# Patient Record
Sex: MALE | Race: WHITE | Employment: FULL TIME | URBAN - NONMETROPOLITAN AREA
[De-identification: names, ages, dates, MRNs, and addresses within clinical notes are randomized per-mention and may not be internally consistent; named-entity substitution may affect disease eponyms.]

---

## 2018-02-05 ENCOUNTER — TELEPHONE (OUTPATIENT)
Dept: FAMILY MEDICINE CLINIC | Age: 68
End: 2018-02-05

## 2018-02-05 DIAGNOSIS — Z00.00 ROUTINE GENERAL MEDICAL EXAMINATION AT A HEALTH CARE FACILITY: Primary | ICD-10-CM

## 2018-03-12 ENCOUNTER — OFFICE VISIT (OUTPATIENT)
Dept: FAMILY MEDICINE CLINIC | Age: 68
End: 2018-03-12
Payer: COMMERCIAL

## 2018-03-12 VITALS
RESPIRATION RATE: 18 BRPM | SYSTOLIC BLOOD PRESSURE: 142 MMHG | OXYGEN SATURATION: 98 % | WEIGHT: 200 LBS | TEMPERATURE: 98.7 F | DIASTOLIC BLOOD PRESSURE: 84 MMHG | HEART RATE: 78 BPM

## 2018-03-12 DIAGNOSIS — Z23 NEED FOR PROPHYLACTIC VACCINATION AGAINST STREPTOCOCCUS PNEUMONIAE (PNEUMOCOCCUS): ICD-10-CM

## 2018-03-12 DIAGNOSIS — Z00.00 ANNUAL PHYSICAL EXAM: Primary | ICD-10-CM

## 2018-03-12 DIAGNOSIS — R53.83 OTHER FATIGUE: ICD-10-CM

## 2018-03-12 DIAGNOSIS — Z28.21 PNEUMOCOCCAL VACCINATION DECLINED: ICD-10-CM

## 2018-03-12 DIAGNOSIS — E78.01 FAMILIAL HYPERCHOLESTEROLEMIA: ICD-10-CM

## 2018-03-12 PROCEDURE — 99397 PER PM REEVAL EST PAT 65+ YR: CPT | Performed by: FAMILY MEDICINE

## 2018-03-12 RX ORDER — CETIRIZINE HYDROCHLORIDE 10 MG/1
10 TABLET ORAL
COMMUNITY

## 2018-03-12 RX ORDER — TAMSULOSIN HYDROCHLORIDE 0.4 MG/1
1 CAPSULE ORAL DAILY
COMMUNITY
Start: 2016-05-02 | End: 2022-03-29 | Stop reason: SDUPTHER

## 2018-03-12 NOTE — PROGRESS NOTES
[facility]  [address] [place] [address]  Dept: [phone]  Dept Fax: [phone]  Loc: [phone]    Eladio Wells is a 74 y.o. male who presents today for his medical conditions/complaints as noted below. Eladio Wells is here for Annual Exam        HPI:   CC: Here today to discuss the following:    Here for yearly physical.    Since his last visit he had a nasopharyngeal mass removed in December was metastatic renal cell carcinoma. He continues to be followed at [facility]    Today he has no major concerns or complaints voiced. HPI    Past Medical History:   Diagnosis Date    Metastatic renal cell carcinoma ([facility] [address]     Nasopharyngeal mass     Metastatic renal cell carcinoma, clear cell      Past Surgical History:   Procedure Laterality Date    ADRENALECTOMY Left 10/2017    NOSE SURGERY  12/2017    Renal cell carcinoma, metastatic clear cell, April 2016, December 2017    PARTIAL NEPHRECTOMY Left 10/2017    t3a n0 m0             Family History   Problem Relation Age of Onset    Breast Cancer Mother     Breast Cancer Sister     Cancer Brother            Social History   Substance Use Topics    Smoking status: Never Smoker    Smokeless tobacco: Never Used    Alcohol use No      Current Outpatient Prescriptions   Medication Sig Dispense Refill    tamsulosin (FLOMAX) 0.4 MG capsule Take 1 capsule by mouth      cetirizine (ZYRTEC) 10 MG tablet Take 10 mg by mouth       No current facility-administered medications for this visit.       Allergies not on file    Health Maintenance   Topic Date Due    Hepatitis C screen  1950    HIV screen  05/25/1967    Shingles Vaccine (1 of 2 - 2 Dose Series) 05/25/2002    Colon cancer screen colonoscopy  05/25/2002    DTaP/Tdap/Td vaccine (1 - Tdap) 01/02/2015    Pneumococcal low/med risk (1 of 2 - PCV13) 05/25/2017    Flu vaccine (1) 09/01/2018 (Originally 9/1/2017)   Jenny Ojeda nodule near the left adrenal gland over prior exams starting in 7/2016, measuring up to 1.8 cm. This is concerning for metastatic disease. 2. Postsurgical changes to the left kidney, decreased compared to 3/7/2017. [facility] M.D. [facility]    Medical Record Number:   [id number]    Patient Name:   Lorena Morales    Date of Operation:   12/11/2017    Primary Surgeon for Case:   Preston Yanes MD    All Surgeons in Case:   * Preston Yanes MD - Primary  * Thomas Diaz MD -     Pre-op Diagnosis:  * Mass of nasopharynx [R22.1]    Post-op Diagnosis:  * Mass of nasopharynx [R22.1]    Anesthesia:   General    Title of Operation:  NASAL/SINUS ENDOSCOPY, SURGICAL; WITH BIOPSY, POLYPECTOMY OR DEBRIDEMENT    Indications For Procedure:   Mr. Dagoberto Coleman has metastatic clear cell carcinoma with a prior metastatic lesion in the nasopharynx that was excisionally biopsy via endoscopic approach approximately 18 months ago. The mass has not returned and is causing symptoms of nasal obstruction The risk of temporary or anosmia, CSF leak, the need for adjuvant therapy, blindness and orbital injury, carotid artery injury, eustachian tube dysfunction, bleeding, and need for additional surgery were discussed. The patient understands and agrees to proceed. Findings At Operation:   Pedunculated lesion attached to roof of left nasopharynx, posterior and superior to eustachian tube opening. Removed with stalk and surrounding portion of mucosa. Note: if additional resection needed in future, additional access may be needed - either septoplasty for spurs, inferior turbinate reduction, or posterior septectomy. Assessment & Plan: The following diagnoses and conditions are stable with no further orders unless indicated:  1. Need for prophylactic vaccination against Streptococcus pneumoniae (pneumococcus)  Declined    2. Familial hypercholesterolemia    - Comprehensive Metabolic Panel;  Future  -

## 2019-03-14 ENCOUNTER — OFFICE VISIT (OUTPATIENT)
Dept: FAMILY MEDICINE CLINIC | Age: 69
End: 2019-03-14
Payer: COMMERCIAL

## 2019-03-14 VITALS
OXYGEN SATURATION: 99 % | TEMPERATURE: 99 F | DIASTOLIC BLOOD PRESSURE: 96 MMHG | HEIGHT: 73 IN | BODY MASS INDEX: 27.7 KG/M2 | WEIGHT: 209 LBS | HEART RATE: 104 BPM | SYSTOLIC BLOOD PRESSURE: 136 MMHG

## 2019-03-14 DIAGNOSIS — E78.00 HYPERCHOLESTEROLEMIA: ICD-10-CM

## 2019-03-14 DIAGNOSIS — Z23 NEED FOR PNEUMOCOCCAL VACCINATION: ICD-10-CM

## 2019-03-14 DIAGNOSIS — Z00.00 ANNUAL PHYSICAL EXAM: Primary | ICD-10-CM

## 2019-03-14 PROCEDURE — 90670 PCV13 VACCINE IM: CPT | Performed by: FAMILY MEDICINE

## 2019-03-14 PROCEDURE — 90471 IMMUNIZATION ADMIN: CPT | Performed by: FAMILY MEDICINE

## 2019-03-14 PROCEDURE — 99397 PER PM REEVAL EST PAT 65+ YR: CPT | Performed by: FAMILY MEDICINE

## 2019-03-14 ASSESSMENT — PATIENT HEALTH QUESTIONNAIRE - PHQ9
2. FEELING DOWN, DEPRESSED OR HOPELESS: 0
SUM OF ALL RESPONSES TO PHQ QUESTIONS 1-9: 0
SUM OF ALL RESPONSES TO PHQ QUESTIONS 1-9: 0
1. LITTLE INTEREST OR PLEASURE IN DOING THINGS: 0
SUM OF ALL RESPONSES TO PHQ9 QUESTIONS 1 & 2: 0

## 2019-03-22 ENCOUNTER — NURSE ONLY (OUTPATIENT)
Dept: FAMILY MEDICINE CLINIC | Age: 69
End: 2019-03-22

## 2019-03-22 DIAGNOSIS — Z23 NEED FOR SHINGLES VACCINE: Primary | ICD-10-CM

## 2019-04-08 ENCOUNTER — OFFICE VISIT (OUTPATIENT)
Dept: FAMILY MEDICINE CLINIC | Age: 69
End: 2019-04-08
Payer: COMMERCIAL

## 2019-04-08 VITALS
SYSTOLIC BLOOD PRESSURE: 150 MMHG | BODY MASS INDEX: 27.43 KG/M2 | HEART RATE: 110 BPM | WEIGHT: 207 LBS | TEMPERATURE: 98.2 F | HEIGHT: 73 IN | OXYGEN SATURATION: 96 % | DIASTOLIC BLOOD PRESSURE: 82 MMHG

## 2019-04-08 DIAGNOSIS — J20.9 ACUTE BRONCHITIS, UNSPECIFIED ORGANISM: Primary | ICD-10-CM

## 2019-04-08 DIAGNOSIS — R03.0 ELEVATED BLOOD-PRESSURE READING WITHOUT DIAGNOSIS OF HYPERTENSION: ICD-10-CM

## 2019-04-08 PROCEDURE — 99213 OFFICE O/P EST LOW 20 MIN: CPT | Performed by: NURSE PRACTITIONER

## 2019-04-08 RX ORDER — AZITHROMYCIN 250 MG/1
TABLET, FILM COATED ORAL
Qty: 6 TABLET | Refills: 0 | Status: SHIPPED | OUTPATIENT
Start: 2019-04-08 | End: 2019-11-03 | Stop reason: ALTCHOICE

## 2019-04-08 ASSESSMENT — ENCOUNTER SYMPTOMS
ABDOMINAL PAIN: 0
SORE THROAT: 0
SHORTNESS OF BREATH: 0
NAUSEA: 0
COUGH: 1
DIARRHEA: 0
WHEEZING: 0
CHEST TIGHTNESS: 0

## 2019-04-08 NOTE — PROGRESS NOTES
Michelle Saucedo is a 79 y.o. male who presents today for  Chief Complaint   Patient presents with    Congestion       HPI:  He has had chest congestion and cough for the past week. Cough has been minimal but he has congestion in his chest and has been unable to expectorate much sputum despite taking Mucinex 1200 mg. He has had no fever or chills. No significant nasal symptoms but he does have chronic allergies. He takes Zyrtec daily. He has not taken any other over-the-counter medications for current symptoms. He has had some soreness in his chest from cough but no chest pain per se. He also mentions that he had some right upper quadrant pain after eating greasy food last week, lasted briefly. He had some mild loose stools with this. He apparently has a history of gallstones and has had imaging in the past per [facility] who follows his history of renal cell carcinoma. Recent renal function has been normal. He does have a partial left nephrectomy. Blood pressure slightly elevated today. He does not take blood pressure medicine. Typically controlled. Review of Systems   Constitutional: Negative for chills and fever. HENT: Positive for congestion. Negative for ear pain and sore throat. Respiratory: Positive for cough. Negative for chest tightness, shortness of breath and wheezing. Cardiovascular: Negative for chest pain. Gastrointestinal: Negative for abdominal pain, diarrhea and nausea. Musculoskeletal: Negative for arthralgias and myalgias. Skin: Negative for rash.        Past Medical History:   Diagnosis Date    Metastatic renal cell carcinoma (HCC)     Nasopharyngeal mass     Metastatic renal cell carcinoma, clear cell       Current Outpatient Medications   Medication Sig Dispense Refill    azithromycin (ZITHROMAX) 250 MG tablet Take 2 tablets by mouth on day 1 followed by 1 tablet daily on days 2 - 5 6 tablet 0    DiphenhydrAMINE HCl (BENADRYL ALLERGY PO) Take by mouth nightly  cetirizine (ZYRTEC) 10 MG tablet Take 10 mg by mouth      tamsulosin (FLOMAX) 0.4 MG capsule Take 1 capsule by mouth       No current facility-administered medications for this visit. No Known Allergies       Past Surgical History:   Procedure Laterality Date    ADRENALECTOMY Left 10/2017    NOSE SURGERY  12/2017    Renal cell carcinoma, metastatic clear cell, April 2016, December 2017    PARTIAL NEPHRECTOMY Left 10/2017    t3a n0 m0       Social History     Tobacco Use    Smoking status: Never Smoker    Smokeless tobacco: Never Used   Substance Use Topics    Alcohol use: No    Drug use: No       Family History   Problem Relation Age of Onset    Breast Cancer Mother     Breast Cancer Sister     Cancer Brother        BP (!) 150/82   Pulse 110   Temp 98.2 °F (36.8 °C) (Temporal)   Ht 6' 1\" (1.854 m)   Wt 207 lb (93.9 kg)   SpO2 96%   BMI 27.31 kg/m²     Physical Exam   Constitutional: He appears well-developed and well-nourished. HENT:   Head: Normocephalic. Right Ear: External ear normal.   Left Ear: External ear normal.   Nose: Nose normal.   Mouth/Throat: Oropharynx is clear and moist. No oropharyngeal exudate. Eyes: Pupils are equal, round, and reactive to light. Conjunctivae and EOM are normal.   Neck: Normal range of motion. Neck supple. No JVD present. No tracheal deviation present. No thyromegaly present. Cardiovascular: Normal rate, regular rhythm, normal heart sounds and intact distal pulses. No murmur heard. Pulmonary/Chest: Effort normal and breath sounds normal. No respiratory distress. Slightly diminished bilaterally but no significant findings. No wheeze or rales. Respirations are even and unlabored. Musculoskeletal: Normal range of motion. He exhibits no edema. Lymphadenopathy:     He has no cervical adenopathy. Skin: Skin is warm and dry. No rash noted. Psychiatric: He has a normal mood and affect. Vitals reviewed. Assessment:    ICD-10-CM    1. Acute bronchitis, unspecified organism J20.9    2. Elevated blood-pressure reading without diagnosis of hypertension R03.0        Plan:  -Treat with Z-Anthony for early acute bronchitis.  -He will try Mucinex again but decreased to 600 mg to see if he tolerates better. He felt that the 1200 mg dose was too strong.  -He declined cough medication. Also declined steroid injection.  -He will report any acute worsening including fever, chest pain, increased cough, shortness of breath, any other worsening. If so he will need a chest x-ray. -Monitor blood pressure which was minimally elevated today. No history of this. Likely due to his acute illness.  -He will report any continued right upper quadrant pain in setting of chronic gallstones per his report. Advised to avoid greasy fried foods.  -Follow-up as scheduled, sooner with problems    Kingsley Da Silva was seen today for congestion. Diagnoses and all orders for this visit:    Acute bronchitis, unspecified organism    Elevated blood-pressure reading without diagnosis of hypertension    Other orders  -     azithromycin (ZITHROMAX) 250 MG tablet; Take 2 tablets by mouth on day 1 followed by 1 tablet daily on days 2 - 5      There are no discontinued medications. There are no Patient Instructions on file for this visit. Patient voicesunderstanding and agrees to plans along with risks and benefits of plan. Counseling:  Simba Cecyjuana Daniel's case, medications and options were discussed in detail. Patient was instructed to call the office if he questionsregarding him treatment. Should him conditions worsen, he should return to office to be reassessed by RAY Malik. he Should to go the closest Emergency Department for any emergency. They verbalizedunderstanding the above instructions. No follow-ups on file.

## 2019-07-10 ENCOUNTER — NURSE ONLY (OUTPATIENT)
Dept: FAMILY MEDICINE CLINIC | Age: 69
End: 2019-07-10
Payer: COMMERCIAL

## 2019-07-10 DIAGNOSIS — Z23 NEED FOR SHINGLES VACCINE: Primary | ICD-10-CM

## 2019-07-10 PROCEDURE — 90750 HZV VACC RECOMBINANT IM: CPT | Performed by: FAMILY MEDICINE

## 2019-07-10 PROCEDURE — 90471 IMMUNIZATION ADMIN: CPT | Performed by: FAMILY MEDICINE

## 2019-09-12 ENCOUNTER — NURSE ONLY (OUTPATIENT)
Dept: FAMILY MEDICINE CLINIC | Age: 69
End: 2019-09-12
Payer: COMMERCIAL

## 2019-09-12 DIAGNOSIS — Z23 FLU VACCINE NEED: Primary | ICD-10-CM

## 2019-09-12 PROCEDURE — 90471 IMMUNIZATION ADMIN: CPT | Performed by: FAMILY MEDICINE

## 2019-09-12 PROCEDURE — 90653 IIV ADJUVANT VACCINE IM: CPT | Performed by: FAMILY MEDICINE

## 2019-09-12 NOTE — PROGRESS NOTES
Vaccine Information Sheet, \"Influenza - Inactivated\" OR \"Live - Intranasal\"  given to Shawn Fay, or parent/legal guardian of  Shawn Fay and verbalized understanding. Patient responses:    Have you ever had a reaction to a flu vaccine? No  Are you able to eat eggs without adverse effects? Yes  Do you have any current illness? No  Have you ever had Guillian [place] Syndrome? No    Flu vaccine given per order. Please see immunization tab.

## 2019-11-03 ENCOUNTER — APPOINTMENT (OUTPATIENT)
Dept: GENERAL RADIOLOGY | Age: 69
End: 2019-11-03
Payer: COMMERCIAL

## 2019-11-03 ENCOUNTER — HOSPITAL ENCOUNTER (EMERGENCY)
Age: 69
Discharge: HOME | End: 2019-11-03
Attending: EMERGENCY MEDICINE
Payer: COMMERCIAL

## 2019-11-03 VITALS
BODY MASS INDEX: 27.04 KG/M2 | TEMPERATURE: 97.8 F | RESPIRATION RATE: 18 BRPM | OXYGEN SATURATION: 96 % | DIASTOLIC BLOOD PRESSURE: 71 MMHG | HEART RATE: 78 BPM | WEIGHT: 204 LBS | SYSTOLIC BLOOD PRESSURE: 143 MMHG | HEIGHT: 73 IN

## 2019-11-03 DIAGNOSIS — R07.9 CHEST PAIN, UNSPECIFIED TYPE: Primary | ICD-10-CM

## 2019-11-03 LAB
ANION GAP SERPL CALCULATED.3IONS-SCNC: 12 MMOL/L (ref 7–19)
BUN BLDV-MCNC: 21 MG/DL (ref 8–23)
CALCIUM SERPL-MCNC: 8.9 MG/DL (ref 8.8–10.2)
CHLORIDE BLD-SCNC: 104 MMOL/L (ref 98–111)
CO2: 26 MMOL/L (ref 22–29)
CREAT SERPL-MCNC: 0.9 MG/DL (ref 0.5–1.2)
GFR NON-AFRICAN AMERICAN: >60
GLUCOSE BLD-MCNC: 155 MG/DL (ref 74–109)
HCT VFR BLD CALC: 45.9 % (ref 42–52)
HEMOGLOBIN: 15.2 G/DL (ref 14–18)
MCH RBC QN AUTO: 31 PG (ref 27–31)
MCHC RBC AUTO-ENTMCNC: 33.1 G/DL (ref 33–37)
MCV RBC AUTO: 93.7 FL (ref 80–94)
PDW BLD-RTO: 14.4 % (ref 11.5–14.5)
PLATELET # BLD: 203 K/UL (ref 130–400)
PMV BLD AUTO: 10 FL (ref 9.4–12.4)
POTASSIUM REFLEX MAGNESIUM: 4 MMOL/L (ref 3.5–5)
RBC # BLD: 4.9 M/UL (ref 4.7–6.1)
SODIUM BLD-SCNC: 142 MMOL/L (ref 136–145)
TROPONIN: <0.01 NG/ML (ref 0–0.03)
TROPONIN: <0.01 NG/ML (ref 0–0.03)
WBC # BLD: 8.8 K/UL (ref 4.8–10.8)

## 2019-11-03 PROCEDURE — 99999 PR OFFICE/OUTPT VISIT,PROCEDURE ONLY: CPT | Performed by: EMERGENCY MEDICINE

## 2019-11-03 PROCEDURE — 80048 BASIC METABOLIC PNL TOTAL CA: CPT

## 2019-11-03 PROCEDURE — 6370000000 HC RX 637 (ALT 250 FOR IP): Performed by: EMERGENCY MEDICINE

## 2019-11-03 PROCEDURE — 36415 COLL VENOUS BLD VENIPUNCTURE: CPT

## 2019-11-03 PROCEDURE — 93005 ELECTROCARDIOGRAM TRACING: CPT | Performed by: EMERGENCY MEDICINE

## 2019-11-03 PROCEDURE — 71046 X-RAY EXAM CHEST 2 VIEWS: CPT

## 2019-11-03 PROCEDURE — 84484 ASSAY OF TROPONIN QUANT: CPT

## 2019-11-03 PROCEDURE — 99285 EMERGENCY DEPT VISIT HI MDM: CPT

## 2019-11-03 PROCEDURE — 85027 COMPLETE CBC AUTOMATED: CPT

## 2019-11-03 RX ORDER — ASPIRIN 81 MG/1
81 TABLET ORAL DAILY
Qty: 30 TABLET | Refills: 0 | Status: SHIPPED | OUTPATIENT
Start: 2019-11-03 | End: 2019-11-14 | Stop reason: SINTOL

## 2019-11-03 RX ORDER — ASPIRIN 81 MG/1
324 TABLET, CHEWABLE ORAL ONCE
Status: COMPLETED | OUTPATIENT
Start: 2019-11-03 | End: 2019-11-03

## 2019-11-03 RX ORDER — OMEPRAZOLE 20 MG/1
20 CAPSULE, DELAYED RELEASE ORAL DAILY
COMMUNITY
End: 2019-11-14 | Stop reason: ALTCHOICE

## 2019-11-03 RX ADMIN — ASPIRIN 81 MG 324 MG: 81 TABLET ORAL at 01:06

## 2019-11-03 ASSESSMENT — ENCOUNTER SYMPTOMS
DIARRHEA: 0
EYE PAIN: 0
VOMITING: 0
VOICE CHANGE: 0
SHORTNESS OF BREATH: 0
EYE REDNESS: 0
COUGH: 0
RHINORRHEA: 0
ABDOMINAL PAIN: 0

## 2019-11-04 ENCOUNTER — OFFICE VISIT (OUTPATIENT)
Dept: FAMILY MEDICINE CLINIC | Age: 69
End: 2019-11-04
Payer: COMMERCIAL

## 2019-11-04 VITALS
DIASTOLIC BLOOD PRESSURE: 84 MMHG | BODY MASS INDEX: 27.71 KG/M2 | SYSTOLIC BLOOD PRESSURE: 142 MMHG | WEIGHT: 210 LBS | TEMPERATURE: 98.2 F | OXYGEN SATURATION: 98 % | HEART RATE: 119 BPM

## 2019-11-04 DIAGNOSIS — K21.9 GASTROESOPHAGEAL REFLUX DISEASE WITHOUT ESOPHAGITIS: ICD-10-CM

## 2019-11-04 DIAGNOSIS — R07.9 CHEST PAIN, UNSPECIFIED TYPE: Primary | ICD-10-CM

## 2019-11-04 LAB
EKG P AXIS: 59 DEGREES
EKG P-R INTERVAL: 182 MS
EKG Q-T INTERVAL: 338 MS
EKG QRS DURATION: 84 MS
EKG QTC CALCULATION (BAZETT): 408 MS
EKG T AXIS: 60 DEGREES

## 2019-11-04 PROCEDURE — 99213 OFFICE O/P EST LOW 20 MIN: CPT | Performed by: FAMILY MEDICINE

## 2019-11-04 PROCEDURE — 93010 ELECTROCARDIOGRAM REPORT: CPT | Performed by: INTERNAL MEDICINE

## 2019-11-04 RX ORDER — ESOMEPRAZOLE MAGNESIUM 40 MG/1
40 CAPSULE, DELAYED RELEASE ORAL
Qty: 30 CAPSULE | Refills: 5 | Status: SHIPPED | OUTPATIENT
Start: 2019-11-04 | End: 2019-11-14 | Stop reason: SINTOL

## 2019-11-07 ASSESSMENT — ENCOUNTER SYMPTOMS
DIARRHEA: 0
CHEST TIGHTNESS: 1
NAUSEA: 0
ABDOMINAL PAIN: 0
COUGH: 0
CONSTIPATION: 0
SHORTNESS OF BREATH: 0
ANAL BLEEDING: 0

## 2019-11-13 ENCOUNTER — TELEPHONE (OUTPATIENT)
Dept: FAMILY MEDICINE CLINIC | Age: 69
End: 2019-11-13

## 2019-11-14 ENCOUNTER — HOSPITAL ENCOUNTER (EMERGENCY)
Age: 69
Discharge: HOME | End: 2019-11-14
Attending: EMERGENCY MEDICINE
Payer: COMMERCIAL

## 2019-11-14 VITALS
OXYGEN SATURATION: 95 % | WEIGHT: 204 LBS | HEIGHT: 73 IN | BODY MASS INDEX: 27.04 KG/M2 | TEMPERATURE: 98 F | DIASTOLIC BLOOD PRESSURE: 77 MMHG | RESPIRATION RATE: 20 BRPM | HEART RATE: 92 BPM | SYSTOLIC BLOOD PRESSURE: 124 MMHG

## 2019-11-14 DIAGNOSIS — T78.40XA ALLERGIC REACTION, INITIAL ENCOUNTER: Primary | ICD-10-CM

## 2019-11-14 PROCEDURE — 96375 TX/PRO/DX INJ NEW DRUG ADDON: CPT

## 2019-11-14 PROCEDURE — 99282 EMERGENCY DEPT VISIT SF MDM: CPT

## 2019-11-14 PROCEDURE — 6360000002 HC RX W HCPCS: Performed by: EMERGENCY MEDICINE

## 2019-11-14 PROCEDURE — 96374 THER/PROPH/DIAG INJ IV PUSH: CPT

## 2019-11-14 PROCEDURE — 2500000003 HC RX 250 WO HCPCS: Performed by: EMERGENCY MEDICINE

## 2019-11-14 RX ORDER — PREDNISONE 20 MG/1
40 TABLET ORAL DAILY
Qty: 10 TABLET | Refills: 0 | Status: SHIPPED | OUTPATIENT
Start: 2019-11-14 | End: 2019-11-19

## 2019-11-14 RX ORDER — FAMOTIDINE 20 MG/1
20 TABLET, FILM COATED ORAL 2 TIMES DAILY
Qty: 20 TABLET | Refills: 0 | Status: SHIPPED | OUTPATIENT
Start: 2019-11-14 | End: 2020-02-06 | Stop reason: SDUPTHER

## 2019-11-14 RX ORDER — METHYLPREDNISOLONE SODIUM SUCCINATE 125 MG/2ML
125 INJECTION, POWDER, LYOPHILIZED, FOR SOLUTION INTRAMUSCULAR; INTRAVENOUS ONCE
Status: COMPLETED | OUTPATIENT
Start: 2019-11-14 | End: 2019-11-14

## 2019-11-14 RX ORDER — DIPHENHYDRAMINE HYDROCHLORIDE 50 MG/ML
50 INJECTION INTRAMUSCULAR; INTRAVENOUS ONCE
Status: COMPLETED | OUTPATIENT
Start: 2019-11-14 | End: 2019-11-14

## 2019-11-14 RX ADMIN — METHYLPREDNISOLONE SODIUM SUCCINATE 125 MG: 125 INJECTION, POWDER, FOR SOLUTION INTRAMUSCULAR; INTRAVENOUS at 06:26

## 2019-11-14 RX ADMIN — FAMOTIDINE 20 MG: 10 INJECTION, SOLUTION INTRAVENOUS at 06:26

## 2019-11-14 RX ADMIN — DIPHENHYDRAMINE HYDROCHLORIDE 50 MG: 50 INJECTION, SOLUTION INTRAMUSCULAR; INTRAVENOUS at 06:26

## 2019-11-14 ASSESSMENT — ENCOUNTER SYMPTOMS
ABDOMINAL PAIN: 0
EYE PAIN: 0
VOMITING: 0
DIARRHEA: 0
SHORTNESS OF BREATH: 0

## 2019-11-21 ENCOUNTER — HOSPITAL ENCOUNTER (OUTPATIENT)
Dept: NON INVASIVE DIAGNOSTICS | Age: 69
Discharge: HOME | End: 2019-11-21
Payer: COMMERCIAL

## 2019-11-21 VITALS
WEIGHT: 202 LBS | DIASTOLIC BLOOD PRESSURE: 65 MMHG | HEART RATE: 94 BPM | SYSTOLIC BLOOD PRESSURE: 157 MMHG | BODY MASS INDEX: 26.65 KG/M2

## 2019-11-21 DIAGNOSIS — R07.9 CHEST PAIN, UNSPECIFIED TYPE: ICD-10-CM

## 2019-11-21 PROCEDURE — 2580000003 HC RX 258: Performed by: INTERNAL MEDICINE

## 2019-11-21 PROCEDURE — 93350 STRESS TTE ONLY: CPT

## 2019-11-21 PROCEDURE — 6360000002 HC RX W HCPCS: Performed by: INTERNAL MEDICINE

## 2019-11-21 RX ORDER — SODIUM CHLORIDE 9 MG/ML
INJECTION, SOLUTION INTRAVENOUS
Status: COMPLETED | OUTPATIENT
Start: 2019-11-21 | End: 2019-11-21

## 2019-11-21 RX ORDER — DOBUTAMINE HYDROCHLORIDE 200 MG/100ML
10 INJECTION INTRAVENOUS CONTINUOUS PRN
Status: DISCONTINUED | OUTPATIENT
Start: 2019-11-21 | End: 2019-11-22 | Stop reason: HOSPADM

## 2019-11-21 RX ADMIN — SODIUM CHLORIDE: 9 INJECTION, SOLUTION INTRAVENOUS at 12:15

## 2019-11-21 RX ADMIN — DOBUTAMINE HYDROCHLORIDE 10 MCG/KG/MIN: 200 INJECTION INTRAVENOUS at 12:15

## 2019-12-20 ENCOUNTER — OFFICE VISIT (OUTPATIENT)
Dept: FAMILY MEDICINE CLINIC | Age: 69
End: 2019-12-20
Payer: COMMERCIAL

## 2019-12-20 VITALS
HEART RATE: 78 BPM | BODY MASS INDEX: 28.1 KG/M2 | WEIGHT: 212 LBS | HEIGHT: 73 IN | TEMPERATURE: 97 F | DIASTOLIC BLOOD PRESSURE: 78 MMHG | SYSTOLIC BLOOD PRESSURE: 116 MMHG | RESPIRATION RATE: 18 BRPM | OXYGEN SATURATION: 98 %

## 2019-12-20 DIAGNOSIS — J02.9 ACUTE PHARYNGITIS, UNSPECIFIED ETIOLOGY: Primary | ICD-10-CM

## 2019-12-20 DIAGNOSIS — B96.89 BACTERIAL URI: ICD-10-CM

## 2019-12-20 DIAGNOSIS — J06.9 BACTERIAL URI: ICD-10-CM

## 2019-12-20 PROCEDURE — 99213 OFFICE O/P EST LOW 20 MIN: CPT | Performed by: NURSE PRACTITIONER

## 2019-12-20 RX ORDER — AZITHROMYCIN 250 MG/1
TABLET, FILM COATED ORAL
Qty: 6 TABLET | Refills: 0 | Status: SHIPPED | OUTPATIENT
Start: 2019-12-20 | End: 2020-03-16 | Stop reason: ALTCHOICE

## 2019-12-20 ASSESSMENT — ENCOUNTER SYMPTOMS
ABDOMINAL PAIN: 0
VOMITING: 0
CHEST TIGHTNESS: 0
COUGH: 0
WHEEZING: 0
RHINORRHEA: 1
SORE THROAT: 1
NAUSEA: 0
DIARRHEA: 0
SHORTNESS OF BREATH: 0

## 2020-02-06 ENCOUNTER — OFFICE VISIT (OUTPATIENT)
Dept: FAMILY MEDICINE CLINIC | Age: 70
End: 2020-02-06
Payer: COMMERCIAL

## 2020-02-06 VITALS
HEIGHT: 73 IN | SYSTOLIC BLOOD PRESSURE: 144 MMHG | DIASTOLIC BLOOD PRESSURE: 82 MMHG | HEART RATE: 88 BPM | TEMPERATURE: 97.1 F | WEIGHT: 221 LBS | OXYGEN SATURATION: 99 % | BODY MASS INDEX: 29.29 KG/M2

## 2020-02-06 PROCEDURE — 99213 OFFICE O/P EST LOW 20 MIN: CPT | Performed by: FAMILY MEDICINE

## 2020-02-06 RX ORDER — FAMOTIDINE 20 MG/1
20 TABLET, FILM COATED ORAL DAILY
Qty: 30 TABLET | Refills: 5 | Status: SHIPPED | OUTPATIENT
Start: 2020-02-06 | End: 2021-05-10 | Stop reason: SDUPTHER

## 2020-02-06 ASSESSMENT — PATIENT HEALTH QUESTIONNAIRE - PHQ9
2. FEELING DOWN, DEPRESSED OR HOPELESS: 0
1. LITTLE INTEREST OR PLEASURE IN DOING THINGS: 0
SUM OF ALL RESPONSES TO PHQ9 QUESTIONS 1 & 2: 0
SUM OF ALL RESPONSES TO PHQ QUESTIONS 1-9: 0
SUM OF ALL RESPONSES TO PHQ QUESTIONS 1-9: 0

## 2020-02-06 NOTE — PATIENT INSTRUCTIONS
We are committed to providing you with the best care possible. In order to help us achieve these goals please remember to bring all medications, herbal products, and over the counter supplements with you to each visit. If your provider has ordered testing for you, please be sure to follow up with our office if you have not received results within 7 days after the testing took place. *If you receive a survey after visiting one of our offices, please take time to share your experience concerning your physician office visit. These surveys are confidential and no health information about you is shared. We are eager to improve for you and we are counting on your feedback to help make that happen.    _______________________________________________________________    Considering testosterone therapy to help you feel younger and more vigorous as you age? Know the risks before you make your decision. By [facility] Staff    The promise of testosterone therapy may seem enticing, but there are a lot of misconceptions about what the treatment can and can't do for you. As you get older, testosterone therapy may sound like the ultimate anti-aging formula. Yet the health benefits of testosterone therapy for age-related decline in testosterone aren't as clear as they may seem. Find out what's known -- and not known -- about testosterone therapy for normal aging. Testosterone is a hormone produced primarily in the testicles. Testosterone helps maintain men's:    Bone density  Fat distribution  Muscle strength and mass  Facial and body hair  Red blood cell production  Sex drive  Sperm production    Testosterone levels generally peak during adolescence and early adulthood. As you get older, your testosterone level gradually declines -- typically about 1 percent a year after age 27 or 36.  It is important to determine in older men if a low testosterone level is simply due to the decline of normal aging or if it is due to a disease (hypogonadism). Hypogonadism is a disease in which the body is unable to produce normal amounts of testosterone due to a problem with the testicles or with the pituitary gland that controls the testicles. Testosterone replacement therapy can improve the signs and symptoms of low testosterone in these men. Doctors may prescribe testosterone as injections, pellets, patches or gels. Not necessarily. Men can experience many signs and symptoms as they age. Some may occur as a result of lower testosterone levels and can include:    Changes in sexual function. This may include reduced sexual desire, fewer spontaneous erections -- such as during sleep -- and infertility. Changes in sleep patterns. Sometimes low testosterone causes insomnia or other sleep disturbances. Physical changes. Various physical changes are possible, including increased body fat, reduced muscle bulk and strength, and decreased bone density. Swollen or tender breasts (gynecomastia) and body hair loss are possible. You may have less energy than you used to. Emotional changes. Low testosterone may contribute to a decrease in motivation or self-confidence. You may feel sad or depressed, or have trouble concentrating or remembering things. Some of these signs and symptoms can be caused by various underlying factors, including medication side effects, obstructive sleep apnea, thyroid problems, diabetes and depression. It's also possible that these conditions may be the cause of low testosterone levels, and treatment of these problems may cause testosterone levels to rise. A blood test is the only way to diagnose a low testosterone level. Testosterone therapy can help reverse the effects of hypogonadism, but it's unclear whether testosterone therapy would have any benefit for older men who are otherwise healthy.     Although some men believe that taking testosterone medications may help them feel younger and more vigorous as they age, few rigorous studies have examined testosterone therapy in men who have healthy testosterone levels. And some small studies have revealed mixed results. For example, in one study healthy men who took testosterone medications increased muscle mass but didn't gain strength. Testosterone therapy has various risks. For example, testosterone therapy may:    · Contribute to sleep apnea -- a potentially serious sleep disorder in which breathing repeatedly stops and starts  · Cause acne or other skin reactions  · Stimulate noncancerous growth of the prostate (benign prostatic hyperplasia) and growth of existing prostate cancer  · Enlarge breasts  · Limit sperm production or cause testicle shrinkage  · Increase the risk of a blood clot forming in a deep vein (deep vein thrombosis), which could break loose, travel through your bloodstream and lodge in your lungs, blocking blood flow (pulmonary embolism)  · In addition, testosterone therapy may impact your risk of heart disease. Research has had conflicting results, so the exact risk isn't clear yet. If you wonder whether testosterone therapy might be right for you, talk with your doctor about the risks and benefits. Your doctor will likely measure your testosterone levels at least twice before discussing whether testosterone therapy is an option for you. A medical condition that leads to an unusual decline in testosterone may be a reason to take supplemental testosterone. However, treating normal aging with testosterone therapy is not currently advisable. Your doctor will also likely suggest natural ways to boost testosterone, such as losing weight and increasing muscle mass through resistance exercise. References    Kahlil CARRASCO, et al. Testosterone therapy in men with androgen deficiency syndromes: An Endocrine Society clinical practice guideline. Journal of Clinical Endocrinology and Metabolism. 0237;14:6878. Amada GONZALEZ.  Testosterone replacement therapy faces FDA scrutiny. Journal of the [address] [place] [address]. 2014;E1. Patti MEADE, et al. Disorders of the testes and male reproductive system In: Terry's Principles of Internal Medicine. 18th ed. EMG Person.: The [employer]; 2012. http://Collibra. Accessed Feb. 4, 2015. Suzanne PJ. Overview of testosterone deficiency in older men. RetroDivas.ch. Accessed Feb. 2, 2015. AUA position statement on testosterone therapy. American Urological Association. FlexiMeal.tn. Accessed Feb. 4, 2015. Rosie BARNHART. Association of testosterone therapy with mortality, myocardial infarction, and stroke in men with low testosterone levels. Journal of the [address] [place]. 2013;310:1829. FDA adding general warning to testosterone products about potential for venous blood clots. U.S. Food and Drug Administration. https://www.HubHub.Healthify/. Accessed Feb. 5, 2015. Polly MCNAMARA (expert opinion). [place], [place]. Feb. 17, 2015. Cnade Fontenot (expert opinion). [place], [place]. Feb. 10, 2015.

## 2020-02-06 NOTE — PROGRESS NOTES
[facility] [address] [place] [address]  Dept: [phone]  Dept Fax: [phone]  Loc: [phone]    Junior Young is a 81 y.o. male who presents today for his medical conditions/complaints as noted below. Junior Young is here for 3 Month Follow-Up and Other (possible nexium allergy )        HPI:   CC: Here today to discuss the following:    He has a history of reflux but was having adverse effects to PPIs. Tried both Nexium and Prilosec and became ill with both. Pepcid appears to be controlling his symptoms. Allergies  Allergies are currently stable. HPI    Past Medical History:   Diagnosis Date    Metastatic renal cell carcinoma (HCC)     Nasopharyngeal mass     Metastatic renal cell carcinoma, clear cell      Past Surgical History:   Procedure Laterality Date    ADRENALECTOMY Left 10/2017    NOSE SURGERY  12/2017    Renal cell carcinoma, metastatic clear cell, April 2016, December 2017    PARTIAL NEPHRECTOMY Left 10/2017    t3a n0 m0       Family History   Problem Relation Age of Onset    Breast Cancer Mother     Breast Cancer Sister     Cancer Brother        Social History     Tobacco Use    Smoking status: Never Smoker    Smokeless tobacco: Never Used   Substance Use Topics    Alcohol use: No     Current Outpatient Medications   Medication Sig Dispense Refill    famotidine (PEPCID) 20 MG tablet Take 1 tablet by mouth daily 30 tablet 5    cetirizine (ZYRTEC) 10 MG tablet Take 10 mg by mouth      tamsulosin (FLOMAX) 0.4 MG capsule Take 1 capsule by mouth every other day       azithromycin (ZITHROMAX) 250 MG tablet Take 2 tablets by mouth on day 1 followed by 1 tablet daily on days 2 - 5 (Patient not taking: Reported on 2/6/2020) 6 tablet 0     No current facility-administered medications for this visit.       Allergies   Allergen Reactions    Nexium [Esomeprazole Magnesium] Rash    Prilosec

## 2020-03-16 ENCOUNTER — OFFICE VISIT (OUTPATIENT)
Dept: FAMILY MEDICINE CLINIC | Age: 70
End: 2020-03-16
Payer: COMMERCIAL

## 2020-03-16 VITALS
SYSTOLIC BLOOD PRESSURE: 122 MMHG | TEMPERATURE: 97.7 F | DIASTOLIC BLOOD PRESSURE: 84 MMHG | HEART RATE: 123 BPM | WEIGHT: 208 LBS | OXYGEN SATURATION: 97 % | BODY MASS INDEX: 27.57 KG/M2 | HEIGHT: 73 IN

## 2020-03-16 PROCEDURE — G0439 PPPS, SUBSEQ VISIT: HCPCS | Performed by: FAMILY MEDICINE

## 2020-03-16 PROCEDURE — G8482 FLU IMMUNIZE ORDER/ADMIN: HCPCS | Performed by: FAMILY MEDICINE

## 2020-03-16 PROCEDURE — 99213 OFFICE O/P EST LOW 20 MIN: CPT | Performed by: FAMILY MEDICINE

## 2020-03-16 PROCEDURE — 90732 PPSV23 VACC 2 YRS+ SUBQ/IM: CPT | Performed by: FAMILY MEDICINE

## 2020-03-16 PROCEDURE — 3017F COLORECTAL CA SCREEN DOC REV: CPT | Performed by: FAMILY MEDICINE

## 2020-03-16 PROCEDURE — 1036F TOBACCO NON-USER: CPT | Performed by: FAMILY MEDICINE

## 2020-03-16 PROCEDURE — G8427 DOCREV CUR MEDS BY ELIG CLIN: HCPCS | Performed by: FAMILY MEDICINE

## 2020-03-16 PROCEDURE — 4040F PNEUMOC VAC/ADMIN/RCVD: CPT | Performed by: FAMILY MEDICINE

## 2020-03-16 PROCEDURE — G8417 CALC BMI ABV UP PARAM F/U: HCPCS | Performed by: FAMILY MEDICINE

## 2020-03-16 PROCEDURE — 1123F ACP DISCUSS/DSCN MKR DOCD: CPT | Performed by: FAMILY MEDICINE

## 2020-03-16 PROCEDURE — 90471 IMMUNIZATION ADMIN: CPT | Performed by: FAMILY MEDICINE

## 2020-03-16 ASSESSMENT — LIFESTYLE VARIABLES
HOW OFTEN DURING THE LAST YEAR HAVE YOU BEEN UNABLE TO REMEMBER WHAT HAPPENED THE NIGHT BEFORE BECAUSE YOU HAD BEEN DRINKING: 0
HAVE YOU OR SOMEONE ELSE BEEN INJURED AS A RESULT OF YOUR DRINKING: 0
HOW OFTEN DURING THE LAST YEAR HAVE YOU FAILED TO DO WHAT WAS NORMALLY EXPECTED FROM YOU BECAUSE OF DRINKING: 0
AUDIT TOTAL SCORE: 1
HOW OFTEN DURING THE LAST YEAR HAVE YOU HAD A FEELING OF GUILT OR REMORSE AFTER DRINKING: 0
HAS A RELATIVE, FRIEND, DOCTOR, OR ANOTHER HEALTH PROFESSIONAL EXPRESSED CONCERN ABOUT YOUR DRINKING OR SUGGESTED YOU CUT DOWN: 0
HOW OFTEN DO YOU HAVE A DRINK CONTAINING ALCOHOL: 1
HOW OFTEN DURING THE LAST YEAR HAVE YOU FOUND THAT YOU WERE NOT ABLE TO STOP DRINKING ONCE YOU HAD STARTED: 0
AUDIT-C TOTAL SCORE: 1
HOW MANY STANDARD DRINKS CONTAINING ALCOHOL DO YOU HAVE ON A TYPICAL DAY: 0
HOW OFTEN DO YOU HAVE SIX OR MORE DRINKS ON ONE OCCASION: 0
HOW OFTEN DURING THE LAST YEAR HAVE YOU NEEDED AN ALCOHOLIC DRINK FIRST THING IN THE MORNING TO GET YOURSELF GOING AFTER A NIGHT OF HEAVY DRINKING: 0

## 2020-03-16 ASSESSMENT — PATIENT HEALTH QUESTIONNAIRE - PHQ9
SUM OF ALL RESPONSES TO PHQ QUESTIONS 1-9: 0
SUM OF ALL RESPONSES TO PHQ QUESTIONS 1-9: 0

## 2020-03-16 NOTE — PROGRESS NOTES
times per week?: Yes  Have you lost any weight without trying in the past 3 months?: No  Do you eat fewer than 2 meals per day?: No  Have you seen a dentist within the past year?: Yes  Body mass index is 27.44 kg/m². Health Habits/Nutrition Interventions:  · Not indicated     Hearing/Vision  Do you or your family notice any trouble with your hearing?: No  Do you have difficulty driving, watching TV, or doing any of your daily activities because of your eyesight?: No  Have you had an eye exam within the past year?: Yes  Hearing/Vision Interventions:  · Not indicated     Safety  Do you have working smoke detectors?: Yes  Have all throw rugs been removed or fastened?: Yes  Do you have non-slip mats or surfaces in all bathtubs/showers?: Yes  Do all of your stairways have a railing or banister?: Yes  Are your doorways, halls and stairs free of clutter?: Yes  Do you always fasten your seatbelt when you are in a car?: Yes  Safety Interventions:  · Not indicated     ADLs  In the past 7 days, did you need help from others to perform any of the following everyday activities? Eating, dressing, grooming, bathing, toileting, or walking/balance?: None  In the past 7 days, did you need help from others to take care of any of the following?  Laundry, housekeeping, banking/finances, shopping, telephone use, food preparation, transportation, or taking medications?: None  ADL Interventions:  · Not indicated     Personalized Preventive Plan   Current Health Maintenance Status  Immunization History   Administered Date(s) Administered    Influenza, Triv, inactivated, subunit, adjuvanted, IM (Fluad 65 yrs and older) 09/12/2019    Pneumococcal Conjugate 13-valent (Hulon Martinis) 03/14/2019    Tetanus 01/01/2015    Zoster Recombinant (Shingrix) 03/22/2019, 07/10/2019        Health Maintenance   Topic Date Due    Hepatitis C screen  1950    Diabetes screen  05/25/1992    Colon cancer screen colonoscopy  05/25/2002    Pneumococcal 65+ years Vaccine (2 of 2 - PPSV23) 03/14/2020    DTaP/Tdap/Td vaccine (1 - Tdap) 03/16/2021 (Originally 5/25/1971)    Lipid screen  03/05/2025    Flu vaccine  Completed    Shingles Vaccine  Completed    Hepatitis A vaccine  Aged Out    Hepatitis B vaccine  Aged Out    Hib vaccine  Aged Out    Meningococcal (ACWY) vaccine  Aged Out       Recommendations for "[employer] Due: see orders.   Recommended screening schedule for the next 5-10 years is provided to the patient in written form: see Patient Instructions/AVS.

## 2020-03-16 NOTE — PROGRESS NOTES
After obtaining consent, and per orders of Dr. Shana De Anda, injection of [id number] given in Right deltoid by Alanna Pierson. Patient instructed to remain in clinic for 20 minutes afterwards, and to report any adverse reaction to me immediately.

## 2020-03-16 NOTE — PROGRESS NOTES
[facility] [address] [place] [address]  Dept: [phone]  Dept Fax: [phone]  Loc: [phone]    Ashley Franco is a 81 y.o. male who presents today for his medical conditions/complaints as noted below. Ashley Franco is here for Medicare AWV        HPI:   CC: Here today to discuss the followin-year-old here for annual wellness visit. Gastroesophageal Reflux Disease  Symptoms currently under control. Medication adequately controls his symptoms. No hematochezia or melena. Allergies  Allergies are currently stable. Benign Prostatic Hypertrophy  Tolerating medication without adverse effects. Symptoms of hesitancy, nocturia, and dribbling are adequately controlled. No hematuria or dysuria            HPI    Past Medical History:   Diagnosis Date    Metastatic renal cell carcinoma (HCC)     Nasopharyngeal mass     Metastatic renal cell carcinoma, clear cell      Past Surgical History:   Procedure Laterality Date    ADRENALECTOMY Left 10/2017    NOSE SURGERY  2017    Renal cell carcinoma, metastatic clear cell, 2016, 2017    PARTIAL NEPHRECTOMY Left 10/2017    t3a n0 m0       Family History   Problem Relation Age of Onset    Breast Cancer Mother     Breast Cancer Sister     Cancer Brother        Social History     Tobacco Use    Smoking status: Never Smoker    Smokeless tobacco: Never Used   Substance Use Topics    Alcohol use: No     Current Outpatient Medications   Medication Sig Dispense Refill    famotidine (PEPCID) 20 MG tablet Take 1 tablet by mouth daily 30 tablet 5    cetirizine (ZYRTEC) 10 MG tablet Take 10 mg by mouth      tamsulosin (FLOMAX) 0.4 MG capsule Take 1 capsule by mouth every other day        No current facility-administered medications for this visit.       Allergies   Allergen Reactions    Nexium [Esomeprazole Magnesium] Rash    Prilosec [Omeprazole] Rash no edema. Normal gait. Neurological: alert. Psychiatric: normal mood and affect. His behavior is normal. Normal judgement and insight observed.       Recent Results (from the past 672 hour(s))   T4, Free    Collection Time: 03/05/20  8:05 AM   Result Value Ref Range    T4 Free 1.19 0.93 - 1.70 ng/dL   TSH without Reflex    Collection Time: 03/05/20  8:05 AM   Result Value Ref Range    TSH 0.607 0.270 - 4.200 uIU/mL   CBC Auto Differential    Collection Time: 03/05/20  8:05 AM   Result Value Ref Range    WBC 7.2 4.8 - 10.8 K/uL    RBC 5.07 4.70 - 6.10 M/uL    Hemoglobin 15.3 14.0 - 18.0 g/dL    Hematocrit 48.1 42.0 - 52.0 %    MCV 94.9 (H) 80.0 - 94.0 fL    MCH 30.2 27.0 - 31.0 pg    MCHC 31.8 (L) 33.0 - 37.0 g/dL    RDW 14.2 11.5 - 14.5 %    Platelets 567 682 - 270 K/uL    MPV 9.9 9.4 - 12.4 fL    Neutrophils % 52.2 50.0 - 65.0 %    Lymphocytes % 37.5 20.0 - 40.0 %    Monocytes % 7.2 0.0 - 10.0 %    Eosinophils % 2.2 0.0 - 5.0 %    Basophils % 0.6 0.0 - 1.0 %    Neutrophils Absolute 3.8 1.5 - 7.5 K/uL    Immature Granulocytes # 0.0 K/uL    Lymphocytes Absolute 2.7 1.1 - 4.5 K/uL    Monocytes Absolute 0.50 0.00 - 0.90 K/uL    Eosinophils Absolute 0.20 0.00 - 0.60 K/uL    Basophils Absolute 0.00 0.00 - 0.20 K/uL   Lipid Panel    Collection Time: 03/05/20  8:05 AM   Result Value Ref Range    Cholesterol, Total 210 (H) 160 - 199 mg/dL    Triglycerides 109 0 - 149 mg/dL    HDL 43 (L) 55 - 121 mg/dL    LDL Calculated 145 <100 mg/dL   Comprehensive Metabolic Panel    Collection Time: 03/05/20  8:05 AM   Result Value Ref Range    Sodium 144 136 - 145 mmol/L    Potassium 4.9 3.5 - 5.0 mmol/L    Chloride 105 98 - 111 mmol/L    CO2 26 22 - 29 mmol/L    Anion Gap 13 7 - 19 mmol/L    Glucose 114 (H) 74 - 109 mg/dL    BUN 23 8 - 23 mg/dL    CREATININE 0.9 0.5 - 1.2 mg/dL    GFR Non-African American >60 >60    Calcium 9.6 8.8 - 10.2 mg/dL    Total Protein 7.0 6.6 - 8.7 g/dL    Alb 4.5 3.5 - 5.2 g/dL    Total Bilirubin 0.5 0.2 - 1.2 mg/dL    Alkaline Phosphatase 79 40 - 130 U/L    ALT 22 5 - 41 U/L    AST 20 5 - 40 U/L   Psa screening    Collection Time: 03/05/20  8:05 AM   Result Value Ref Range    PSA 1.65 0.00 - 4.00 ng/mL             Assessment & Plan: The following diagnoses and conditions are stable with no further orders unless indicated:  1. Annual physical exam  Discussed lifestyle changes such as diet and exercise. Recommended eliminate processed food from diet such as sugar and fried foods. Recommended exercising at least 150 minutes/week. Try to do full body resistance training twice a week as well. Offered suggestions for calorie counting such as phone apps and online resources such as My fitness pal and Lose it. Discussed healthy weight. Thyroid studies normal, mucosa normal, renal function normal  Cholesterol borderline elevated. Discussed lifestyle modification  Lab Results   Component Value Date    CHOL 210 (H) 03/05/2020    CHOL 218 (H) 03/07/2019    CHOL 212 (H) 03/05/2018     Lab Results   Component Value Date    TRIG 109 03/05/2020    TRIG 104 03/07/2019    TRIG 124 03/05/2018     Lab Results   Component Value Date    HDL 43 (L) 03/05/2020    HDL 41 (L) 03/07/2019    HDL 44 (L) 03/05/2018     Lab Results   Component Value Date    LDLCALC 145 03/05/2020    LDLCALC 156 03/07/2019    [address] 03/05/2018     No results found for: LABVLDL, VLDL  No results found for: CHOLHDLRATIO    - Comprehensive Metabolic Panel; Future  - Lipid Panel; Future  - Psa screening; Future  - CBC Auto Differential; Future    2. Need for hepatitis C screening test    - Hepatitis C Antibody; Future    3. Encounter for immunization      4. Gastroesophageal reflux disease without esophagitis  Stable    5. Chronic nonseasonal allergic rhinitis due to pollen  Stable    6.  Benign prostatic hyperplasia without lower urinary tract symptoms  Stable            Return in about 1 year (around 3/16/2021) for Yearly Physical - 30 minute visit. Discussed use, benefit, and side effects of prescribed medications. All patient questions answered. Pt voiced understanding. Reviewed health maintenance. Instructedto continue current medications, diet and exercise. Patient agreed with treatmentplan. Follow up as directed.        Note dictated using [id number] [facility]

## 2020-09-18 ENCOUNTER — NURSE ONLY (OUTPATIENT)
Dept: FAMILY MEDICINE CLINIC | Age: 70
End: 2020-09-18
Payer: COMMERCIAL

## 2020-09-18 PROCEDURE — 90471 IMMUNIZATION ADMIN: CPT | Performed by: FAMILY MEDICINE

## 2020-09-18 PROCEDURE — 90694 VACC AIIV4 NO PRSRV 0.5ML IM: CPT | Performed by: FAMILY MEDICINE

## 2020-09-18 NOTE — PROGRESS NOTES
Vaccine Information Sheet, \"Influenza - Inactivated\" OR \"Live - Intranasal\"  given to Elida Cotton, or parent/legal guardian of  Elida Cotton and verbalized understanding. Patient responses:    Have you ever had a reaction to a flu vaccine? No  Are you able to eat eggs without adverse effects? Yes  Do you have any current illness? No  Have you ever had Guillian Newcomb Syndrome? No    Flu vaccine given per order. Please see immunization tab.

## 2021-03-12 ASSESSMENT — LIFESTYLE VARIABLES
HOW OFTEN DURING THE LAST YEAR HAVE YOU BEEN UNABLE TO REMEMBER WHAT HAPPENED THE NIGHT BEFORE BECAUSE YOU HAD BEEN DRINKING: 0
HAVE YOU OR SOMEONE ELSE BEEN INJURED AS A RESULT OF YOUR DRINKING: 0
HOW OFTEN DURING THE LAST YEAR HAVE YOU FAILED TO DO WHAT WAS NORMALLY EXPECTED FROM YOU BECAUSE OF DRINKING: 0
HOW MANY STANDARD DRINKS CONTAINING ALCOHOL DO YOU HAVE ON A TYPICAL DAY: 0
HOW OFTEN DO YOU HAVE SIX OR MORE DRINKS ON ONE OCCASION: 0
HAS A RELATIVE, FRIEND, DOCTOR, OR ANOTHER HEALTH PROFESSIONAL EXPRESSED CONCERN ABOUT YOUR DRINKING OR SUGGESTED YOU CUT DOWN: 0
HOW OFTEN DURING THE LAST YEAR HAVE YOU HAD A FEELING OF GUILT OR REMORSE AFTER DRINKING: 0
AUDIT TOTAL SCORE: 1
HOW OFTEN DO YOU HAVE A DRINK CONTAINING ALCOHOL: 1
HOW OFTEN DURING THE LAST YEAR HAVE YOU FOUND THAT YOU WERE NOT ABLE TO STOP DRINKING ONCE YOU HAD STARTED: 0

## 2021-03-12 ASSESSMENT — PATIENT HEALTH QUESTIONNAIRE - PHQ9
SUM OF ALL RESPONSES TO PHQ QUESTIONS 1-9: 0
SUM OF ALL RESPONSES TO PHQ QUESTIONS 1-9: 0
2. FEELING DOWN, DEPRESSED OR HOPELESS: 0
SUM OF ALL RESPONSES TO PHQ QUESTIONS 1-9: 0

## 2021-03-18 ASSESSMENT — LIFESTYLE VARIABLES
AUDIT-C TOTAL SCORE: 0
HOW OFTEN DURING THE LAST YEAR HAVE YOU NEEDED AN ALCOHOLIC DRINK FIRST THING IN THE MORNING TO GET YOURSELF GOING AFTER A NIGHT OF HEAVY DRINKING: NEVER
AUDIT TOTAL SCORE: 0
HOW OFTEN DURING THE LAST YEAR HAVE YOU FOUND THAT YOU WERE NOT ABLE TO STOP DRINKING ONCE YOU HAD STARTED: NEVER
HAVE YOU OR SOMEONE ELSE BEEN INJURED AS A RESULT OF YOUR DRINKING: NO
HOW OFTEN DURING THE LAST YEAR HAVE YOU HAD A FEELING OF GUILT OR REMORSE AFTER DRINKING: NEVER
HAS A RELATIVE, FRIEND, DOCTOR, OR ANOTHER HEALTH PROFESSIONAL EXPRESSED CONCERN ABOUT YOUR DRINKING OR SUGGESTED YOU CUT DOWN: NO
HOW OFTEN DO YOU HAVE A DRINK CONTAINING ALCOHOL: MONTHLY OR LESS
HOW OFTEN DO YOU HAVE SIX OR MORE DRINKS ON ONE OCCASION: NEVER
HOW OFTEN DURING THE LAST YEAR HAVE YOU FAILED TO DO WHAT WAS NORMALLY EXPECTED FROM YOU BECAUSE OF DRINKING: NEVER
HOW MANY STANDARD DRINKS CONTAINING ALCOHOL DO YOU HAVE ON A TYPICAL DAY: ONE OR TWO
HOW OFTEN DURING THE LAST YEAR HAVE YOU BEEN UNABLE TO REMEMBER WHAT HAPPENED THE NIGHT BEFORE BECAUSE YOU HAD BEEN DRINKING: NEVER

## 2021-03-28 ENCOUNTER — IMMUNIZATION (OUTPATIENT)
Age: 71
End: 2021-03-28
Payer: COMMERCIAL

## 2021-03-28 PROCEDURE — 91300 COVID-19, PFIZER VACCINE 30MCG/0.3ML DOSE: CPT | Performed by: FAMILY MEDICINE

## 2021-03-28 PROCEDURE — 0001A COVID-19, PFIZER VACCINE 30MCG/0.3ML DOSE: CPT | Performed by: FAMILY MEDICINE

## 2021-04-18 ENCOUNTER — IMMUNIZATION (OUTPATIENT)
Age: 71
End: 2021-04-18
Payer: COMMERCIAL

## 2021-04-18 PROCEDURE — 91300 COVID-19, PFIZER VACCINE 30MCG/0.3ML DOSE: CPT | Performed by: FAMILY MEDICINE

## 2021-04-18 PROCEDURE — 0002A PR IMM ADMN SARSCOV2 30MCG/0.3ML DIL RECON 2ND DOSE: CPT | Performed by: FAMILY MEDICINE

## 2021-05-10 ENCOUNTER — OFFICE VISIT (OUTPATIENT)
Dept: FAMILY MEDICINE CLINIC | Age: 71
End: 2021-05-10
Payer: COMMERCIAL

## 2021-05-10 VITALS
DIASTOLIC BLOOD PRESSURE: 80 MMHG | HEIGHT: 73 IN | OXYGEN SATURATION: 98 % | SYSTOLIC BLOOD PRESSURE: 138 MMHG | TEMPERATURE: 98.2 F | WEIGHT: 217.5 LBS | BODY MASS INDEX: 28.83 KG/M2 | HEART RATE: 116 BPM

## 2021-05-10 DIAGNOSIS — Z00.00 ANNUAL PHYSICAL EXAM: Primary | ICD-10-CM

## 2021-05-10 DIAGNOSIS — R73.9 HYPERGLYCEMIA: ICD-10-CM

## 2021-05-10 PROCEDURE — 99397 PER PM REEVAL EST PAT 65+ YR: CPT | Performed by: FAMILY MEDICINE

## 2021-05-10 RX ORDER — FAMOTIDINE 20 MG/1
20 TABLET, FILM COATED ORAL DAILY
Qty: 90 TABLET | Refills: 3 | Status: SHIPPED | OUTPATIENT
Start: 2021-05-10 | End: 2022-03-31 | Stop reason: ALTCHOICE

## 2021-05-10 ASSESSMENT — LIFESTYLE VARIABLES
HOW OFTEN DO YOU HAVE SIX OR MORE DRINKS ON ONE OCCASION: 0
HOW OFTEN DURING THE LAST YEAR HAVE YOU HAD A FEELING OF GUILT OR REMORSE AFTER DRINKING: 0
AUDIT TOTAL SCORE: 1
HOW MANY STANDARD DRINKS CONTAINING ALCOHOL DO YOU HAVE ON A TYPICAL DAY: 0
HOW OFTEN DURING THE LAST YEAR HAVE YOU FAILED TO DO WHAT WAS NORMALLY EXPECTED FROM YOU BECAUSE OF DRINKING: 0
AUDIT-C TOTAL SCORE: 1
HOW OFTEN DO YOU HAVE A DRINK CONTAINING ALCOHOL: 1

## 2021-05-10 ASSESSMENT — PATIENT HEALTH QUESTIONNAIRE - PHQ9
SUM OF ALL RESPONSES TO PHQ QUESTIONS 1-9: 0
SUM OF ALL RESPONSES TO PHQ QUESTIONS 1-9: 0
2. FEELING DOWN, DEPRESSED OR HOPELESS: 0

## 2021-05-10 NOTE — PATIENT INSTRUCTIONS
We are committed to providing you with the best care possible. In order to help us achieve these goals please remember to bring all medications, herbal products, and over the counter supplements with you to each visit. If your provider has ordered testing for you, please be sure to follow up with our office if you have not received results within 7 days after the testing took place. *If you receive a survey after visiting one of our offices, please take time to share your experience concerning your physician office visit. These surveys are confidential and no health information about you is shared. We are eager to improve for you and we are counting on your feedback to help make that happen.      _______________________________________________________________    voltaren gel applied three times a day as needed. The following may help for colds and allergies:    Antihistamines: Help nasal drainage, watery eyes, sneezing, sore throat.  Benadryl (diphenhydramine) 25mg every 6 hours as needed    Zyrtec (cetirizine) 10mg once a day   Allegra (fexofenadine) 180mg once a day   Claritin or Alavert (loratadine) 10mg once a day     Expectorants: Loosen mucous and help nasal and chest congestion   Mucinex 600mg twice a day     Nasal Steroid: Help nasal drainage, nasal congestion, sneezing, and sinus pressure   Nasacort 1 spray each nostril once a day   Flonase 1 spray each nostril once a day   Rhinocort 1 spray each nostril once a day    _______________________________________________________________    [place] Exercises for Vertigo: Care Instructions  Your Care Instructions  Simple exercises can help you regain your balance when you have vertigo. If you have Ménière's disease, benign paroxysmal positional vertigo (BPPV), or another inner ear problem, you may have vertigo off and on. Do these exercises first thing in the morning and before you go to bed.  You might get dizzy when you first start them. If this happens, try to do them for at least 5 minutes or about 10 times each exercise. Do a group of exercises at a time, starting at the top of the list. It may take several weeks before you can do all the exercises without feeling dizzy. Follow-up care is a key part of your treatment and safety. Be sure to make and go to all appointments, and call your doctor if you are having problems. It's also a good idea to know your test results and keep a list of the medicines you take. How can you care for yourself at home? Exercise 1  While sitting on the side of the bed and holding your head still:  · Look up as far as you can. · Look down as far as you can. · Look from side to side as far as you can. · Stretch your arm straight out in front of you. Focus on your index finger. Continue to focus on your finger while you bring it to your nose. Exercise 2  While sitting on the side of the bed:  · Bring your head as far back as you can. · Bring your head forward to touch your chin to your chest.  · Turn your head from side to side. · Do these exercises first with your eyes open. Then try with your eyes closed. Exercise 3  While sitting on the side of the bed:  · Shrug your shoulders straight upward, then relax them. · Bend over and try to touch the ground with your fingers. Then go back to a sitting position. · Toss a small ball from one hand to the other. Throw the ball higher than your eyes so you have to look up. Exercise 4  While standing (with someone close by if you feel uncomfortable):  · Repeat Exercise 1.  · Repeat Exercise 2.  · Pass a ball between your legs and above your head. · Sit down and then stand up. Repeat. Turn around in a [place] a different way each time you stand. · With someone close by to help you, try the above exercises with your eyes closed.   Exercise 5  In a room that is cleared of obstacles:  · Walk to a corner of the room, turn to your right, and walk back to the starting point. Now, repeat and turn left. · Walk up and down a slope. Now try stairs. · While holding on to someone's arm, try these exercises with your eyes closed. When should you call for help? Watch closely for changes in your health, and be sure to contact your doctor if:    · You do not get better as expected. Where can you learn more? Go to [email or web address] org and sign in to your Fotoup account. Enter V519 in the [facility] box to learn more about \"[place] Exercises for Vertigo: Care Instructions. \"     If you do not have an account, please click on the \"Sign Up Now\" link. Current as of: May 12, 2017  Content Version: 11.6  © 2994-2736 [employer]. Care instructions adapted under license by [facility] ([facility]). If you have questions about a medical condition or this instruction, always ask your healthcare professional. [place] [address] any warranty or liability for your use of this information.

## 2021-05-10 NOTE — PROGRESS NOTES
[facility] PHYSICIAN SERVICES  [facility]  [address] [address]  Dept: [phone]  Dept Fax: [phone]  Loc: [phone]    Lynette Arias is a 78 y.o. male who presents today for his medical conditions/complaints as noted below. Lynette Arias is here for Medicare AWV        HPI:   CC: Here today to discuss the following:    Has been having increased allergy issues lately. Post nasal drainage, ear pressure, and post nasal drainage. Took meclizine for vertigo. Continues to be followed at [facility] for [facility] cell [facility]. Disease is stable. HPI    Past Medical History:   Diagnosis Date    Metastatic renal cell carcinoma ([address]     Nasopharyngeal mass     Metastatic renal cell carcinoma, clear cell      Past Surgical History:   Procedure Laterality Date    ADRENALECTOMY Left 10/2017    NOSE SURGERY  12/2017    Renal cell carcinoma, metastatic clear cell, April 2016, December 2017    PARTIAL NEPHRECTOMY Left 10/2017    t3a n0 m0       Family History   Problem Relation Age of Onset    Breast Cancer Mother     Breast Cancer Sister     Cancer Brother        Social History     Tobacco Use    Smoking status: Never Smoker    Smokeless tobacco: Never Used   Substance Use Topics    Alcohol use: No     Current Outpatient Medications   Medication Sig Dispense Refill    famotidine (PEPCID) 20 MG tablet Take 1 tablet by mouth daily 90 tablet 3    cetirizine (ZYRTEC) 10 MG tablet Take 10 mg by mouth      tamsulosin (FLOMAX) 0.4 MG capsule Take 1 capsule by mouth every other day        No current facility-administered medications for this visit.       Allergies   Allergen Reactions    Nexium [Esomeprazole Magnesium] Rash    Prilosec [Omeprazole] Rash       Health Maintenance   Topic Date Due    DTaP/Tdap/Td vaccine (1 - Tdap) Never done    Diabetes screen  Never done    Colon cancer screen colonoscopy  Never done    Lipid screen 03/12/2026    Flu vaccine  Completed    Shingles Vaccine  Completed    Pneumococcal 65+ years Vaccine  Completed    COVID-19 Vaccine  Completed    Hepatitis C screen  Completed    Hepatitis A vaccine  Aged Out    Hepatitis B vaccine  Aged Out    Hib vaccine  Aged Out    Meningococcal (ACWY) vaccine  Aged Out       Subjective:      Review of Systems    Review of Systems   Constitutional: Negative for chills and fever. HENT: positive for congestion. Respiratory: Negative for cough, chest tightness and shortness of breath. Cardiovascular: Negative for chest pain, palpitations and leg swelling. Gastrointestinal: Negative for abdominal pain, anal bleeding, constipation, diarrhea and nausea. Genitourinary: Negative for difficulty urinating. Psychiatric/Behavioral: Negative. SeeHPI for visit specific review of symptoms. All others negative      Objective:   /80   Pulse 116   Temp 98.2 °F (36.8 °C)   Ht 6' 1\" (1.854 m)   Wt 217 lb 8 oz (98.7 kg)   SpO2 98%   BMI 28.70 kg/m²   Physical Exam  Physical Exam   Constitutional: He appears well-developed. Does not appear ill. Neck: Normal range of motion. Neck supple. No masses. Neck Symmetric. Normal tracheal position. No thyroid enlargement  Cardiovascular: Normal rate and regular rhythm. Exam reveals no friction rub. Carotid arteries: no bruit observed. No murmur heard. Respiratory:  Effort normal and breath sounds normal. No respiratory distress. No wheezes. No rales. No use of accessory muscles or intercostal retractions. Neurological: alert. Psychiatric: normal mood and affect. His behavior is normal. Normal judgement and insight observed. No results found for this or any previous visit (from the past 672 hour(s)). Assessment & Plan: The following diagnoses and conditions are stable with no further orders unless indicated:  1.  Annual physical exam  Lab Results   Component Value Date    CHOL 214 (H) 03/12/2021    CHOL 210 (H) 03/05/2020    CHOL 218 (H) 03/07/2019     Lab Results   Component Value Date    TRIG 124 03/12/2021    TRIG 109 03/05/2020    TRIG 104 03/07/2019     Lab Results   Component Value Date    HDL 37 (L) 03/12/2021    HDL 43 (L) 03/05/2020    HDL 41 (L) 03/07/2019     Lab Results   Component Value Date    LDLCALC 152 03/12/2021    LDLCALC 145 03/05/2020    LDLCALC 156 03/07/2019     No results found for: LABVLDL, VLDL  No results found for: CHOLHDLRATIO  Discussed lifestyle changes such as diet and exercise. Recommended eliminate processed food from diet such as sugar and fried foods. Recommended exercising at least 150 minutes/week. Try to do full body resistance training twice a week as well. Offered suggestions for calorie counting such as phone apps and online resources such as My fitness pal and Lose it. Discussed healthy weight. - Comprehensive Metabolic Panel; Future  - Lipid Panel; Future  - CBC Auto Differential; Future  - Hemoglobin A1C; Future    2. Hyperglycemia  Encouraged ada diet. - Comprehensive Metabolic Panel; Future  - Hemoglobin A1C; Future            Return in about 1 year (around 5/10/2022) for Yearly Physical - 30 minute visit. Discussed use, benefit, and side effects of prescribed medications. All patient questions answered. Pt voiced understanding. Reviewed health maintenance. Instructedto continue current medications, diet and exercise. Patient agreed with treatmentplan. Follow up as directed. Note dictated using Dragon Dictation software  Sometimes this dictation software makes erroneous transcriptions.

## 2021-09-21 ENCOUNTER — NURSE ONLY (OUTPATIENT)
Dept: FAMILY MEDICINE CLINIC | Age: 71
End: 2021-09-21
Payer: MEDICARE

## 2021-09-21 DIAGNOSIS — Z23 FLU VACCINE NEED: Primary | ICD-10-CM

## 2021-09-21 PROCEDURE — G0008 ADMIN INFLUENZA VIRUS VAC: HCPCS | Performed by: FAMILY MEDICINE

## 2021-09-21 PROCEDURE — 90694 VACC AIIV4 NO PRSRV 0.5ML IM: CPT | Performed by: FAMILY MEDICINE

## 2021-09-21 NOTE — PROGRESS NOTES
Vaccine Information Sheet, \"Influenza - Inactivated\" OR \"Live - Intranasal\"  given to [place] Paul, or parent/legal guardian of  [place] Paul and verbalized understanding. Patient responses:    Have you ever had a reaction to a flu vaccine? No  Are you able to eat eggs without adverse effects? Yes  Do you have any current illness? No  Have you ever had Guillian Loop Syndrome? No    Flu vaccine given per order. Please see immunization tab.

## 2022-02-28 ENCOUNTER — HOSPITAL ENCOUNTER (INPATIENT)
Age: 72
LOS: 12 days | Discharge: HOME | DRG: 439 | End: 2022-03-12
Attending: INTERNAL MEDICINE | Admitting: INTERNAL MEDICINE
Payer: MEDICARE

## 2022-02-28 ENCOUNTER — APPOINTMENT (OUTPATIENT)
Dept: CT IMAGING | Age: 72
DRG: 439 | End: 2022-02-28
Payer: MEDICARE

## 2022-02-28 DIAGNOSIS — K85.90 ACUTE PANCREATITIS, UNSPECIFIED COMPLICATION STATUS, UNSPECIFIED PANCREATITIS TYPE: Primary | ICD-10-CM

## 2022-02-28 DIAGNOSIS — K85.10 PANCREATITIS, GALLSTONE: ICD-10-CM

## 2022-02-28 LAB
ALBUMIN SERPL-MCNC: 4.4 G/DL (ref 3.5–5.2)
ALP BLD-CCNC: 87 U/L (ref 40–130)
ALT SERPL-CCNC: 13 U/L (ref 5–41)
ANION GAP SERPL CALCULATED.3IONS-SCNC: 15 MMOL/L (ref 7–19)
AST SERPL-CCNC: 18 U/L (ref 5–40)
BACTERIA: NEGATIVE /HPF
BASOPHILS ABSOLUTE: 0.1 K/UL (ref 0–0.2)
BASOPHILS RELATIVE PERCENT: 0.3 % (ref 0–1)
BILIRUB SERPL-MCNC: 0.6 MG/DL (ref 0.2–1.2)
BILIRUBIN URINE: NEGATIVE
BLOOD, URINE: NEGATIVE
BUN BLDV-MCNC: 25 MG/DL (ref 8–23)
CALCIUM SERPL-MCNC: 8.8 MG/DL (ref 8.8–10.2)
CHLORIDE BLD-SCNC: 102 MMOL/L (ref 98–111)
CLARITY: CLEAR
CO2: 22 MMOL/L (ref 22–29)
COLOR: YELLOW
CREAT SERPL-MCNC: 0.9 MG/DL (ref 0.5–1.2)
CRYSTALS, UA: ABNORMAL /HPF
EOSINOPHILS ABSOLUTE: 0 K/UL (ref 0–0.6)
EOSINOPHILS RELATIVE PERCENT: 0.2 % (ref 0–5)
EPITHELIAL CELLS, UA: 0 /HPF (ref 0–5)
GFR AFRICAN AMERICAN: >59
GFR NON-AFRICAN AMERICAN: >60
GLUCOSE BLD-MCNC: 141 MG/DL (ref 74–109)
GLUCOSE URINE: NEGATIVE MG/DL
HCT VFR BLD CALC: 48.8 % (ref 42–52)
HEMOGLOBIN: 15.5 G/DL (ref 14–18)
HYALINE CASTS: 3 /HPF (ref 0–8)
IMMATURE GRANULOCYTES #: 0.1 K/UL
KETONES, URINE: 80 MG/DL
LEUKOCYTE ESTERASE, URINE: ABNORMAL
LIPASE: >3000 U/L (ref 13–60)
LYMPHOCYTES ABSOLUTE: 2.5 K/UL (ref 1.1–4.5)
LYMPHOCYTES RELATIVE PERCENT: 12.8 % (ref 20–40)
MCH RBC QN AUTO: 30.2 PG (ref 27–31)
MCHC RBC AUTO-ENTMCNC: 31.8 G/DL (ref 33–37)
MCV RBC AUTO: 94.9 FL (ref 80–94)
MONOCYTES ABSOLUTE: 1 K/UL (ref 0–0.9)
MONOCYTES RELATIVE PERCENT: 5.2 % (ref 0–10)
NEUTROPHILS ABSOLUTE: 15.6 K/UL (ref 1.5–7.5)
NEUTROPHILS RELATIVE PERCENT: 81 % (ref 50–65)
NITRITE, URINE: NEGATIVE
PDW BLD-RTO: 14 % (ref 11.5–14.5)
PH UA: 5 (ref 5–8)
PLATELET # BLD: 185 K/UL (ref 130–400)
PMV BLD AUTO: 10.3 FL (ref 9.4–12.4)
POTASSIUM REFLEX MAGNESIUM: 4.3 MMOL/L (ref 3.5–5)
PROTEIN UA: NEGATIVE MG/DL
RBC # BLD: 5.14 M/UL (ref 4.7–6.1)
RBC UA: 0 /HPF (ref 0–4)
SARS-COV-2, NAAT: NOT DETECTED
SODIUM BLD-SCNC: 139 MMOL/L (ref 136–145)
SPECIFIC GRAVITY UA: >=1.045 (ref 1–1.03)
TOTAL PROTEIN: 7 G/DL (ref 6.6–8.7)
TROPONIN: <0.01 NG/ML (ref 0–0.03)
UROBILINOGEN, URINE: 0.2 E.U./DL
WBC # BLD: 19.3 K/UL (ref 4.8–10.8)
WBC UA: 7 /HPF (ref 0–5)

## 2022-02-28 PROCEDURE — 99283 EMERGENCY DEPT VISIT LOW MDM: CPT

## 2022-02-28 PROCEDURE — 2580000003 HC RX 258: Performed by: PHYSICIAN ASSISTANT

## 2022-02-28 PROCEDURE — 87086 URINE CULTURE/COLONY COUNT: CPT

## 2022-02-28 PROCEDURE — 93005 ELECTROCARDIOGRAM TRACING: CPT | Performed by: PHYSICIAN ASSISTANT

## 2022-02-28 PROCEDURE — 87635 SARS-COV-2 COVID-19 AMP PRB: CPT

## 2022-02-28 PROCEDURE — 81001 URINALYSIS AUTO W/SCOPE: CPT

## 2022-02-28 PROCEDURE — 85025 COMPLETE CBC W/AUTO DIFF WBC: CPT

## 2022-02-28 PROCEDURE — 84484 ASSAY OF TROPONIN QUANT: CPT

## 2022-02-28 PROCEDURE — 96375 TX/PRO/DX INJ NEW DRUG ADDON: CPT

## 2022-02-28 PROCEDURE — 6360000002 HC RX W HCPCS: Performed by: PHYSICIAN ASSISTANT

## 2022-02-28 PROCEDURE — 1210000000 HC MED SURG R&B

## 2022-02-28 PROCEDURE — 80053 COMPREHEN METABOLIC PANEL: CPT

## 2022-02-28 PROCEDURE — 83690 ASSAY OF LIPASE: CPT

## 2022-02-28 PROCEDURE — 6360000004 HC RX CONTRAST MEDICATION: Performed by: PHYSICIAN ASSISTANT

## 2022-02-28 PROCEDURE — 74177 CT ABD & PELVIS W/CONTRAST: CPT

## 2022-02-28 PROCEDURE — 96374 THER/PROPH/DIAG INJ IV PUSH: CPT

## 2022-02-28 PROCEDURE — 36415 COLL VENOUS BLD VENIPUNCTURE: CPT

## 2022-02-28 RX ORDER — POLYETHYLENE GLYCOL 3350 17 G/17G
17 POWDER, FOR SOLUTION ORAL DAILY PRN
Status: DISCONTINUED | OUTPATIENT
Start: 2022-02-28 | End: 2022-03-12 | Stop reason: HOSPADM

## 2022-02-28 RX ORDER — 0.9 % SODIUM CHLORIDE 0.9 %
500 INTRAVENOUS SOLUTION INTRAVENOUS ONCE
Status: COMPLETED | OUTPATIENT
Start: 2022-02-28 | End: 2022-03-01

## 2022-02-28 RX ORDER — ONDANSETRON 4 MG/1
4 TABLET, ORALLY DISINTEGRATING ORAL EVERY 8 HOURS PRN
Status: DISCONTINUED | OUTPATIENT
Start: 2022-02-28 | End: 2022-03-12 | Stop reason: HOSPADM

## 2022-02-28 RX ORDER — ACETAMINOPHEN 325 MG/1
650 TABLET ORAL EVERY 6 HOURS PRN
Status: DISCONTINUED | OUTPATIENT
Start: 2022-02-28 | End: 2022-03-12 | Stop reason: HOSPADM

## 2022-02-28 RX ORDER — ONDANSETRON 2 MG/ML
4 INJECTION INTRAMUSCULAR; INTRAVENOUS EVERY 6 HOURS PRN
Status: DISCONTINUED | OUTPATIENT
Start: 2022-02-28 | End: 2022-03-12 | Stop reason: HOSPADM

## 2022-02-28 RX ORDER — 0.9 % SODIUM CHLORIDE 0.9 %
1000 INTRAVENOUS SOLUTION INTRAVENOUS ONCE
Status: COMPLETED | OUTPATIENT
Start: 2022-02-28 | End: 2022-03-01

## 2022-02-28 RX ORDER — SODIUM CHLORIDE 0.9 % (FLUSH) 0.9 %
5-40 SYRINGE (ML) INJECTION PRN
Status: DISCONTINUED | OUTPATIENT
Start: 2022-02-28 | End: 2022-03-12 | Stop reason: HOSPADM

## 2022-02-28 RX ORDER — MORPHINE SULFATE 4 MG/ML
4 INJECTION, SOLUTION INTRAMUSCULAR; INTRAVENOUS ONCE
Status: COMPLETED | OUTPATIENT
Start: 2022-02-28 | End: 2022-02-28

## 2022-02-28 RX ORDER — SODIUM CHLORIDE 0.9 % (FLUSH) 0.9 %
5-40 SYRINGE (ML) INJECTION EVERY 12 HOURS SCHEDULED
Status: DISCONTINUED | OUTPATIENT
Start: 2022-02-28 | End: 2022-03-12 | Stop reason: HOSPADM

## 2022-02-28 RX ORDER — HYDROMORPHONE HYDROCHLORIDE 1 MG/ML
1 INJECTION, SOLUTION INTRAMUSCULAR; INTRAVENOUS; SUBCUTANEOUS ONCE
Status: COMPLETED | OUTPATIENT
Start: 2022-02-28 | End: 2022-02-28

## 2022-02-28 RX ORDER — ONDANSETRON 2 MG/ML
4 INJECTION INTRAMUSCULAR; INTRAVENOUS ONCE
Status: COMPLETED | OUTPATIENT
Start: 2022-02-28 | End: 2022-02-28

## 2022-02-28 RX ORDER — SODIUM CHLORIDE 9 MG/ML
25 INJECTION, SOLUTION INTRAVENOUS CONTINUOUS
Status: DISCONTINUED | OUTPATIENT
Start: 2022-03-01 | End: 2022-03-01

## 2022-02-28 RX ORDER — ACETAMINOPHEN 650 MG/1
650 SUPPOSITORY RECTAL EVERY 6 HOURS PRN
Status: DISCONTINUED | OUTPATIENT
Start: 2022-02-28 | End: 2022-03-12 | Stop reason: HOSPADM

## 2022-02-28 RX ADMIN — HYDROMORPHONE HYDROCHLORIDE 1 MG: 1 INJECTION, SOLUTION INTRAMUSCULAR; INTRAVENOUS; SUBCUTANEOUS at 22:33

## 2022-02-28 RX ADMIN — IOPAMIDOL 80 ML: 755 INJECTION, SOLUTION INTRAVENOUS at 20:38

## 2022-02-28 RX ADMIN — MORPHINE SULFATE 4 MG: 4 INJECTION INTRAVENOUS at 20:04

## 2022-02-28 RX ADMIN — PIPERACILLIN AND TAZOBACTAM 3375 MG: 3; .375 INJECTION, POWDER, LYOPHILIZED, FOR SOLUTION INTRAVENOUS at 23:19

## 2022-02-28 RX ADMIN — SODIUM CHLORIDE 1000 ML: 9 INJECTION, SOLUTION INTRAVENOUS at 23:18

## 2022-02-28 RX ADMIN — SODIUM CHLORIDE 500 ML: 9 INJECTION, SOLUTION INTRAVENOUS at 20:04

## 2022-02-28 RX ADMIN — ONDANSETRON 4 MG: 2 INJECTION INTRAMUSCULAR; INTRAVENOUS at 20:04

## 2022-02-28 ASSESSMENT — ENCOUNTER SYMPTOMS
SHORTNESS OF BREATH: 0
NAUSEA: 1
CONSTIPATION: 0
ABDOMINAL PAIN: 1
DIARRHEA: 0
BACK PAIN: 1
VOMITING: 1

## 2022-02-28 ASSESSMENT — PAIN SCALES - GENERAL
PAINLEVEL_OUTOF10: 10
PAINLEVEL_OUTOF10: 9

## 2022-03-01 ENCOUNTER — APPOINTMENT (OUTPATIENT)
Dept: ULTRASOUND IMAGING | Age: 72
DRG: 439 | End: 2022-03-01
Payer: MEDICARE

## 2022-03-01 LAB
ALBUMIN SERPL-MCNC: 4.3 G/DL (ref 3.5–5.2)
ALP BLD-CCNC: 81 U/L (ref 40–130)
ALT SERPL-CCNC: 13 U/L (ref 5–41)
ANION GAP SERPL CALCULATED.3IONS-SCNC: 15 MMOL/L (ref 7–19)
AST SERPL-CCNC: 18 U/L (ref 5–40)
BILIRUB SERPL-MCNC: 0.6 MG/DL (ref 0.2–1.2)
BUN BLDV-MCNC: 23 MG/DL (ref 8–23)
CALCIUM SERPL-MCNC: 8.3 MG/DL (ref 8.8–10.2)
CHLORIDE BLD-SCNC: 109 MMOL/L (ref 98–111)
CO2: 18 MMOL/L (ref 22–29)
CREAT SERPL-MCNC: 0.8 MG/DL (ref 0.5–1.2)
EKG P AXIS: 56 DEGREES
EKG P-R INTERVAL: 180 MS
EKG Q-T INTERVAL: 366 MS
EKG QRS DURATION: 92 MS
EKG QTC CALCULATION (BAZETT): 406 MS
EKG T AXIS: 48 DEGREES
GFR AFRICAN AMERICAN: >59
GFR NON-AFRICAN AMERICAN: >60
GLUCOSE BLD-MCNC: 164 MG/DL (ref 74–109)
LACTIC ACID: 1.7 MMOL/L (ref 0.5–1.9)
LIPASE: >3000 U/L (ref 13–60)
POTASSIUM REFLEX MAGNESIUM: 4.4 MMOL/L (ref 3.5–5)
SODIUM BLD-SCNC: 142 MMOL/L (ref 136–145)
TOTAL PROTEIN: 6.1 G/DL (ref 6.6–8.7)
TRIGL SERPL-MCNC: 49 MG/DL (ref 0–149)

## 2022-03-01 PROCEDURE — 6360000002 HC RX W HCPCS: Performed by: STUDENT IN AN ORGANIZED HEALTH CARE EDUCATION/TRAINING PROGRAM

## 2022-03-01 PROCEDURE — 6370000000 HC RX 637 (ALT 250 FOR IP): Performed by: STUDENT IN AN ORGANIZED HEALTH CARE EDUCATION/TRAINING PROGRAM

## 2022-03-01 PROCEDURE — 93010 ELECTROCARDIOGRAM REPORT: CPT | Performed by: INTERNAL MEDICINE

## 2022-03-01 PROCEDURE — 6360000002 HC RX W HCPCS: Performed by: INTERNAL MEDICINE

## 2022-03-01 PROCEDURE — 2580000003 HC RX 258: Performed by: STUDENT IN AN ORGANIZED HEALTH CARE EDUCATION/TRAINING PROGRAM

## 2022-03-01 PROCEDURE — 2500000003 HC RX 250 WO HCPCS: Performed by: STUDENT IN AN ORGANIZED HEALTH CARE EDUCATION/TRAINING PROGRAM

## 2022-03-01 PROCEDURE — 87040 BLOOD CULTURE FOR BACTERIA: CPT

## 2022-03-01 PROCEDURE — 99221 1ST HOSP IP/OBS SF/LOW 40: CPT | Performed by: SURGERY

## 2022-03-01 PROCEDURE — 1210000000 HC MED SURG R&B

## 2022-03-01 PROCEDURE — 2580000003 HC RX 258: Performed by: INTERNAL MEDICINE

## 2022-03-01 PROCEDURE — 6370000000 HC RX 637 (ALT 250 FOR IP): Performed by: INTERNAL MEDICINE

## 2022-03-01 PROCEDURE — 83605 ASSAY OF LACTIC ACID: CPT

## 2022-03-01 PROCEDURE — 84478 ASSAY OF TRIGLYCERIDES: CPT

## 2022-03-01 PROCEDURE — 36415 COLL VENOUS BLD VENIPUNCTURE: CPT

## 2022-03-01 PROCEDURE — 76705 ECHO EXAM OF ABDOMEN: CPT

## 2022-03-01 PROCEDURE — 80053 COMPREHEN METABOLIC PANEL: CPT

## 2022-03-01 RX ORDER — M-VIT,TX,IRON,MINS/CALC/FOLIC 27MG-0.4MG
1 TABLET ORAL DAILY
COMMUNITY
End: 2022-04-01 | Stop reason: ALTCHOICE

## 2022-03-01 RX ORDER — NIACIN 100 MG
100 TABLET ORAL
COMMUNITY
End: 2022-04-01 | Stop reason: ALTCHOICE

## 2022-03-01 RX ORDER — KETOROLAC TROMETHAMINE 30 MG/ML
15 INJECTION, SOLUTION INTRAMUSCULAR; INTRAVENOUS EVERY 6 HOURS
Status: DISCONTINUED | OUTPATIENT
Start: 2022-03-01 | End: 2022-03-01

## 2022-03-01 RX ORDER — ASCORBIC ACID 500 MG
500 TABLET ORAL DAILY
COMMUNITY
End: 2022-04-01 | Stop reason: ALTCHOICE

## 2022-03-01 RX ORDER — FAMOTIDINE 20 MG/1
20 TABLET, FILM COATED ORAL DAILY
Status: DISCONTINUED | OUTPATIENT
Start: 2022-03-01 | End: 2022-03-08

## 2022-03-01 RX ORDER — OXYCODONE AND ACETAMINOPHEN 7.5; 325 MG/1; MG/1
1 TABLET ORAL ONCE
Status: COMPLETED | OUTPATIENT
Start: 2022-03-01 | End: 2022-03-01

## 2022-03-01 RX ORDER — CETIRIZINE HYDROCHLORIDE 10 MG/1
10 TABLET ORAL DAILY
Status: DISCONTINUED | OUTPATIENT
Start: 2022-03-01 | End: 2022-03-08

## 2022-03-01 RX ORDER — SODIUM CHLORIDE, SODIUM LACTATE, POTASSIUM CHLORIDE, CALCIUM CHLORIDE 600; 310; 30; 20 MG/100ML; MG/100ML; MG/100ML; MG/100ML
INJECTION, SOLUTION INTRAVENOUS CONTINUOUS
Status: ACTIVE | OUTPATIENT
Start: 2022-03-01 | End: 2022-03-01

## 2022-03-01 RX ORDER — MULTIVIT WITH MINERALS/LUTEIN
1000 TABLET ORAL DAILY
COMMUNITY
End: 2022-04-01 | Stop reason: ALTCHOICE

## 2022-03-01 RX ORDER — SODIUM CHLORIDE, SODIUM LACTATE, POTASSIUM CHLORIDE, CALCIUM CHLORIDE 600; 310; 30; 20 MG/100ML; MG/100ML; MG/100ML; MG/100ML
INJECTION, SOLUTION INTRAVENOUS CONTINUOUS
Status: DISCONTINUED | OUTPATIENT
Start: 2022-03-01 | End: 2022-03-05

## 2022-03-01 RX ORDER — TAMSULOSIN HYDROCHLORIDE 0.4 MG/1
0.4 CAPSULE ORAL EVERY OTHER DAY
Status: DISCONTINUED | OUTPATIENT
Start: 2022-03-01 | End: 2022-03-02

## 2022-03-01 RX ORDER — OXYCODONE AND ACETAMINOPHEN 10; 325 MG/1; MG/1
1 TABLET ORAL EVERY 4 HOURS PRN
Status: DISCONTINUED | OUTPATIENT
Start: 2022-03-01 | End: 2022-03-12 | Stop reason: HOSPADM

## 2022-03-01 RX ORDER — HYDROMORPHONE HYDROCHLORIDE 1 MG/ML
0.5 INJECTION, SOLUTION INTRAMUSCULAR; INTRAVENOUS; SUBCUTANEOUS EVERY 4 HOURS PRN
Status: DISCONTINUED | OUTPATIENT
Start: 2022-03-01 | End: 2022-03-12 | Stop reason: HOSPADM

## 2022-03-01 RX ADMIN — FAMOTIDINE 20 MG: 10 INJECTION, SOLUTION INTRAVENOUS at 21:33

## 2022-03-01 RX ADMIN — ENOXAPARIN SODIUM 40 MG: 100 INJECTION SUBCUTANEOUS at 08:27

## 2022-03-01 RX ADMIN — SODIUM CHLORIDE 25 ML: 9 INJECTION, SOLUTION INTRAVENOUS at 01:51

## 2022-03-01 RX ADMIN — PIPERACILLIN AND TAZOBACTAM 3375 MG: 3; .375 INJECTION, POWDER, LYOPHILIZED, FOR SOLUTION INTRAVENOUS at 05:57

## 2022-03-01 RX ADMIN — ONDANSETRON 4 MG: 2 INJECTION INTRAMUSCULAR; INTRAVENOUS at 00:49

## 2022-03-01 RX ADMIN — FAMOTIDINE 20 MG: 10 INJECTION, SOLUTION INTRAVENOUS at 12:01

## 2022-03-01 RX ADMIN — TAMSULOSIN HYDROCHLORIDE 0.4 MG: 0.4 CAPSULE ORAL at 11:55

## 2022-03-01 RX ADMIN — SODIUM CHLORIDE, POTASSIUM CHLORIDE, SODIUM LACTATE AND CALCIUM CHLORIDE: 600; 310; 30; 20 INJECTION, SOLUTION INTRAVENOUS at 08:25

## 2022-03-01 RX ADMIN — KETOROLAC TROMETHAMINE 15 MG: 30 INJECTION, SOLUTION INTRAMUSCULAR; INTRAVENOUS at 08:26

## 2022-03-01 RX ADMIN — SODIUM CHLORIDE, PRESERVATIVE FREE 10 ML: 5 INJECTION INTRAVENOUS at 08:30

## 2022-03-01 RX ADMIN — OXYCODONE AND ACETAMINOPHEN 1 TABLET: 10; 325 TABLET ORAL at 04:38

## 2022-03-01 RX ADMIN — PIPERACILLIN AND TAZOBACTAM 3375 MG: 3; .375 INJECTION, POWDER, LYOPHILIZED, FOR SOLUTION INTRAVENOUS at 14:45

## 2022-03-01 RX ADMIN — OXYCODONE HYDROCHLORIDE AND ACETAMINOPHEN 1 TABLET: 7.5; 325 TABLET ORAL at 00:46

## 2022-03-01 RX ADMIN — HYDROMORPHONE HYDROCHLORIDE 0.5 MG: 1 INJECTION, SOLUTION INTRAMUSCULAR; INTRAVENOUS; SUBCUTANEOUS at 19:50

## 2022-03-01 RX ADMIN — SODIUM CHLORIDE, SODIUM LACTATE, POTASSIUM CHLORIDE, AND CALCIUM CHLORIDE: 600; 310; 30; 20 INJECTION, SOLUTION INTRAVENOUS at 16:34

## 2022-03-01 RX ADMIN — HYDROMORPHONE HYDROCHLORIDE 0.5 MG: 1 INJECTION, SOLUTION INTRAMUSCULAR; INTRAVENOUS; SUBCUTANEOUS at 08:27

## 2022-03-01 RX ADMIN — KETOROLAC TROMETHAMINE 15 MG: 30 INJECTION, SOLUTION INTRAMUSCULAR; INTRAVENOUS at 02:41

## 2022-03-01 RX ADMIN — HYDROMORPHONE HYDROCHLORIDE 0.5 MG: 1 INJECTION, SOLUTION INTRAMUSCULAR; INTRAVENOUS; SUBCUTANEOUS at 12:05

## 2022-03-01 ASSESSMENT — PAIN SCALES - GENERAL
PAINLEVEL_OUTOF10: 8
PAINLEVEL_OUTOF10: 6
PAINLEVEL_OUTOF10: 5
PAINLEVEL_OUTOF10: 7
PAINLEVEL_OUTOF10: 10
PAINLEVEL_OUTOF10: 2
PAINLEVEL_OUTOF10: 8
PAINLEVEL_OUTOF10: 4

## 2022-03-01 NOTE — PROGRESS NOTES
Pt called out, stating abdominal pain is increasing, 8/10. He states the toradol helped slightly but the percocet did not help the pain. Sent message to Dr. Mahamed Damon requesting stronger pain meds to hopefully tide him over until day shift before the pain in unbearable.  Electronically signed by Sylvester Isaacs RN on 3/1/2022 at 6:03 AM

## 2022-03-01 NOTE — CONSULTS
Consults   General Surgery   3/1/22    BP (!) 155/70   Pulse 96   Temp 98.6 °F (37 °C) (Temporal)   Resp 18   Wt 210 lb 14.4 oz (95.7 kg)   SpO2 95%   BMI 27.82 kg/m²     This 74 yo male presented to ED last night in severe pain, epigastric and central abdomen, vomiting. He had Lipase>3000 and CT findings of gallstone pancreatitis. Onset was acute, the day of admission. He has a history of left renal cancer and has undergone partial left nephrectomy and adrenalectomy. He says the oncologists are watching a few \"suspicious\" areas but that has been for a few years. He knew from his imaging at [facility] that he had a gallstone. He has not had prior pancreatitis. On exam, he is not jaundiced, bili and alk phos are normal. He appears quite uncomfortable and has stopped vomiting but still feels nauseated. The abdomen is distended and quite tender.   WBC count 19.3K    CT does not show pancreatic necrosis    Imp gallstone pancreatitis    Rec supportive care as presently ordered and interval lap umm after acute pancreatitis improves

## 2022-03-01 NOTE — ED PROVIDER NOTES
[facility]  eMERGENCY dEPARTMENT eNCOUnter      Pt Name: Rizwana Calvert  MRN: [id number]  [place] 1950  Date of evaluation: 2/28/2022  Provider: Irasema Xiao Dr       Chief Complaint   Patient presents with    Abdominal Pain     all over but worse in LRQ         HISTORY OF PRESENT ILLNESS   (Location/Symptom, Timing/Onset,Context/Setting, Quality, Duration, Modifying Factors, Severity)  Note limiting factors. Rizwana Calvert is a 73 y.o. male who presents to the emergency department with epigastric pain. He states that he began feeling bloated and having worsening pain over the last few days. It has progressively worsened to be severe today. He states that the pain is in the epigastric region and does radiate to his back. He denies any associated chest pain or shortness of breath. He states that he has had nausea with dry heaving. He denies any stool changes. He denies any fever or chills. He denies any alcohol use. Is a history of kidney cancer with personal nephrectomy and adrenalectomy. HPI    NursingNotes were reviewed. REVIEW OF SYSTEMS    (2-9 systems for level 4, 10 or more for level 5)     Review of Systems   Constitutional: Negative for chills and fever. Respiratory: Negative for shortness of breath. Cardiovascular: Negative for chest pain. Gastrointestinal: Positive for abdominal pain, nausea and vomiting. Negative for constipation and diarrhea. Genitourinary: Negative for difficulty urinating, dysuria, flank pain and hematuria. Musculoskeletal: Positive for back pain. Neurological: Negative for dizziness and headaches. All other systems reviewed and are negative.            PAST MEDICALHISTORY     Past Medical History:   Diagnosis Date    Metastatic renal cell carcinoma (HCC)     Nasopharyngeal mass     Metastatic renal cell carcinoma, clear cell         SURGICAL HISTORY       Past Surgical History:   Procedure Laterality Date    ADRENALECTOMY Left 10/2017    NOSE SURGERY  12/2017    Renal cell carcinoma, metastatic clear cell, April 2016, December 2017    PARTIAL NEPHRECTOMY Left 10/2017    t3a n0 m0         CURRENT MEDICATIONS     Previous Medications    CETIRIZINE (ZYRTEC) 10 MG TABLET    Take 10 mg by mouth    FAMOTIDINE (PEPCID) 20 MG TABLET    Take 1 tablet by mouth daily    TAMSULOSIN (FLOMAX) 0.4 MG CAPSULE    Take 1 capsule by mouth every other day        ALLERGIES     Nexium [esomeprazole magnesium] and Prilosec [omeprazole]    FAMILY HISTORY       Family History   Problem Relation Age of Onset    Breast Cancer Mother     Breast Cancer Sister     Cancer Brother           SOCIAL HISTORY       Social History     Socioeconomic History    Marital status:      Spouse name: None    Number of children: None    Years of education: None    Highest education level: None   Occupational History    None   Tobacco Use    Smoking status: Never Smoker    Smokeless tobacco: Never Used   Vaping Use    Vaping Use: Never used   Substance and Sexual Activity    Alcohol use: No    Drug use: No    Sexual activity: None   Other Topics Concern    None   Social History Narrative    None     Social Determinants of Health     Financial Resource Strain:     Difficulty of Paying Living Expenses: Not on file   Food Insecurity:     Worried About Running Out of Food in the Last Year: Not on file    Fabio of Food in the Last Year: Not on file   Transportation Needs:     Lack of Transportation (Medical): Not on file    Lack of Transportation (Non-Medical):  Not on file   Physical Activity:     Days of Exercise per Week: Not on file    Minutes of Exercise per Session: Not on file   Stress:     Feeling of Stress : Not on file   Social Connections:     Frequency of Communication with Friends and Family: Not on file    Frequency of Social Gatherings with Friends and Family: Not on file    Attends [employer] Services: Not on file   [facility] Active Member of Clubs or Organizations: Not on file    Attends Club or Organization Meetings: Not on file    Marital Status: Not on file   Intimate Partner Violence:     Fear of Current or Ex-Partner: Not on file    Emotionally Abused: Not on file    Physically Abused: Not on file    Sexually Abused: Not on file   Housing Stability:     Unable to Pay for Housing in the Last Year: Not on file    Number of [place] in the Last Year: Not on file    Unstable Housing in the Last Year: Not on file       SCREENINGS             PHYSICAL EXAM    (up to 7 for level 4, 8 or more for level 5)     ED Triage Vitals [02/28/22 1930]   BP Temp Temp src Pulse Resp SpO2 Height Weight   (!) 163/73 97.8 °F (36.6 °C) -- 84 17 96 % -- 205 lb (93 kg)       Physical Exam  Vitals and nursing note reviewed. Constitutional:       General: He is not in acute distress. Appearance: He is well-developed. He is ill-appearing. He is not toxic-appearing. Comments: Appears uncomfortable   Cardiovascular:      Rate and Rhythm: Normal rate and regular rhythm. Heart sounds: Normal heart sounds. Pulmonary:      Effort: Pulmonary effort is normal. No respiratory distress. Breath sounds: Normal breath sounds. Chest:      Chest wall: No tenderness. Abdominal:      General: There is distension (mild). Palpations: Abdomen is soft. Tenderness: There is generalized abdominal tenderness and tenderness in the epigastric area. There is no right CVA tenderness or left CVA tenderness. Skin:     General: Skin is warm and dry. Neurological:      General: No focal deficit present. Mental Status: He is alert and oriented to person, place, and time.          DIAGNOSTIC RESULTS     EKG: All EKG's areinterpreted by the Emergency Department Physician who either signs or Co-signs this chart in the absence of a cardiologist.    EKG interpreted by attending, normal sinus rhythm at a rate of 83, no STEMI or acute ischemia, QRST 107, QTc 432, nonspecific ST change    RADIOLOGY:  Non-plain film images such as CT, Ultrasound and MRI are read by the radiologist. Plain radiographic images are visualized and preliminarily interpreted bythe emergency physician with the below findings:      CT ABDOMEN PELVIS W IV CONTRAST Additional Contrast? None   Final Result   1. Rather extensive pancreatitis with extensive phlegmonous   inflammatory changes surrounding the pancreatic gland. There is also   inflammation within the marvel hepatis and associated with the   gallbladder. A gallstone is identified within the gallbladder lumen. There is free fluid within the pelvis. Inflammatory change extends   inferiorly from the pancreatic gland within the anterior pararenal   space along the anterior renal fascia and right paracolic gutter. 2. No evidence of pancreatic necrosis. There is mild constipation with   increased stool within the right and transverse colon. There is no   obstruction or free air. The appendix is normal in appearance. 3. Bibasilar atelectasis. 4. Mild steatosis of the liver. 5. Cortical cysts of the kidneys. No evidence of nephrolithiasis or   obstructive uropathy. 6. Prostatic enlargement. Mild prominence of the urinary bladder   wall. .    Signed by Dr Miguel Morales:  Hortensia Rosarioas - Abnormal; Notable for the following components:       Result Value    WBC 19.3 (*)     MCV 94.9 (*)     MCHC 31.8 (*)     Neutrophils % 81.0 (*)     Lymphocytes % 12.8 (*)     Neutrophils Absolute 15.6 (*)     Monocytes Absolute 1.00 (*)     All other components within normal limits   COMPREHENSIVE METABOLIC PANEL W/ REFLEX TO MG FOR LOW K - Abnormal; Notable for the following components:    Glucose 141 (*)     BUN 25 (*)     All other components within normal limits   LIPASE - Abnormal; Notable for the following components:    Lipase >3,000 (*)     All other components within normal limits   URINALYSIS WITH MICROSCOPIC - Abnormal; Notable for the following components:    Ketones, Urine 80 (*)     Leukocyte Esterase, Urine TRACE (*)     Bacteria, UA NEGATIVE (*)     Crystals, UA NEG (*)     WBC, UA 7 (*)     All other components within normal limits   CULTURE, URINE   COVID-19, RAPID   TROPONIN       All other labs were within normal range or not returned as of this dictation. EMERGENCY DEPARTMENT COURSE and DIFFERENTIAL DIAGNOSIS/MDM:   Vitals:    Vitals:    02/28/22 1930   BP: (!) 163/73   Pulse: 84   Resp: 17   Temp: 97.8 °F (36.6 °C)   SpO2: 96%   Weight: 205 lb (93 kg)       MDM  Patient is a 72-year-old male presents with epigastric pain. On physical exam, he does have generalized tenderness of the abdomen but it is significantly worse at the epigastric region. His vital signs show hypertension but he is afebrile and does not have any signs of tachycardia or sepsis. His white count is concerning for leukocytosis. Lipase is also elevated over 3000. Blood work is otherwise unremarkable. CT does confirm extensive pancreatitis. The patient was given fluids and pain medication in the ER. He also was started on a dose of Zosyn. I discussed with the patient the plan for admission and he is agreeable. Hospitalist has accepted the patient. CONSULTS:  Dr Jed Gupta, hospitalist    FINAL IMPRESSION      1.  Acute pancreatitis, unspecified complication status, unspecified pancreatitis type          DISPOSITION/PLAN   DISPOSITION Decision To Admit 02/28/2022 10:01:31 PM      (Please note that portions of this note were completed with a voice recognition program.  Efforts were made to edit thedictations but occasionally words are mis-transcribed.)    Rajiv Pablo, Venus Durán (electronically signed)     Venus Raygoza  02/28/22 [id number]

## 2022-03-01 NOTE — H&P
[place], [place], [address]    DEPARTMENT OF HOSPITALIST MEDICINE      HISTORY & PHYSICAL:          REASON FOR ADMISSION:  Chief Complaint   Patient presents with    Abdominal Pain     all over but worse in LRQ        HISTORY OF PRESENT ILLNESS:  Ivet Castro is an 73 y.o. male with medical history significant for a metastatic renal cell carcinoma had undergone partial nephrectomy with the adrenal on that side. The patient suddenly have abdominal pain 6 PM on the day of presentation the pain was excruciating and the pain is at the epigastric region shooting down into the back. Patient had to seek help to the emergency room for further evaluation. At the emergency room work-up with significant for a CT finding of acute pancreatitis with so much inflammation. It also was noted to have gallbladder full of stones patient does not drink does not do any illicit drugs. The risk factor here for this patient is medication and another is gallstones with a high index of suspicion is gallstone pancreatitis, however LFTs are within normal this could not be ruled in at this particular point is stone that had caused the problem and then passed on. Patient made n.p.o. for ultrasound in the morning and also for issues with acute pancreatitis. Ultrasound if patient is able to eat and tolerate that would be okay.   She has been optimized with empiric antibiotics because of an associated white count of 19,000 also urinary tract infection much inflammation of the pancreatic IV hydration follows pain management    PAST MEDICAL HISTORY:  Past Medical History:   Diagnosis Date    Metastatic renal cell carcinoma ([address]     Nasopharyngeal mass     Metastatic renal cell carcinoma, clear cell         PAST SURGICAL HISTORY:  Past Surgical History:   Procedure Laterality Date    ADRENALECTOMY Left 10/2017    NOSE SURGERY  12/2017    Renal cell carcinoma, metastatic clear cell, April 2016, December 2017    PARTIAL NEPHRECTOMY Left 10/2017    t3a n0 m0        SOCIAL HISTORY:  Social History     Socioeconomic History    Marital status:      Spouse name: None    Number of children: None    Years of education: None    Highest education level: None   Occupational History    None   Tobacco Use    Smoking status: Never Smoker    Smokeless tobacco: Never Used   Vaping Use    Vaping Use: Never used   Substance and Sexual Activity    Alcohol use: No    Drug use: No    Sexual activity: None   Other Topics Concern    None   Social History Narrative    None     Social Determinants of Health     Financial Resource Strain:     Difficulty of Paying Living Expenses: Not on file   Food Insecurity:     Worried About Running Out of Food in the Last Year: Not on file    Fabio of Food in the Last Year: Not on file   Transportation Needs:     Lack of Transportation (Medical): Not on file    Lack of Transportation (Non-Medical):  Not on file   Physical Activity:     Days of Exercise per Week: Not on file    Minutes of Exercise per Session: Not on file   Stress:     Feeling of Stress : Not on file   Social Connections:     Frequency of Communication with Friends and Family: Not on file    Frequency of Social Gatherings with Friends and Family: Not on file    Attends [employer] Services: Not on file    Active Member of [address] [place] [address] or Organizations: Not on file    Attends Club or Organization Meetings: Not on file    Marital Status: Not on file   Intimate Partner Violence:     Fear of Current or Ex-Partner: Not on file    Emotionally Abused: Not on file    Physically Abused: Not on file    Sexually Abused: Not on file   Housing Stability:     Unable to Pay for Housing in the Last Year: Not on file    Number of [place] in the Last Year: Not on file    Unstable Housing in the Last Year: Not on file        FAMILY HISTORY:  Family History   Problem Relation Age of Onset    Breast Cancer Mother     Breast Cancer Sister     Cancer Brother          ALLERGIES:  Allergies   Allergen Reactions    Nexium [Esomeprazole Magnesium] Rash    Prilosec [Omeprazole] Rash        PRIOR TO ADMISSION MEDS:  Medications Prior to Admission: Multiple Vitamins-Minerals (THERAPEUTIC MULTIVITAMIN-MINERALS) tablet, Take 1 tablet by mouth daily  vitamin E 1000 units capsule, Take 1,000 Units by mouth daily  vitamin C (ASCORBIC ACID) 500 MG tablet, Take 500 mg by mouth daily  niacin 100 MG tablet, Take 100 mg by mouth daily (with breakfast)  famotidine (PEPCID) 20 MG tablet, Take 1 tablet by mouth daily  cetirizine (ZYRTEC) 10 MG tablet, Take 10 mg by mouth  tamsulosin (FLOMAX) 0.4 MG capsule, Take 1 capsule by mouth every other day      REVIEW OF SYSTEMS:  Constitutional:  No fevers, chills, nausea, vomiting, + tiredness & fatigue   Head:  No head injury, facial trauma   Eyes:  No acute visual changes, exudate, trauma   Ears:  No acute hearing loss, earaches   Nose: No nasal discharge, epistaxis   Neck: No new hoarseness, voice change, or new masses   Lungs:   No hemoptysis, pleurisy   Heart:  No chest pressure with exertion, palpitations,    Abdomen:   No new masses, no bright red blood per rectum   Extremities: No acute pain while ambulating, no new lesions   Skin: No new changes in skin color, no rashes or lesions   Neurologic: No new motor or sensory changes     14 point review of systems addressed with patient which is essentially negative except as specifically addressed above:    PHYSICAL EXAM:  BP (!) 151/77   Pulse 86   Temp 97 °F (36.1 °C) (Temporal)   Resp 20   Wt 210 lb 14.4 oz (95.7 kg)   SpO2 94%   BMI 27.82 kg/m²   I/O this shift:   In: 48 [IV Piggyback:50]  Out: 100 [Urine:100]      PHYSICAL EXAMINATION:    Vital Signs: Please see the chart   ALLIE:  Awake, alert, oriented x 3, patient appears tired and fatigued   Head/Eyes:  Normocephalic, atraumatic, EOMI and PERRLA bilaterally   ENT: Moist mucous membranes, nasal passages clear   Neck: Supple, full range of motion, no carotid bruit, trachea midline   Respiratory:   Bilateral fair air entry in both lung fields, mild B/L crackles, symmetric expansion of chest   Cardiovascular:  Regular rate and rhythm, S1+S2+0, no murmurs/rubs   Urology: No bilateral CVA tenderness, no suprapubic tenderness   Abdomen:   Soft, non-tender, bowel sounds +ve, no organomegaly   Muscle/Joints: Moves all, full range of motion, no muscle spasms   Extremities: No clubbing, no cyanosis, no calf tenderness, no edema   Pulses: 2+ bilaterally, symmetrical   Skin: Warm, dry, no pallor/cyanosis/jaundice, no rashes/lesions   Neurologic: Awake, alert, oriented x 3, cranial nerves II-XII intact, no focal neurological deficits, sensory system intact   Psychiatric: Normal mood, non-suicidal         LABORATORY DATA:    CBC:  Recent Labs     02/28/22 1933   WBC 19.3*   HGB 15.5   HCT 48.8        BMP:  Recent Labs     02/28/22 1933 03/01/22  0103    142   K 4.3 4.4    109   CO2 22 18*   BUN 25* 23   CREATININE 0.9 0.8   CALCIUM 8.8 8.3*     Recent Labs     02/28/22 1933 03/01/22  0103   AST 18 18   ALT 13 13   BILITOT 0.6 0.6   ALKPHOS 87 81     Coag Panel: No results for input(s): INR, PROTIME, APTT in the last 72 hours. Cardiac Enzymes:   Recent Labs     02/28/22 1933   TROPONINI <0.01     ABGs:No results found for: PHART, PO2ART, NVE1AEH  Urinalysis:  Lab Results   Component Value Date    NITRU Negative 02/28/2022    WBCUA 7 02/28/2022    BACTERIA NEGATIVE 02/28/2022    RBCUA 0 02/28/2022    BLOODU Negative 02/28/2022    SPECGRAV >=1.045 02/28/2022    GLUCOSEU Negative 02/28/2022     A1C: No results for input(s): LABA1C in the last 72 hours. ABG:No results for input(s): PHART, GBK2LTQ, PO2ART, WZL7DSP, BEART, HGBAE, W7MUAQSS, CARBOXHGBART in the last 72 hours. EKG:   Please see chart      IMAGING:  CT ABDOMEN PELVIS W IV CONTRAST Additional Contrast? None    Result Date: 2/28/2022  1.  Rather extensive pancreatitis with extensive phlegmonous inflammatory changes surrounding the pancreatic gland. There is also inflammation within the marvel hepatis and associated with the gallbladder. A gallstone is identified within the gallbladder lumen. There is free fluid within the pelvis. Inflammatory change extends inferiorly from the pancreatic gland within the anterior pararenal space along the anterior renal fascia and right paracolic gutter. 2. No evidence of pancreatic necrosis. There is mild constipation with increased stool within the right and transverse colon. There is no obstruction or free air. The appendix is normal in appearance. 3. Bibasilar atelectasis. 4. Mild steatosis of the liver. 5. Cortical cysts of the kidneys. No evidence of nephrolithiasis or obstructive uropathy. 6. Prostatic enlargement. Mild prominence of the urinary bladder wall. . Signed by Dr Priyanka Muhammad Problem:    Pancreatitis, gallstone  Active Problems:      Cholelithiasis    Dehydration    Urinary tract infection    Abdominal pain           Plan:     Acute pancreatitis with gallstone  -Admit to general medical floor  -Gallstone could be a culprit but there is no much signs of gallstone pancreatitis  -Initiated  IV hydration and pain management Toradol and as needed Percocet  -Is n.p.o. after midnight for ultrasound of the right upper quadrant to further evaluate  biliary    trees  -Continue to optimize care   -Pancultured from blood and urine empiric antibiotics initiated with Zosyn          Leukocytosis  of 19,000   -Pancultured initiation of empiric antibiotics with Zosyn   -IV hydration  -Follow cultures and optimize      Urinary tract infection  -Patient with mild pyuria  -Continue IV hydration antibiotics optimize care       GI and DVT prophylaxis in place       Disposition-attempt to return home when medically stable hopefully in 3 days  Follow-up up ultrasound result      Attestation:  Inpatient status is used for patients with an expected LOS extending past two midnights due to medical therapy and/or critical care needs  . .. all other patients are placed under OBServation status. Yuko Phillips DO  2:44 AM 3/1/2022      DISCLAIMER: This note was created with electronic voice recognition which does have occasional errors. If you have any questions regarding the content within the note please do not hesitate to contact me. .. Thanks.

## 2022-03-01 NOTE — PROGRESS NOTES
Toradol given to pt. He is sweating, rolling around in the bed, in obvious pain and distress. Pt states, \"The pain is getting severe and I don't know how much longer I can stand this. \" He also stated his pain increased after taking the percocet with water. I notified Dr. Vish Jameson that toradol was given, he is in obvious pain and increased pain after the percocet.  Electronically signed by Ed Sauceda RN on 3/1/2022 at 2:51 AM

## 2022-03-01 NOTE — PROGRESS NOTES
Daily Progress Note    IWBW:2/3/2314  Patient: Twin Jessica  : 1950  EM:[id number]  CODE:Full Code No additional code details  Valorie Wyatt MD    Admit Date: 2022  7:24 PM   LOS: 1 day       Subjective:    Events noted. Had severe pain overnight, however improved some with IV analgesic. No fevers or chills overnight. No significant change in abdominal pain. Summary: 79-year-old male with metastatic renal cell carcinoma followed at [facility], presented with severe epigastric and right upper quadrant pain progressively worsening over the past few days with radiation to the back, with lipase over 3000 and CT abdomen findings consistent with acute pancreatitis, also noted to have cholelithiasis with pericholecystic inflammation present concerning for cholecystitis. Findings concerning for gallstone pancreatitis. Patient maintained on IV fluids with pain control. General surgery consulted to evaluate for potential cholecystectomy.       Review of Systems    Comprehensive ROS completed and is negative except as otherwise noted        Objective:      Vital signs in last 24 hours:  Patient Vitals for the past 24 hrs:   BP Temp Temp src Pulse Resp SpO2 Weight   22 0815 (!) 155/70 98.6 °F (37 °C) Temporal 96 18 95 % --   22 0017 (!) 151/77 97 °F (36.1 °C) Temporal 86 -- 94 % 210 lb 14.4 oz (95.7 kg)   22 2236 (!) 151/77 -- -- 78 20 95 % --   22 1930 (!) 163/73 97.8 °F (36.6 °C) -- 84 17 96 % 205 lb (93 kg)     Physical exam    General: Resting in no acute distress  Psych: Normal mood and affect  HEENT: NC/AT, no scleral icterus  Cardiovascular: Regular rhythm, pulses 2+ and equal  Respiratory: Clear to auscultation bilaterally, no wheezes  Abdomen: Epigastric and right upper quadrant tenderness noted, bowel sounds present  Neurologic: Nonfocal exam, normal speech  Extremities: No clubbing cyanosis or edema  Musculoskeletal: No deformities appreciated  Skin: Warm and dry, non-jaundiced    Lab Review   Recent Results (from the past 24 hour(s))   CBC with Auto Differential    Collection Time: 02/28/22  7:33 PM   Result Value Ref Range    WBC 19.3 (H) 4.8 - 10.8 K/uL    RBC 5.14 4.70 - 6.10 M/uL    Hemoglobin 15.5 14.0 - 18.0 g/dL    Hematocrit 48.8 42.0 - 52.0 %    MCV 94.9 (H) 80.0 - 94.0 fL    MCH 30.2 27.0 - 31.0 pg    MCHC 31.8 (L) 33.0 - 37.0 g/dL    RDW 14.0 11.5 - 14.5 %    Platelets 505 145 - 324 K/uL    MPV 10.3 9.4 - 12.4 fL    Neutrophils % 81.0 (H) 50.0 - 65.0 %    Lymphocytes % 12.8 (L) 20.0 - 40.0 %    Monocytes % 5.2 0.0 - 10.0 %    Eosinophils % 0.2 0.0 - 5.0 %    Basophils % 0.3 0.0 - 1.0 %    Neutrophils Absolute 15.6 (H) 1.5 - 7.5 K/uL    Immature Granulocytes # 0.1 K/uL    Lymphocytes Absolute 2.5 1.1 - 4.5 K/uL    Monocytes Absolute 1.00 (H) 0.00 - 0.90 K/uL    Eosinophils Absolute 0.00 0.00 - 0.60 K/uL    Basophils Absolute 0.10 0.00 - 0.20 K/uL   Comprehensive Metabolic Panel w/ Reflex to MG    Collection Time: 02/28/22  7:33 PM   Result Value Ref Range    Sodium 139 136 - 145 mmol/L    Potassium reflex Magnesium 4.3 3.5 - 5.0 mmol/L    Chloride 102 98 - 111 mmol/L    CO2 22 22 - 29 mmol/L    Anion Gap 15 7 - 19 mmol/L    Glucose 141 (H) 74 - 109 mg/dL    BUN 25 (H) 8 - 23 mg/dL    CREATININE 0.9 0.5 - 1.2 mg/dL    GFR Non-African American >60 >60    GFR African American >59 >59    Calcium 8.8 8.8 - 10.2 mg/dL    Total Protein 7.0 6.6 - 8.7 g/dL    Albumin 4.4 3.5 - 5.2 g/dL    Total Bilirubin 0.6 0.2 - 1.2 mg/dL    Alkaline Phosphatase 87 40 - 130 U/L    ALT 13 5 - 41 U/L    AST 18 5 - 40 U/L   Lipase    Collection Time: 02/28/22  7:33 PM   Result Value Ref Range    Lipase >3,000 (H) 13 - 60 U/L   Troponin    Collection Time: 02/28/22  7:33 PM   Result Value Ref Range    Troponin <0.01 0.00 - 0.03 ng/mL   Lipase    Collection Time: 02/28/22  7:33 PM   Result Value Ref Range    Lipase >3,000 (H) 13 - 60 U/L   EKG 12 Lead    Collection Time: 02/28/22  8:31 PM Result Value Ref Range    P-R Interval 180 ms    QRS Duration 92 ms    Q-T Interval 366 ms    QTc Calculation (Bazett) 406 ms    P Axis 56 degrees    T Axis 48 degrees   Urinalysis with Microscopic    Collection Time: 02/28/22 10:02 PM   Result Value Ref Range    Color, UA YELLOW Straw/Yellow    Clarity, UA Clear Clear    Glucose, Ur Negative Negative mg/dL    Bilirubin Urine Negative Negative    Ketones, Urine 80 (A) Negative mg/dL    Specific Gravity, UA >=1.045 1.005 - 1.030    Blood, Urine Negative Negative    pH, UA 5.0 5.0 - 8.0    Protein, UA Negative Negative mg/dL    Urobilinogen, Urine 0.2 <2.0 E.U./dL    Nitrite, Urine Negative Negative    Leukocyte Esterase, Urine TRACE (A) Negative    Bacteria, UA NEGATIVE (A) None Seen /HPF    Crystals, UA NEG (A) None Seen /HPF    Hyaline Casts, UA 3 0 - 8 /HPF    WBC, UA 7 (H) 0 - 5 /HPF    RBC, UA 0 0 - 4 /HPF    Epithelial Cells, UA 0 0 - 5 /HPF   Culture, Urine    Collection Time: 02/28/22 10:02 PM    Specimen: Urine, clean catch   Result Value Ref Range    Urine Culture, Routine No growth    COVID-19, Rapid    Collection Time: 02/28/22 10:24 PM    Specimen: Nasopharyngeal Swab   Result Value Ref Range    SARS-CoV-2, NAAT Not Detected Not Detected   Comprehensive Metabolic Panel w/ Reflex to MG    Collection Time: 03/01/22  1:03 AM   Result Value Ref Range    Sodium 142 136 - 145 mmol/L    Potassium reflex Magnesium 4.4 3.5 - 5.0 mmol/L    Chloride 109 98 - 111 mmol/L    CO2 18 (L) 22 - 29 mmol/L    Anion Gap 15 7 - 19 mmol/L    Glucose 164 (H) 74 - 109 mg/dL    BUN 23 8 - 23 mg/dL    CREATININE 0.8 0.5 - 1.2 mg/dL    GFR Non-African American >60 >60    GFR African American >59 >59    Calcium 8.3 (L) 8.8 - 10.2 mg/dL    Total Protein 6.1 (L) 6.6 - 8.7 g/dL    Albumin 4.3 3.5 - 5.2 g/dL    Total Bilirubin 0.6 0.2 - 1.2 mg/dL    Alkaline Phosphatase 81 40 - 130 U/L    ALT 13 5 - 41 U/L    AST 18 5 - 40 U/L   Lactic Acid    Collection Time: 03/01/22  1:03 AM Result Value Ref Range    Lactic Acid 1.7 0.5 - 1.9 mmol/L   Triglyceride    Collection Time: 03/01/22 10:55 AM   Result Value Ref Range    Triglycerides 49 0 - 149 mg/dL       I/O last 3 completed shifts:   In: 48 [IV Piggyback:50]  Out: 300 [Urine:300]  I/O this shift:  In: -   Out: 250 [Urine:250]      Current Facility-Administered Medications:     oxyCODONE-acetaminophen (PERCOCET)  MG per tablet 1 tablet, 1 tablet, Oral, Q4H PRN, Patience Abbe, DO, 1 tablet at 03/01/22 0438    lactated ringers infusion, , IntraVENous, Continuous, Junior Bronson MD, Last Rate: 250 mL/hr at 03/01/22 0825, New Bag at 03/01/22 0825    lactated ringers infusion, , IntraVENous, Continuous, Junior Bronson MD    HYDROmorphone HCl PF (DILAUDID) injection 0.5 mg, 0.5 mg, IntraVENous, Q4H PRN, Junior Bronson MD, 0.5 mg at 03/01/22 1205    tamsulosin (FLOMAX) capsule 0.4 mg, 0.4 mg, Oral, Every Other Day, Junior Bronson MD, 0.4 mg at 03/01/22 1155    [Held by provider] famotidine (PEPCID) tablet 20 mg, 20 mg, Oral, Daily, Junior Bronson MD    cetirizine (ZYRTEC) tablet 10 mg, 10 mg, Oral, Daily, Junior Bronson MD    famotidine (PEPCID) injection 20 mg, 20 mg, IntraVENous, BID, Junior Bronson MD, 20 mg at 03/01/22 1201    sodium chloride flush 0.9 % injection 5-40 mL, 5-40 mL, IntraVENous, 2 times per day, Patience Abbe, DO, 10 mL at 03/01/22 0830    sodium chloride flush 0.9 % injection 5-40 mL, 5-40 mL, IntraVENous, PRN, Patience Abbe, DO    enoxaparin (LOVENOX) injection 40 mg, 40 mg, SubCUTAneous, Daily, Patience Abbe, DO, 40 mg at 03/01/22 0827    ondansetron (ZOFRAN-ODT) disintegrating tablet 4 mg, 4 mg, Oral, Q8H PRN **OR** ondansetron (ZOFRAN) injection 4 mg, 4 mg, IntraVENous, Q6H PRN, Patience Abbe, DO, 4 mg at 03/01/22 0049    polyethylene glycol (GLYCOLAX) packet 17 g, 17 g, Oral, Daily PRN, Patience Abbe, DO    acetaminophen (TYLENOL) tablet 650 mg, 650 mg, Oral, Q6H PRN **OR** acetaminophen (TYLENOL) suppository 650 mg, 650 mg, Rectal, Q6H PRN, Patience Abbe, DO    piperacillin-tazobactam (ZOSYN) 3,375 mg in dextrose 5 % 50 mL IVPB extended infusion (mini-bag), 3,375 mg, IntraVENous, Q8H, Mulu Baptiste MD, Stopped at 03/01/22 3250        Assessment/plan  Principal Problem:    Pancreatitis, gallstone  Active Problems:    Acute pancreatitis    Cholelithiasis    Dehydration    Abdominal pain  Resolved Problems:    * No resolved hospital problems.  *      Acute pancreatitis, suspect gallstone pancreatitis with cholelithiasis and pericholecystic inflammation noted on imaging  CT abdomen and gallbladder ultrasound reviewed  General surgery consult, consideration for cholecystectomy at some point  Continue aggressive IV fluid resuscitation  Pain control   N.p.o. except sips of meds  Check triglycerides    Leukocytosis likely reactive, no evidence of pancreatic necrosis or other clear infectious source  Observe off antibiotics after today     DVT prophylaxis with Lovenox    Mulu Baptiste MD 3/1/2022 12:36 PM

## 2022-03-01 NOTE — PROGRESS NOTES
Pt called out, abdominal pain is increased tremendously. He states the percocet did not help at all. He is sitting on the edge of the bed in obvious pain. Dr. Victor M Foy contacted. Electronically signed by Kristie Denney RN on 3/1/2022 at 1:55 AM    0154- Dr. Victor M Foy stated she will place additional orders for pain medication.

## 2022-03-01 NOTE — PROGRESS NOTES
Pt c/o abdominal pain 8/10, requesting pain meds.  Secure message sent to Dr. Ryan Dougherty, awaiting response Electronically signed by Viviana Li RN on 3/1/2022 at 12:31 AM

## 2022-03-02 LAB
ALBUMIN SERPL-MCNC: 3.6 G/DL (ref 3.5–5.2)
ALP BLD-CCNC: 67 U/L (ref 40–130)
ALT SERPL-CCNC: 16 U/L (ref 5–41)
ANION GAP SERPL CALCULATED.3IONS-SCNC: 12 MMOL/L (ref 7–19)
AST SERPL-CCNC: 31 U/L (ref 5–40)
BASOPHILS ABSOLUTE: 0 K/UL (ref 0–0.2)
BASOPHILS RELATIVE PERCENT: 0.1 % (ref 0–1)
BILIRUB SERPL-MCNC: 1.1 MG/DL (ref 0.2–1.2)
BUN BLDV-MCNC: 27 MG/DL (ref 8–23)
CALCIUM SERPL-MCNC: 7.6 MG/DL (ref 8.8–10.2)
CHLORIDE BLD-SCNC: 104 MMOL/L (ref 98–111)
CO2: 21 MMOL/L (ref 22–29)
CREAT SERPL-MCNC: 0.9 MG/DL (ref 0.5–1.2)
EOSINOPHILS ABSOLUTE: 0 K/UL (ref 0–0.6)
EOSINOPHILS RELATIVE PERCENT: 0 % (ref 0–5)
GFR AFRICAN AMERICAN: >59
GFR NON-AFRICAN AMERICAN: >60
GLUCOSE BLD-MCNC: 133 MG/DL (ref 74–109)
HCT VFR BLD CALC: 44.9 % (ref 42–52)
HEMOGLOBIN: 14.4 G/DL (ref 14–18)
IMMATURE GRANULOCYTES #: 0.2 K/UL
LIPASE: 1535 U/L (ref 13–60)
LYMPHOCYTES ABSOLUTE: 1.5 K/UL (ref 1.1–4.5)
LYMPHOCYTES RELATIVE PERCENT: 6.8 % (ref 20–40)
MCH RBC QN AUTO: 30.3 PG (ref 27–31)
MCHC RBC AUTO-ENTMCNC: 32.1 G/DL (ref 33–37)
MCV RBC AUTO: 94.5 FL (ref 80–94)
MONOCYTES ABSOLUTE: 1.1 K/UL (ref 0–0.9)
MONOCYTES RELATIVE PERCENT: 4.8 % (ref 0–10)
NEUTROPHILS ABSOLUTE: 19.3 K/UL (ref 1.5–7.5)
NEUTROPHILS RELATIVE PERCENT: 87.6 % (ref 50–65)
PDW BLD-RTO: 14.6 % (ref 11.5–14.5)
PLATELET # BLD: 125 K/UL (ref 130–400)
PMV BLD AUTO: 10.5 FL (ref 9.4–12.4)
POTASSIUM REFLEX MAGNESIUM: 4.3 MMOL/L (ref 3.5–5)
RBC # BLD: 4.75 M/UL (ref 4.7–6.1)
SODIUM BLD-SCNC: 137 MMOL/L (ref 136–145)
TOTAL PROTEIN: 5.4 G/DL (ref 6.6–8.7)
URINE CULTURE, ROUTINE: NORMAL
WBC # BLD: 22 K/UL (ref 4.8–10.8)

## 2022-03-02 PROCEDURE — 6360000002 HC RX W HCPCS: Performed by: INTERNAL MEDICINE

## 2022-03-02 PROCEDURE — 6370000000 HC RX 637 (ALT 250 FOR IP): Performed by: STUDENT IN AN ORGANIZED HEALTH CARE EDUCATION/TRAINING PROGRAM

## 2022-03-02 PROCEDURE — 2500000003 HC RX 250 WO HCPCS: Performed by: STUDENT IN AN ORGANIZED HEALTH CARE EDUCATION/TRAINING PROGRAM

## 2022-03-02 PROCEDURE — 83690 ASSAY OF LIPASE: CPT

## 2022-03-02 PROCEDURE — 36415 COLL VENOUS BLD VENIPUNCTURE: CPT

## 2022-03-02 PROCEDURE — 6370000000 HC RX 637 (ALT 250 FOR IP): Performed by: INTERNAL MEDICINE

## 2022-03-02 PROCEDURE — 80053 COMPREHEN METABOLIC PANEL: CPT

## 2022-03-02 PROCEDURE — 2580000003 HC RX 258: Performed by: INTERNAL MEDICINE

## 2022-03-02 PROCEDURE — 85025 COMPLETE CBC W/AUTO DIFF WBC: CPT

## 2022-03-02 PROCEDURE — 6360000002 HC RX W HCPCS: Performed by: STUDENT IN AN ORGANIZED HEALTH CARE EDUCATION/TRAINING PROGRAM

## 2022-03-02 PROCEDURE — 1210000000 HC MED SURG R&B

## 2022-03-02 RX ORDER — DOCUSATE SODIUM 100 MG/1
100 CAPSULE, LIQUID FILLED ORAL DAILY
Status: DISCONTINUED | OUTPATIENT
Start: 2022-03-02 | End: 2022-03-12 | Stop reason: HOSPADM

## 2022-03-02 RX ORDER — FUROSEMIDE 10 MG/ML
20 INJECTION INTRAMUSCULAR; INTRAVENOUS ONCE
Status: COMPLETED | OUTPATIENT
Start: 2022-03-02 | End: 2022-03-02

## 2022-03-02 RX ORDER — POLYETHYLENE GLYCOL 3350 17 G/17G
17 POWDER, FOR SOLUTION ORAL DAILY
Status: DISCONTINUED | OUTPATIENT
Start: 2022-03-02 | End: 2022-03-11

## 2022-03-02 RX ORDER — TAMSULOSIN HYDROCHLORIDE 0.4 MG/1
0.4 CAPSULE ORAL DAILY
Status: DISCONTINUED | OUTPATIENT
Start: 2022-03-02 | End: 2022-03-12 | Stop reason: HOSPADM

## 2022-03-02 RX ADMIN — HYDROMORPHONE HYDROCHLORIDE 0.5 MG: 1 INJECTION, SOLUTION INTRAMUSCULAR; INTRAVENOUS; SUBCUTANEOUS at 11:40

## 2022-03-02 RX ADMIN — HYDROMORPHONE HYDROCHLORIDE 0.5 MG: 1 INJECTION, SOLUTION INTRAMUSCULAR; INTRAVENOUS; SUBCUTANEOUS at 15:51

## 2022-03-02 RX ADMIN — PIPERACILLIN AND TAZOBACTAM 3375 MG: 3; .375 INJECTION, POWDER, LYOPHILIZED, FOR SOLUTION INTRAVENOUS at 10:11

## 2022-03-02 RX ADMIN — FAMOTIDINE 20 MG: 10 INJECTION, SOLUTION INTRAVENOUS at 20:09

## 2022-03-02 RX ADMIN — FAMOTIDINE 20 MG: 10 INJECTION, SOLUTION INTRAVENOUS at 07:50

## 2022-03-02 RX ADMIN — SODIUM CHLORIDE, PRESERVATIVE FREE 10 ML: 5 INJECTION INTRAVENOUS at 07:50

## 2022-03-02 RX ADMIN — DOCUSATE SODIUM 100 MG: 100 CAPSULE, LIQUID FILLED ORAL at 10:07

## 2022-03-02 RX ADMIN — HYDROMORPHONE HYDROCHLORIDE 0.5 MG: 1 INJECTION, SOLUTION INTRAMUSCULAR; INTRAVENOUS; SUBCUTANEOUS at 00:55

## 2022-03-02 RX ADMIN — PIPERACILLIN AND TAZOBACTAM 3375 MG: 3; .375 INJECTION, POWDER, LYOPHILIZED, FOR SOLUTION INTRAVENOUS at 18:08

## 2022-03-02 RX ADMIN — HYDROMORPHONE HYDROCHLORIDE 0.5 MG: 1 INJECTION, SOLUTION INTRAMUSCULAR; INTRAVENOUS; SUBCUTANEOUS at 06:40

## 2022-03-02 RX ADMIN — TAMSULOSIN HYDROCHLORIDE 0.4 MG: 0.4 CAPSULE ORAL at 10:07

## 2022-03-02 RX ADMIN — CETIRIZINE HYDROCHLORIDE 10 MG: 10 TABLET, FILM COATED ORAL at 07:49

## 2022-03-02 RX ADMIN — POLYETHYLENE GLYCOL 3350 17 G: 17 POWDER, FOR SOLUTION ORAL at 10:07

## 2022-03-02 RX ADMIN — FUROSEMIDE 20 MG: 10 INJECTION, SOLUTION INTRAMUSCULAR; INTRAVENOUS at 10:07

## 2022-03-02 RX ADMIN — HYDROMORPHONE HYDROCHLORIDE 0.5 MG: 1 INJECTION, SOLUTION INTRAMUSCULAR; INTRAVENOUS; SUBCUTANEOUS at 20:09

## 2022-03-02 RX ADMIN — ENOXAPARIN SODIUM 40 MG: 100 INJECTION SUBCUTANEOUS at 07:49

## 2022-03-02 ASSESSMENT — PAIN SCALES - GENERAL
PAINLEVEL_OUTOF10: 8
PAINLEVEL_OUTOF10: 8
PAINLEVEL_OUTOF10: 2
PAINLEVEL_OUTOF10: 7
PAINLEVEL_OUTOF10: 9
PAINLEVEL_OUTOF10: 4
PAINLEVEL_OUTOF10: 2
PAINLEVEL_OUTOF10: 3

## 2022-03-02 NOTE — PROGRESS NOTES
Problems:    Acute pancreatitis    Cholelithiasis    Dehydration    Abdominal pain  Resolved Problems:    * No resolved hospital problems. *    Blood pressure 137/66, pulse 121, temperature 99.1 °F (37.3 °C), temperature source Temporal, resp. rate 18, weight 210 lb 14.4 oz (95.7 kg), SpO2 93 %. Subjective:  Symptoms:  (Still in quite a bit of pain  No bowel function). Diet:  NPO. Activity level: Impaired due to pain. Pain:  He complains of pain that is severe. Pain is partially controlled. Objective:  General Appearance: In pain and ill-appearing. Vital signs: (most recent): Blood pressure 137/66, pulse 121, temperature 99.1 °F (37.3 °C), temperature source Temporal, resp. rate 18, weight 210 lb 14.4 oz (95.7 kg), SpO2 93 %. (Tachycardic and low grade temp elevation).      Abdomen distended and quite tender  Assessment & Plan  Acute pancreatitis, lipase is decreasing  Continue pain management, IVF  Graeme Manzo MD  3/2/2022

## 2022-03-02 NOTE — PROGRESS NOTES
Daily Progress Note    Date:3/2/2022  Patient: Kingsley Albert  : 1950  Joby Joe MD    Admit Date: 2022  7:24 PM   LOS: 2 days       Subjective: Patient reports some abdominal discomfort but overall feels better. He states he uses Flomax as per his urologist recommendations. He had an episode of shortness of breath requiring oxygen by nasal cannula overnight. No other new complaints reported. Summary: 72-year-old male with metastatic renal cell carcinoma followed at [facility], presented with severe epigastric and right upper quadrant pain progressively worsening over the past few days with radiation to the back, with lipase over 3000 and CT abdomen findings consistent with acute pancreatitis, also noted to have cholelithiasis with pericholecystic inflammation present concerning for cholecystitis. Findings concerning for gallstone pancreatitis. Patient maintained on IV fluids with pain control. General surgery consulted to evaluate for potential cholecystectomy. Review of Systems    Comprehensive ROS completed and is negative except as otherwise noted        Objective:      Vital signs in last 24 hours:  Patient Vitals for the past 24 hrs:   BP Temp Temp src Pulse Resp SpO2   22 0727 -- -- -- -- -- 93 %   22 0724 137/66 99.1 °F (37.3 °C) Temporal 121 18 (!) 86 %   22 0503 (!) 144/77 100.2 °F (37.9 °C) Temporal 115 16 91 %   22 2055 133/70 98.6 °F (37 °C) Temporal 104 16 91 %   22 1622 129/66 98.4 °F (36.9 °C) Temporal 102 18 91 %     Physical exam    General: Resting in no acute distress  Psych: Normal mood and affect  HEENT: NC/AT, no scleral icterus  Cardiovascular: Regular rhythm, pulses 2+ and equal  Respiratory: Clear to auscultation bilaterally, no wheezes  Abdomen: Mild epigastric and right upper quadrant tenderness noted, bowel sounds present in all 4 quadrants.   Neurologic: Nonfocal exam, normal speech  Extremities: No clubbing cyanosis or edema  Musculoskeletal: No deformities appreciated  Skin: Warm and dry, non-jaundiced    Lab Review   Recent Results (from the past 24 hour(s))   CBC with Auto Differential    Collection Time: 03/02/22  2:52 AM   Result Value Ref Range    WBC 22.0 (H) 4.8 - 10.8 K/uL    RBC 4.75 4.70 - 6.10 M/uL    Hemoglobin 14.4 14.0 - 18.0 g/dL    Hematocrit 44.9 42.0 - 52.0 %    MCV 94.5 (H) 80.0 - 94.0 fL    MCH 30.3 27.0 - 31.0 pg    MCHC 32.1 (L) 33.0 - 37.0 g/dL    RDW 14.6 (H) 11.5 - 14.5 %    Platelets 329 (L) 492 - 400 K/uL    MPV 10.5 9.4 - 12.4 fL    Neutrophils % 87.6 (H) 50.0 - 65.0 %    Lymphocytes % 6.8 (L) 20.0 - 40.0 %    Monocytes % 4.8 0.0 - 10.0 %    Eosinophils % 0.0 0.0 - 5.0 %    Basophils % 0.1 0.0 - 1.0 %    Neutrophils Absolute 19.3 (H) 1.5 - 7.5 K/uL    Immature Granulocytes # 0.2 K/uL    Lymphocytes Absolute 1.5 1.1 - 4.5 K/uL    Monocytes Absolute 1.10 (H) 0.00 - 0.90 K/uL    Eosinophils Absolute 0.00 0.00 - 0.60 K/uL    Basophils Absolute 0.00 0.00 - 0.20 K/uL   Comprehensive Metabolic Panel w/ Reflex to MG    Collection Time: 03/02/22  2:52 AM   Result Value Ref Range    Sodium 137 136 - 145 mmol/L    Potassium reflex Magnesium 4.3 3.5 - 5.0 mmol/L    Chloride 104 98 - 111 mmol/L    CO2 21 (L) 22 - 29 mmol/L    Anion Gap 12 7 - 19 mmol/L    Glucose 133 (H) 74 - 109 mg/dL    BUN 27 (H) 8 - 23 mg/dL    CREATININE 0.9 0.5 - 1.2 mg/dL    GFR Non-African American >60 >60    GFR African American >59 >59    Calcium 7.6 (L) 8.8 - 10.2 mg/dL    Total Protein 5.4 (L) 6.6 - 8.7 g/dL    Albumin 3.6 3.5 - 5.2 g/dL    Total Bilirubin 1.1 0.2 - 1.2 mg/dL    Alkaline Phosphatase 67 40 - 130 U/L    ALT 16 5 - 41 U/L    AST 31 5 - 40 U/L   Lipase    Collection Time: 03/02/22  2:52 AM   Result Value Ref Range    Lipase 1,535 (H) 13 - 60 U/L       I/O last 3 completed shifts: In: 48 [IV Piggyback:50]  Out: 775 [Urine:775]  No intake/output data recorded.       Current Facility-Administered Medications:     piperacillin-tazobactam (ZOSYN) 3,375 mg in dextrose 5 % 50 mL IVPB extended infusion (mini-bag), 3,375 mg, IntraVENous, Q8H, Adelaide Johnson MD, Last Rate: 12.5 mL/hr at 03/02/22 1011, 3,375 mg at 03/02/22 1011    tamsulosin (FLOMAX) capsule 0.4 mg, 0.4 mg, Oral, Daily, Adelaide Johnson MD, 0.4 mg at 03/02/22 1007    polyethylene glycol (GLYCOLAX) packet 17 g, 17 g, Oral, Daily, Adelaide Johnson MD, 17 g at 03/02/22 1007    docusate sodium (COLACE) capsule 100 mg, 100 mg, Oral, Daily, Adelaide Johnson MD, 100 mg at 03/02/22 1007    oxyCODONE-acetaminophen (PERCOCET)  MG per tablet 1 tablet, 1 tablet, Oral, Q4H PRN, Patience Abbe, DO, 1 tablet at 03/01/22 0438    lactated ringers infusion, , IntraVENous, Continuous, Fernando Pak MD, Last Rate: 150 mL/hr at 03/01/22 1634, New Bag at 03/01/22 1634    HYDROmorphone HCl PF (DILAUDID) injection 0.5 mg, 0.5 mg, IntraVENous, Q4H PRN, Fernando Pak MD, 0.5 mg at 03/02/22 1140    [Held by provider] famotidine (PEPCID) tablet 20 mg, 20 mg, Oral, Daily, Fernando Pak MD    cetirizine (ZYRTEC) tablet 10 mg, 10 mg, Oral, Daily, Fernando Pak MD, 10 mg at 03/02/22 0749    famotidine (PEPCID) injection 20 mg, 20 mg, IntraVENous, BID, Fernando Pak MD, 20 mg at 03/02/22 0750    sodium chloride flush 0.9 % injection 5-40 mL, 5-40 mL, IntraVENous, 2 times per day, Patience Abbe, DO, 10 mL at 03/02/22 0750    sodium chloride flush 0.9 % injection 5-40 mL, 5-40 mL, IntraVENous, PRN, Patience Abbe, DO    enoxaparin (LOVENOX) injection 40 mg, 40 mg, SubCUTAneous, Daily, Patience Abbe, DO, 40 mg at 03/02/22 0749    ondansetron (ZOFRAN-ODT) disintegrating tablet 4 mg, 4 mg, Oral, Q8H PRN **OR** ondansetron (ZOFRAN) injection 4 mg, 4 mg, IntraVENous, Q6H PRN, Patience Abbe, DO, 4 mg at 03/01/22 0049    polyethylene glycol (GLYCOLAX) packet 17 g, 17 g, Oral, Daily PRN, Patience Abbe, DO    acetaminophen (TYLENOL) tablet 650 mg, 650 mg, Oral, Q6H PRN **OR** acetaminophen (TYLENOL) suppository 650 mg, 650 mg, Rectal, Q6H PRN, Patience Abbe, DO        Assessment/plan  Principal Problem:    Pancreatitis, gallstone  Active Problems:    Acute pancreatitis    Cholelithiasis    Dehydration    Abdominal pain  Resolved Problems:    * No resolved hospital problems. *      Acute pancreatitis, suspect gallstone pancreatitis with cholelithiasis  Pericholecystic inflammation noted on imaging  CT abdomen and gallbladder ultrasound reviewed  General surgery consult follow-up and reevaluation appreciated. Plan for possible cholecystectomy when more stable. Continue IV fluid resuscitation, cautiously as patient seems to require oxygen likely due to fluid overload. Will decrease fluids from 150-100. Pain control   N.p.o. except sips of meds  Triglycerides 49    Leukocytosis  No evidence of pancreatic necrosis or other clear infectious source  Patient spiked a fever overnight. Will resume Zosyn in view of increasing leukocytosis. DVT prophylaxis with Lovenox    Plan of care to be adjusted pending response from treatment and further evaluation.     Jose Daniel Terrell MD 3/2/2022 12:06 PM

## 2022-03-03 ENCOUNTER — APPOINTMENT (OUTPATIENT)
Dept: GENERAL RADIOLOGY | Age: 72
DRG: 439 | End: 2022-03-03
Payer: MEDICARE

## 2022-03-03 ENCOUNTER — APPOINTMENT (OUTPATIENT)
Dept: CT IMAGING | Age: 72
DRG: 439 | End: 2022-03-03
Payer: MEDICARE

## 2022-03-03 LAB
ALBUMIN SERPL-MCNC: 3.4 G/DL (ref 3.5–5.2)
ALP BLD-CCNC: 68 U/L (ref 40–130)
ALT SERPL-CCNC: 16 U/L (ref 5–41)
ANION GAP SERPL CALCULATED.3IONS-SCNC: 14 MMOL/L (ref 7–19)
ANISOCYTOSIS: ABNORMAL
AST SERPL-CCNC: 28 U/L (ref 5–40)
ATYPICAL LYMPHOCYTE RELATIVE PERCENT: 5 % (ref 0–8)
BANDED NEUTROPHILS RELATIVE PERCENT: 7 % (ref 0–5)
BASOPHILS ABSOLUTE: 0 K/UL (ref 0–0.2)
BASOPHILS RELATIVE PERCENT: 0 % (ref 0–1)
BILIRUB SERPL-MCNC: 1.4 MG/DL (ref 0.2–1.2)
BUN BLDV-MCNC: 29 MG/DL (ref 8–23)
CALCIUM SERPL-MCNC: 7.6 MG/DL (ref 8.8–10.2)
CHLORIDE BLD-SCNC: 101 MMOL/L (ref 98–111)
CO2: 22 MMOL/L (ref 22–29)
CREAT SERPL-MCNC: 1 MG/DL (ref 0.5–1.2)
EOSINOPHILS ABSOLUTE: 0 K/UL (ref 0–0.6)
EOSINOPHILS RELATIVE PERCENT: 0 % (ref 0–5)
GFR AFRICAN AMERICAN: >59
GFR NON-AFRICAN AMERICAN: >60
GLUCOSE BLD-MCNC: 156 MG/DL (ref 74–109)
HCT VFR BLD CALC: 43.1 % (ref 42–52)
HEMOGLOBIN: 13.6 G/DL (ref 14–18)
HYPOCHROMIA: ABNORMAL
IMMATURE GRANULOCYTES #: 0.1 K/UL
LACTATE DEHYDROGENASE: 405 U/L (ref 91–215)
LIPASE: 267 U/L (ref 13–60)
LYMPHOCYTES ABSOLUTE: 1.3 K/UL (ref 1.1–4.5)
LYMPHOCYTES RELATIVE PERCENT: 4 % (ref 20–40)
MACROCYTES: ABNORMAL
MCH RBC QN AUTO: 30.1 PG (ref 27–31)
MCHC RBC AUTO-ENTMCNC: 31.6 G/DL (ref 33–37)
MCV RBC AUTO: 95.4 FL (ref 80–94)
MONOCYTES ABSOLUTE: 0.6 K/UL (ref 0–0.9)
MONOCYTES RELATIVE PERCENT: 4 % (ref 0–10)
NEUTROPHILS ABSOLUTE: 12.4 K/UL (ref 1.5–7.5)
NEUTROPHILS RELATIVE PERCENT: 80 % (ref 50–65)
PDW BLD-RTO: 14.6 % (ref 11.5–14.5)
PLATELET # BLD: 121 K/UL (ref 130–400)
PLATELET SLIDE REVIEW: ABNORMAL
PMV BLD AUTO: 10.8 FL (ref 9.4–12.4)
POTASSIUM REFLEX MAGNESIUM: 4.1 MMOL/L (ref 3.5–5)
RBC # BLD: 4.52 M/UL (ref 4.7–6.1)
SODIUM BLD-SCNC: 137 MMOL/L (ref 136–145)
TOTAL PROTEIN: 5.3 G/DL (ref 6.6–8.7)
WBC # BLD: 14.3 K/UL (ref 4.8–10.8)

## 2022-03-03 PROCEDURE — 74018 RADEX ABDOMEN 1 VIEW: CPT

## 2022-03-03 PROCEDURE — 1210000000 HC MED SURG R&B

## 2022-03-03 PROCEDURE — 6370000000 HC RX 637 (ALT 250 FOR IP): Performed by: INTERNAL MEDICINE

## 2022-03-03 PROCEDURE — 71045 X-RAY EXAM CHEST 1 VIEW: CPT

## 2022-03-03 PROCEDURE — 2580000003 HC RX 258: Performed by: INTERNAL MEDICINE

## 2022-03-03 PROCEDURE — 83615 LACTATE (LD) (LDH) ENZYME: CPT

## 2022-03-03 PROCEDURE — 99233 SBSQ HOSP IP/OBS HIGH 50: CPT | Performed by: SURGERY

## 2022-03-03 PROCEDURE — 6360000002 HC RX W HCPCS: Performed by: STUDENT IN AN ORGANIZED HEALTH CARE EDUCATION/TRAINING PROGRAM

## 2022-03-03 PROCEDURE — 6360000002 HC RX W HCPCS: Performed by: INTERNAL MEDICINE

## 2022-03-03 PROCEDURE — 6370000000 HC RX 637 (ALT 250 FOR IP): Performed by: STUDENT IN AN ORGANIZED HEALTH CARE EDUCATION/TRAINING PROGRAM

## 2022-03-03 PROCEDURE — 85025 COMPLETE CBC W/AUTO DIFF WBC: CPT

## 2022-03-03 PROCEDURE — 80053 COMPREHEN METABOLIC PANEL: CPT

## 2022-03-03 PROCEDURE — 74176 CT ABD & PELVIS W/O CONTRAST: CPT

## 2022-03-03 PROCEDURE — 2500000003 HC RX 250 WO HCPCS: Performed by: STUDENT IN AN ORGANIZED HEALTH CARE EDUCATION/TRAINING PROGRAM

## 2022-03-03 PROCEDURE — 83690 ASSAY OF LIPASE: CPT

## 2022-03-03 PROCEDURE — 36415 COLL VENOUS BLD VENIPUNCTURE: CPT

## 2022-03-03 RX ORDER — BISACODYL 10 MG
10 SUPPOSITORY, RECTAL RECTAL DAILY PRN
Status: DISCONTINUED | OUTPATIENT
Start: 2022-03-03 | End: 2022-03-12 | Stop reason: HOSPADM

## 2022-03-03 RX ADMIN — FAMOTIDINE 20 MG: 10 INJECTION, SOLUTION INTRAVENOUS at 21:12

## 2022-03-03 RX ADMIN — HYDROMORPHONE HYDROCHLORIDE 0.5 MG: 1 INJECTION, SOLUTION INTRAMUSCULAR; INTRAVENOUS; SUBCUTANEOUS at 08:18

## 2022-03-03 RX ADMIN — ENOXAPARIN SODIUM 40 MG: 100 INJECTION SUBCUTANEOUS at 08:09

## 2022-03-03 RX ADMIN — HYDROMORPHONE HYDROCHLORIDE 0.5 MG: 1 INJECTION, SOLUTION INTRAMUSCULAR; INTRAVENOUS; SUBCUTANEOUS at 03:57

## 2022-03-03 RX ADMIN — DOCUSATE SODIUM 100 MG: 100 CAPSULE, LIQUID FILLED ORAL at 08:09

## 2022-03-03 RX ADMIN — HYDROMORPHONE HYDROCHLORIDE 0.5 MG: 1 INJECTION, SOLUTION INTRAMUSCULAR; INTRAVENOUS; SUBCUTANEOUS at 23:44

## 2022-03-03 RX ADMIN — TAMSULOSIN HYDROCHLORIDE 0.4 MG: 0.4 CAPSULE ORAL at 08:09

## 2022-03-03 RX ADMIN — HYDROMORPHONE HYDROCHLORIDE 0.5 MG: 1 INJECTION, SOLUTION INTRAMUSCULAR; INTRAVENOUS; SUBCUTANEOUS at 15:45

## 2022-03-03 RX ADMIN — PIPERACILLIN AND TAZOBACTAM 3375 MG: 3; .375 INJECTION, POWDER, LYOPHILIZED, FOR SOLUTION INTRAVENOUS at 17:58

## 2022-03-03 RX ADMIN — HYDROMORPHONE HYDROCHLORIDE 0.5 MG: 1 INJECTION, SOLUTION INTRAMUSCULAR; INTRAVENOUS; SUBCUTANEOUS at 19:48

## 2022-03-03 RX ADMIN — POLYETHYLENE GLYCOL 3350 17 G: 17 POWDER, FOR SOLUTION ORAL at 08:09

## 2022-03-03 RX ADMIN — ONDANSETRON 4 MG: 2 INJECTION INTRAMUSCULAR; INTRAVENOUS at 11:52

## 2022-03-03 RX ADMIN — SODIUM CHLORIDE, PRESERVATIVE FREE 10 ML: 5 INJECTION INTRAVENOUS at 08:30

## 2022-03-03 RX ADMIN — HYDROMORPHONE HYDROCHLORIDE 0.5 MG: 1 INJECTION, SOLUTION INTRAMUSCULAR; INTRAVENOUS; SUBCUTANEOUS at 00:10

## 2022-03-03 RX ADMIN — PIPERACILLIN AND TAZOBACTAM 3375 MG: 3; .375 INJECTION, POWDER, LYOPHILIZED, FOR SOLUTION INTRAVENOUS at 08:20

## 2022-03-03 RX ADMIN — CETIRIZINE HYDROCHLORIDE 10 MG: 10 TABLET, FILM COATED ORAL at 08:09

## 2022-03-03 RX ADMIN — PIPERACILLIN AND TAZOBACTAM 3375 MG: 3; .375 INJECTION, POWDER, LYOPHILIZED, FOR SOLUTION INTRAVENOUS at 03:00

## 2022-03-03 RX ADMIN — FAMOTIDINE 20 MG: 10 INJECTION, SOLUTION INTRAVENOUS at 08:09

## 2022-03-03 RX ADMIN — HYDROMORPHONE HYDROCHLORIDE 0.5 MG: 1 INJECTION, SOLUTION INTRAMUSCULAR; INTRAVENOUS; SUBCUTANEOUS at 11:52

## 2022-03-03 ASSESSMENT — PAIN SCALES - GENERAL
PAINLEVEL_OUTOF10: 2
PAINLEVEL_OUTOF10: 9
PAINLEVEL_OUTOF10: 8
PAINLEVEL_OUTOF10: 9
PAINLEVEL_OUTOF10: 6
PAINLEVEL_OUTOF10: 6
PAINLEVEL_OUTOF10: 9
PAINLEVEL_OUTOF10: 2
PAINLEVEL_OUTOF10: 8

## 2022-03-03 NOTE — PROGRESS NOTES
Physician Progress Note      PATIENT:               Greg Hernandez  CSN #:                  [id number]  :                       1950  ADMIT DATE:       2022 7:24 PM  DISCH DATE:  RESPONDING  PROVIDER #:        MICKEY Patle Cedric          QUERY TEXT:    Patient admitted with acute pancreatitis  and noted to have SIRS with   leukocytosis and tachycardia. If possible, please document in progress notes   and discharge summary if you are evaluating and/or treating any of the   following: The medical record reflects the following:  Risk Factors: acute pancreatitis  Clinical Indicators: WBC 19.3, HR , CT abd pelvis-rather extensive   pancreatitis with extensive phlegmonous inflammatory changes surrounding the   pancreatitis gland  Treatment: NS bolus 500 ml and 1000 ml IV, blood cultures, NPO, LR @ 150   ml/hr, general surgeon consult    Thank you,  Kvng Sargent, [facility]  [phone]  Options provided:  -- SIRS of non-infectious origin due to acute pancreatitis without acute organ   dysfunction  -- Acute pancreatitis only  -- Other - I will add my own diagnosis  -- Disagree - Not applicable / Not valid  -- Disagree - Clinically unable to determine / Unknown  -- Refer to Clinical Documentation Reviewer    PROVIDER RESPONSE TEXT:    This patient has acute pancreatitis only.     Query created by: Chetan Arvizu on 3/2/2022 1:25 PM      Electronically signed by:  Jaqueline Leach 3/3/2022 10:14 AM

## 2022-03-03 NOTE — PROGRESS NOTES
Daily Progress Note    Date:3/3/2022  Patient: Rukhsana Olvera  : 1950  Jae Zhao MD    Admit Date: 2022  7:24 PM   LOS: 3 days       Subjective: Patient seen while lying in bed reporting was feeling a little better today. Continues to have abdominal distention no new nausea vomiting reported states she has not had any bowel movements and not passing flatus. Summary: 72-year-old male with metastatic renal cell carcinoma followed at [facility], presented with severe epigastric and right upper quadrant pain progressively worsening over the past few days with radiation to the back, with lipase over 3000 and CT abdomen findings consistent with acute pancreatitis, also noted to have cholelithiasis with pericholecystic inflammation present concerning for cholecystitis. Findings concerning for gallstone pancreatitis. Patient maintained on IV fluids with pain control. General surgery consulted to evaluate for potential cholecystectomy. Review of Systems    Comprehensive ROS completed and is negative except as otherwise noted        Objective:      Vital signs in last 24 hours:  Patient Vitals for the past 24 hrs:   BP Temp Temp src Pulse Resp SpO2   22 0754 133/83 99.3 °F (37.4 °C) Temporal 124 16 91 %   22 0414 (!) 144/84 97.7 °F (36.5 °C) Temporal 106 16 92 %   22 2132 (!) 146/74 98.2 °F (36.8 °C) Temporal 112 16 94 %   22 1720 (!) 150/73 99 °F (37.2 °C) Temporal 131 16 93 %     Physical exam    General: Resting in no acute distress  Psych: Normal mood and affect  HEENT: NC/AT, no scleral icterus  Cardiovascular: Regular rhythm, pulses 2+ and equal  Respiratory: Clear to auscultation bilaterally, no wheezes  Abdomen: Mild epigastric and right upper quadrant tenderness noted, abdomen distended tympanic bowel sounds present in all 4 quadrants.   Neurologic: Nonfocal exam, normal speech  Extremities: No clubbing cyanosis or edema  Musculoskeletal: No deformities appreciated  Skin: Warm and dry, non-jaundiced    Lab Review   Recent Results (from the past 24 hour(s))   CBC with Auto Differential    Collection Time: 03/03/22  3:05 AM   Result Value Ref Range    WBC 14.3 (H) 4.8 - 10.8 K/uL    RBC 4.52 (L) 4.70 - 6.10 M/uL    Hemoglobin 13.6 (L) 14.0 - 18.0 g/dL    Hematocrit 43.1 42.0 - 52.0 %    MCV 95.4 (H) 80.0 - 94.0 fL    MCH 30.1 27.0 - 31.0 pg    MCHC 31.6 (L) 33.0 - 37.0 g/dL    RDW 14.6 (H) 11.5 - 14.5 %    Platelets 966 (L) 342 - 400 K/uL    MPV 10.8 9.4 - 12.4 fL    PLATELET SLIDE REVIEW Decreased     Neutrophils % 80.0 (H) 50.0 - 65.0 %    Lymphocytes % 4.0 (L) 20.0 - 40.0 %    Monocytes % 4.0 0.0 - 10.0 %    Eosinophils % 0.0 0.0 - 5.0 %    Basophils % 0.0 0.0 - 1.0 %    Neutrophils Absolute 12.4 (H) 1.5 - 7.5 K/uL    Immature Granulocytes # 0.1 K/uL    Lymphocytes Absolute 1.3 1.1 - 4.5 K/uL    Monocytes Absolute 0.60 0.00 - 0.90 K/uL    Eosinophils Absolute 0.00 0.00 - 0.60 K/uL    Basophils Absolute 0.00 0.00 - 0.20 K/uL    Bands Relative 7 (H) 0 - 5 %    Atypical Lymphocytes Relative 5 0 - 8 %    Anisocytosis 1+ (A)     Macrocytes 1+ (A)     Hypochromia 1+ (A)    Comprehensive Metabolic Panel w/ Reflex to MG    Collection Time: 03/03/22  3:05 AM   Result Value Ref Range    Sodium 137 136 - 145 mmol/L    Potassium reflex Magnesium 4.1 3.5 - 5.0 mmol/L    Chloride 101 98 - 111 mmol/L    CO2 22 22 - 29 mmol/L    Anion Gap 14 7 - 19 mmol/L    Glucose 156 (H) 74 - 109 mg/dL    BUN 29 (H) 8 - 23 mg/dL    CREATININE 1.0 0.5 - 1.2 mg/dL    GFR Non-African American >60 >60    GFR African American >59 >59    Calcium 7.6 (L) 8.8 - 10.2 mg/dL    Total Protein 5.3 (L) 6.6 - 8.7 g/dL    Albumin 3.4 (L) 3.5 - 5.2 g/dL    Total Bilirubin 1.4 (H) 0.2 - 1.2 mg/dL    Alkaline Phosphatase 68 40 - 130 U/L    ALT 16 5 - 41 U/L    AST 28 5 - 40 U/L   Lipase    Collection Time: 03/03/22  3:05 AM   Result Value Ref Range    Lipase 267 (H) 13 - 60 U/L   Lactate Dehydrogenase    Collection Time: 03/03/22  3:05 AM   Result Value Ref Range     (H) 91 - 215 U/L       No intake/output data recorded. No intake/output data recorded.       Current Facility-Administered Medications:     bisacodyl (DULCOLAX) suppository 10 mg, 10 mg, Rectal, Daily PRN, Cece Juarez MD    piperacillin-tazobactam (ZOSYN) 3,375 mg in dextrose 5 % 50 mL IVPB extended infusion (mini-bag), 3,375 mg, IntraVENous, Q8H, Cece Juarez MD, Last Rate: 12.5 mL/hr at 03/03/22 0820, 3,375 mg at 03/03/22 0820    tamsulosin (FLOMAX) capsule 0.4 mg, 0.4 mg, Oral, Daily, Cece Juarez MD, 0.4 mg at 03/03/22 0809    polyethylene glycol (GLYCOLAX) packet 17 g, 17 g, Oral, Daily, Cece Juarez MD, 17 g at 03/03/22 0809    docusate sodium (COLACE) capsule 100 mg, 100 mg, Oral, Daily, Cece Juarez MD, 100 mg at 03/03/22 0809    oxyCODONE-acetaminophen (PERCOCET)  MG per tablet 1 tablet, 1 tablet, Oral, Q4H PRN, Patience DO Abbe, 1 tablet at 03/01/22 0438    lactated ringers infusion, , IntraVENous, Continuous, Cece Juarez MD, Last Rate: 100 mL/hr at 03/02/22 1214, Rate Change at 03/02/22 1214    HYDROmorphone HCl PF (DILAUDID) injection 0.5 mg, 0.5 mg, IntraVENous, Q4H PRN, Floresita Edwards MD, 0.5 mg at 03/03/22 0818    [Held by provider] famotidine (PEPCID) tablet 20 mg, 20 mg, Oral, Daily, Floresita Edwards MD    cetirizine (ZYRTEC) tablet 10 mg, 10 mg, Oral, Daily, Floresita Edwards MD, 10 mg at 03/03/22 0809    famotidine (PEPCID) injection 20 mg, 20 mg, IntraVENous, BID, Floresita Edwards MD, 20 mg at 03/03/22 0809    sodium chloride flush 0.9 % injection 5-40 mL, 5-40 mL, IntraVENous, 2 times per day, Patience DO Abbe, 10 mL at 03/03/22 0830    sodium chloride flush 0.9 % injection 5-40 mL, 5-40 mL, IntraVENous, PRN, Patience AbbeDO alireza    enoxaparin (LOVENOX) injection 40 mg, 40 mg, SubCUTAneous, Daily, Patience DO Abbe, 40 mg at 03/03/22 0809    ondansetron (ZOFRAN-ODT) disintegrating tablet 4 mg, 4 mg, Oral, Q8H PRN **OR** ondansetron (ZOFRAN) injection 4 mg, 4 mg, IntraVENous, Q6H PRN, Patience Abbe, DO, 4 mg at 03/01/22 0049    polyethylene glycol (GLYCOLAX) packet 17 g, 17 g, Oral, Daily PRN, Patience Abbe, DO    acetaminophen (TYLENOL) tablet 650 mg, 650 mg, Oral, Q6H PRN **OR** acetaminophen (TYLENOL) suppository 650 mg, 650 mg, Rectal, Q6H PRN, Patience Abbe, DO        Assessment/plan  Principal Problem:    Pancreatitis, gallstone  Active Problems:    Acute pancreatitis    Cholelithiasis    Dehydration    Abdominal pain  Resolved Problems:    * No resolved hospital problems. *      Acute pancreatitis, suspect gallstone pancreatitis with cholelithiasis  Pericholecystic inflammation noted on imaging  CT abdomen and gallbladder ultrasound reviewed  General surgery consult follow-up and reevaluation appreciated. Plan for possible cholecystectomy when more stable. Continue IV fluid resuscitation, cautiously as patient seems to require oxygen likely due to fluid overload. Will continue with IV fluids for hydration. Pain control   N.p.o. except sips of meds  Triglycerides 49    Abdominal distention  KUB showing nonspecific gas pattern with dilated small bowel and air throughout most of the colon. No air in the rectum. We will check CT abdomen pelvis to rule out small bowel or large bowel obstruction. Plan n.p.o. with IV fluids for hydration. We will consider surgical service evaluation depending on CT scan results. KUB suggestive of nonspecific gas patterns. This was followed up with CT abdomen pelvis that showed probable ileus versus partial large bowel obstruction. NG tube placed to low intermittent wall suctioning. Will follow further surgical service recommendations. Leukocytosis, improved  WBC 22.02 to 14.3  No evidence of pancreatic necrosis or other clear infectious source  Patient spiked a fever overnight.   Will resume Zosyn in view of increasing leukocytosis. DVT prophylaxis with Lovenox    Plan of care to be adjusted pending response from treatment and further evaluation.     Jose Daniel Terrell MD 3/3/2022 10:22 AM

## 2022-03-03 NOTE — PROGRESS NOTES
[facility] General Surgery Progress Note    Chief Complaint:   Chief Complaint   Patient presents with    Abdominal Pain     all over but worse in LRQ       SUBJECTIVE:  Mr. Saint Clair is a 73 y.o. male is seen and examined at bedside. Still with pain, yet it has improved. Denies vomiting. He denies fever, chills, chest pain, SOB, n/v/d/c.      OBJECTIVE:  /83   Pulse 124   Temp 99.3 °F (37.4 °C) (Temporal)   Resp 16   Wt 210 lb 14.4 oz (95.7 kg)   SpO2 91%   BMI 27.82 kg/m²   CONSTITUTIONAL: Alert, appropriate, no acute distress  SKIN: warm, dry with no rashes or lesions  HEENT: NCAT, Non icteric, PERR. Trachea Midline. CHEST/LUNGS: CTA bilaterally. Normal respiratory effort. CARDIOVASCULAR: RRR, No edema. ABDOMEN: Distended, TTP that is worse in the epigastrium  NEUROLOGIC: CN II-XI grossly intact, no motor or sensory deficits   MUSCULOSKELETAL: No clubbing or cyanosis. PSYCHIATRIC:  Normal Mood and Affect. Alert and Oriented. Labs:  CBC:   Recent Labs     02/28/22  1933 03/02/22  0252 03/03/22  0305   WBC 19.3* 22.0* 14.3*   HGB 15.5 14.4 13.6*    125* 121*     BMP:    Recent Labs     03/01/22  0103 03/02/22  0252 03/03/22  0305    137 137   K 4.4 4.3 4.1    104 101   CO2 18* 21* 22   BUN 23 27* 29*   CREATININE 0.8 0.9 1.0   GLUCOSE 164* 133* 156*       ASSESSMENT:  Principal Problem:    Pancreatitis, gallstone  Active Problems:    Acute pancreatitis    Cholelithiasis    Dehydration    Abdominal pain  Resolved Problems:    * No resolved hospital problems.  *      PLAN:  Fluid resuscitation   Diet per primary  Pain control  Plan for outpatient interval cholecystectomy once his pancreatitis has resolved    Junior Esau DO   Electronically Signed on 3/3/2022 at 8:38 AM

## 2022-03-04 LAB
ALBUMIN SERPL-MCNC: 3.3 G/DL (ref 3.5–5.2)
ALP BLD-CCNC: 69 U/L (ref 40–130)
ALT SERPL-CCNC: 16 U/L (ref 5–41)
ANION GAP SERPL CALCULATED.3IONS-SCNC: 16 MMOL/L (ref 7–19)
AST SERPL-CCNC: 24 U/L (ref 5–40)
BASOPHILS ABSOLUTE: 0 K/UL (ref 0–0.2)
BASOPHILS RELATIVE PERCENT: 0.3 % (ref 0–1)
BILIRUB SERPL-MCNC: 1.3 MG/DL (ref 0.2–1.2)
BUN BLDV-MCNC: 29 MG/DL (ref 8–23)
CALCIUM SERPL-MCNC: 7.6 MG/DL (ref 8.8–10.2)
CHLORIDE BLD-SCNC: 99 MMOL/L (ref 98–111)
CO2: 23 MMOL/L (ref 22–29)
CREAT SERPL-MCNC: 1 MG/DL (ref 0.5–1.2)
EOSINOPHILS ABSOLUTE: 0 K/UL (ref 0–0.6)
EOSINOPHILS RELATIVE PERCENT: 0.1 % (ref 0–5)
GFR AFRICAN AMERICAN: >59
GFR NON-AFRICAN AMERICAN: >60
GLUCOSE BLD-MCNC: 142 MG/DL (ref 74–109)
HCT VFR BLD CALC: 43 % (ref 42–52)
HEMOGLOBIN: 13.7 G/DL (ref 14–18)
IMMATURE GRANULOCYTES #: 0.1 K/UL
LACTATE DEHYDROGENASE: 392 U/L (ref 91–215)
LIPASE: 70 U/L (ref 13–60)
LYMPHOCYTES ABSOLUTE: 1 K/UL (ref 1.1–4.5)
LYMPHOCYTES RELATIVE PERCENT: 6.7 % (ref 20–40)
MCH RBC QN AUTO: 30.3 PG (ref 27–31)
MCHC RBC AUTO-ENTMCNC: 31.9 G/DL (ref 33–37)
MCV RBC AUTO: 95.1 FL (ref 80–94)
MONOCYTES ABSOLUTE: 1.5 K/UL (ref 0–0.9)
MONOCYTES RELATIVE PERCENT: 9.9 % (ref 0–10)
NEUTROPHILS ABSOLUTE: 12.3 K/UL (ref 1.5–7.5)
NEUTROPHILS RELATIVE PERCENT: 82.6 % (ref 50–65)
PDW BLD-RTO: 14.5 % (ref 11.5–14.5)
PLATELET # BLD: 162 K/UL (ref 130–400)
PMV BLD AUTO: 10.7 FL (ref 9.4–12.4)
POTASSIUM REFLEX MAGNESIUM: 3.9 MMOL/L (ref 3.5–5)
RBC # BLD: 4.52 M/UL (ref 4.7–6.1)
SODIUM BLD-SCNC: 138 MMOL/L (ref 136–145)
TOTAL PROTEIN: 5.2 G/DL (ref 6.6–8.7)
WBC # BLD: 14.9 K/UL (ref 4.8–10.8)

## 2022-03-04 PROCEDURE — 36415 COLL VENOUS BLD VENIPUNCTURE: CPT

## 2022-03-04 PROCEDURE — 83615 LACTATE (LD) (LDH) ENZYME: CPT

## 2022-03-04 PROCEDURE — 83690 ASSAY OF LIPASE: CPT

## 2022-03-04 PROCEDURE — 2580000003 HC RX 258: Performed by: INTERNAL MEDICINE

## 2022-03-04 PROCEDURE — 80053 COMPREHEN METABOLIC PANEL: CPT

## 2022-03-04 PROCEDURE — 6370000000 HC RX 637 (ALT 250 FOR IP): Performed by: STUDENT IN AN ORGANIZED HEALTH CARE EDUCATION/TRAINING PROGRAM

## 2022-03-04 PROCEDURE — 85025 COMPLETE CBC W/AUTO DIFF WBC: CPT

## 2022-03-04 PROCEDURE — 6370000000 HC RX 637 (ALT 250 FOR IP): Performed by: INTERNAL MEDICINE

## 2022-03-04 PROCEDURE — 6360000002 HC RX W HCPCS: Performed by: INTERNAL MEDICINE

## 2022-03-04 PROCEDURE — 99233 SBSQ HOSP IP/OBS HIGH 50: CPT | Performed by: SURGERY

## 2022-03-04 PROCEDURE — 6360000002 HC RX W HCPCS: Performed by: STUDENT IN AN ORGANIZED HEALTH CARE EDUCATION/TRAINING PROGRAM

## 2022-03-04 PROCEDURE — 1210000000 HC MED SURG R&B

## 2022-03-04 PROCEDURE — 2500000003 HC RX 250 WO HCPCS: Performed by: STUDENT IN AN ORGANIZED HEALTH CARE EDUCATION/TRAINING PROGRAM

## 2022-03-04 RX ADMIN — PIPERACILLIN AND TAZOBACTAM 3375 MG: 3; .375 INJECTION, POWDER, LYOPHILIZED, FOR SOLUTION INTRAVENOUS at 18:48

## 2022-03-04 RX ADMIN — POLYETHYLENE GLYCOL 3350 17 G: 17 POWDER, FOR SOLUTION ORAL at 08:11

## 2022-03-04 RX ADMIN — SODIUM CHLORIDE, PRESERVATIVE FREE 10 ML: 5 INJECTION INTRAVENOUS at 08:13

## 2022-03-04 RX ADMIN — DOCUSATE SODIUM 100 MG: 100 CAPSULE, LIQUID FILLED ORAL at 08:12

## 2022-03-04 RX ADMIN — PIPERACILLIN AND TAZOBACTAM 3375 MG: 3; .375 INJECTION, POWDER, LYOPHILIZED, FOR SOLUTION INTRAVENOUS at 08:15

## 2022-03-04 RX ADMIN — CETIRIZINE HYDROCHLORIDE 10 MG: 10 TABLET, FILM COATED ORAL at 08:12

## 2022-03-04 RX ADMIN — TAMSULOSIN HYDROCHLORIDE 0.4 MG: 0.4 CAPSULE ORAL at 08:12

## 2022-03-04 RX ADMIN — PIPERACILLIN AND TAZOBACTAM 3375 MG: 3; .375 INJECTION, POWDER, LYOPHILIZED, FOR SOLUTION INTRAVENOUS at 02:17

## 2022-03-04 RX ADMIN — ENOXAPARIN SODIUM 40 MG: 100 INJECTION SUBCUTANEOUS at 08:12

## 2022-03-04 RX ADMIN — HYDROMORPHONE HYDROCHLORIDE 0.5 MG: 1 INJECTION, SOLUTION INTRAMUSCULAR; INTRAVENOUS; SUBCUTANEOUS at 03:41

## 2022-03-04 RX ADMIN — HYDROMORPHONE HYDROCHLORIDE 0.5 MG: 1 INJECTION, SOLUTION INTRAMUSCULAR; INTRAVENOUS; SUBCUTANEOUS at 16:01

## 2022-03-04 RX ADMIN — HYDROMORPHONE HYDROCHLORIDE 0.5 MG: 1 INJECTION, SOLUTION INTRAMUSCULAR; INTRAVENOUS; SUBCUTANEOUS at 20:04

## 2022-03-04 RX ADMIN — FAMOTIDINE 20 MG: 10 INJECTION, SOLUTION INTRAVENOUS at 08:12

## 2022-03-04 RX ADMIN — HYDROMORPHONE HYDROCHLORIDE 0.5 MG: 1 INJECTION, SOLUTION INTRAMUSCULAR; INTRAVENOUS; SUBCUTANEOUS at 08:12

## 2022-03-04 RX ADMIN — HYDROMORPHONE HYDROCHLORIDE 0.5 MG: 1 INJECTION, SOLUTION INTRAMUSCULAR; INTRAVENOUS; SUBCUTANEOUS at 11:59

## 2022-03-04 RX ADMIN — FAMOTIDINE 20 MG: 10 INJECTION, SOLUTION INTRAVENOUS at 20:05

## 2022-03-04 ASSESSMENT — PAIN SCALES - GENERAL
PAINLEVEL_OUTOF10: 4
PAINLEVEL_OUTOF10: 2
PAINLEVEL_OUTOF10: 6
PAINLEVEL_OUTOF10: 2
PAINLEVEL_OUTOF10: 9
PAINLEVEL_OUTOF10: 5
PAINLEVEL_OUTOF10: 1

## 2022-03-04 NOTE — PROGRESS NOTES
Daily Progress Note    Date:3/4/2022  Patient: Olga Duggan  : 1950  Scooter Reed MD    Admit Date: 2022  7:24 PM   LOS: 4 days       Subjective: Patient reports to have had some flatus. Continues to have abdominal distention with some slight improvement. NG tube in place to low intermittent wall suctioning. No fever, nausea vomiting or diarrhea reported. Summary: 77-year-old male with metastatic renal cell carcinoma followed at [facility], presented with severe epigastric and right upper quadrant pain progressively worsening over the past few days with radiation to the back, with lipase over 3000 and CT abdomen findings consistent with acute pancreatitis, also noted to have cholelithiasis with pericholecystic inflammation present concerning for cholecystitis. Findings concerning for gallstone pancreatitis. Patient maintained on IV fluids with pain control. General surgery consulted to evaluate for potential cholecystectomy. Review of Systems    Comprehensive ROS completed and is negative except as otherwise noted        Objective:      Vital signs in last 24 hours:  Patient Vitals for the past 24 hrs:   BP Temp Temp src Pulse Resp SpO2 Height   22 1031 -- -- -- -- -- -- 6' 1\" (1.854 m)   22 0732 (!) 145/81 95.5 °F (35.3 °C) -- 123 -- 91 % --   22 0441 (!) 154/80 97.5 °F (36.4 °C) -- 126 18 91 % --   22 2047 (!) 153/76 97.2 °F (36.2 °C) -- 122 18 91 % --   22 1551 (!) 147/73 98.6 °F (37 °C) Temporal 127 18 (!) 89 % --     Physical exam    General: Resting in no acute distress  Psych: Normal mood and affect  HEENT: NC/AT, no scleral icterus.   NG tube in place to low intermittent wall suctioning  Cardiovascular: Regular rhythm, pulses 2+ and equal  Respiratory: Clear to auscultation bilaterally, no wheezes  Abdomen: Mild epigastric and right upper quadrant tenderness noted, abdomen distended tympanic bowel sounds present in all 4 quadrants.   Neurologic: Nonfocal exam, normal speech  Extremities: No clubbing cyanosis or edema  Musculoskeletal: No deformities appreciated  Skin: Warm and dry, non-jaundiced    Lab Review   Recent Results (from the past 24 hour(s))   CBC with Auto Differential    Collection Time: 03/04/22  2:04 AM   Result Value Ref Range    WBC 14.9 (H) 4.8 - 10.8 K/uL    RBC 4.52 (L) 4.70 - 6.10 M/uL    Hemoglobin 13.7 (L) 14.0 - 18.0 g/dL    Hematocrit 43.0 42.0 - 52.0 %    MCV 95.1 (H) 80.0 - 94.0 fL    MCH 30.3 27.0 - 31.0 pg    MCHC 31.9 (L) 33.0 - 37.0 g/dL    RDW 14.5 11.5 - 14.5 %    Platelets 218 559 - 790 K/uL    MPV 10.7 9.4 - 12.4 fL    Neutrophils % 82.6 (H) 50.0 - 65.0 %    Lymphocytes % 6.7 (L) 20.0 - 40.0 %    Monocytes % 9.9 0.0 - 10.0 %    Eosinophils % 0.1 0.0 - 5.0 %    Basophils % 0.3 0.0 - 1.0 %    Neutrophils Absolute 12.3 (H) 1.5 - 7.5 K/uL    Immature Granulocytes # 0.1 K/uL    Lymphocytes Absolute 1.0 (L) 1.1 - 4.5 K/uL    Monocytes Absolute 1.50 (H) 0.00 - 0.90 K/uL    Eosinophils Absolute 0.00 0.00 - 0.60 K/uL    Basophils Absolute 0.00 0.00 - 0.20 K/uL   Comprehensive Metabolic Panel w/ Reflex to MG    Collection Time: 03/04/22  2:04 AM   Result Value Ref Range    Sodium 138 136 - 145 mmol/L    Potassium reflex Magnesium 3.9 3.5 - 5.0 mmol/L    Chloride 99 98 - 111 mmol/L    CO2 23 22 - 29 mmol/L    Anion Gap 16 7 - 19 mmol/L    Glucose 142 (H) 74 - 109 mg/dL    BUN 29 (H) 8 - 23 mg/dL    CREATININE 1.0 0.5 - 1.2 mg/dL    GFR Non-African American >60 >60    GFR African American >59 >59    Calcium 7.6 (L) 8.8 - 10.2 mg/dL    Total Protein 5.2 (L) 6.6 - 8.7 g/dL    Albumin 3.3 (L) 3.5 - 5.2 g/dL    Total Bilirubin 1.3 (H) 0.2 - 1.2 mg/dL    Alkaline Phosphatase 69 40 - 130 U/L    ALT 16 5 - 41 U/L    AST 24 5 - 40 U/L   Lipase    Collection Time: 03/04/22  2:04 AM   Result Value Ref Range    Lipase 70 (H) 13 - 60 U/L   Lactate Dehydrogenase    Collection Time: 03/04/22  2:04 AM   Result Value Ref Range     (H) 91 - 215 U/L       I/O last 3 completed shifts:   In: 2317 [I.V.:1554]  Out: 2100 [Urine:550; Emesis/NG output:1550]  I/O this shift:  In: -   Out: 1100 [Emesis/NG output:1100]      Current Facility-Administered Medications:     bisacodyl (DULCOLAX) suppository 10 mg, 10 mg, Rectal, Daily PRN, Jose Daniel Terrell MD    piperacillin-tazobactam (ZOSYN) 3,375 mg in dextrose 5 % 50 mL IVPB extended infusion (mini-bag), 3,375 mg, IntraVENous, Q8H, Jose Daniel Terrell MD, Stopped at 03/04/22 1153    tamsulosin (FLOMAX) capsule 0.4 mg, 0.4 mg, Oral, Daily, Jose Daniel Terrell MD, 0.4 mg at 03/04/22 4666    polyethylene glycol (GLYCOLAX) packet 17 g, 17 g, Oral, Daily, Jose Daniel Terrell MD, 17 g at 03/04/22 0811    docusate sodium (COLACE) capsule 100 mg, 100 mg, Oral, Daily, Jose Daniel Terrell MD, 100 mg at 03/04/22 0812    oxyCODONE-acetaminophen (PERCOCET)  MG per tablet 1 tablet, 1 tablet, Oral, Q4H PRN, Teena Mcadams DO, 1 tablet at 03/01/22 0438    lactated ringers infusion, , IntraVENous, Continuous, Jose Daniel Terrell MD, Last Rate: 100 mL/hr at 03/02/22 1214, Rate Change at 03/02/22 1214    HYDROmorphone HCl PF (DILAUDID) injection 0.5 mg, 0.5 mg, IntraVENous, Q4H PRN, Braden Machado MD, 0.5 mg at 03/04/22 1159    [Held by provider] famotidine (PEPCID) tablet 20 mg, 20 mg, Oral, Daily, Braden Machado MD    cetirizine (ZYRTEC) tablet 10 mg, 10 mg, Oral, Daily, Braden Machado MD, 10 mg at 03/04/22 6487    famotidine (PEPCID) injection 20 mg, 20 mg, IntraVENous, BID, Braden Machado MD, 20 mg at 03/04/22 6017    sodium chloride flush 0.9 % injection 5-40 mL, 5-40 mL, IntraVENous, 2 times per day, Patience Abbe, DO, 10 mL at 03/04/22 0813    sodium chloride flush 0.9 % injection 5-40 mL, 5-40 mL, IntraVENous, PRN, Patience Abbe, DO    enoxaparin (LOVENOX) injection 40 mg, 40 mg, SubCUTAneous, Daily, Patience Abbe, DO, 40 mg at 03/04/22 0812    ondansetron (ZOFRAN-ODT) disintegrating tablet 4 mg, 4 mg, Oral, Q8H PRN **OR** ondansetron (ZOFRAN) injection 4 mg, 4 mg, IntraVENous, Q6H PRN, Patience Abbe, DO, 4 mg at 03/03/22 1152    polyethylene glycol (GLYCOLAX) packet 17 g, 17 g, Oral, Daily PRN, Patience Abbe, DO    acetaminophen (TYLENOL) tablet 650 mg, 650 mg, Oral, Q6H PRN **OR** acetaminophen (TYLENOL) suppository 650 mg, 650 mg, Rectal, Q6H PRN, Patience Abbe, DO        Assessment/plan  Principal Problem:    Pancreatitis, gallstone  Active Problems:    Acute pancreatitis    Cholelithiasis    Dehydration    Abdominal pain  Resolved Problems:    * No resolved hospital problems. *      Acute pancreatitis, suspect gallstone pancreatitis with cholelithiasis  Pericholecystic inflammation noted on imaging  CT abdomen and gallbladder ultrasound reviewed  General surgery consult follow-up and reevaluation appreciated. Plan for possible cholecystectomy when more stable. Continue IV fluid resuscitation, cautiously as patient seems to require oxygen likely due to fluid overload. Will continue with IV fluids for hydration. Pain control   N.p.o. except sips of meds  Triglycerides 49    Abdominal distention  KUB showing nonspecific gas pattern with dilated small bowel and air throughout most of the colon. No air in the rectum. We will check CT abdomen pelvis to rule out small bowel or large bowel obstruction. Plan n.p.o. with IV fluids for hydration. KUB suggestive of nonspecific gas patterns. This was followed up with CT abdomen pelvis that showed probable ileus versus partial large bowel obstruction. NG tube placed to low intermittent wall suctioning. Will follow further surgical service recommendations. Leukocytosis, improved  WBC 22.02 to 14.9  No evidence of pancreatic necrosis or other clear infectious source  Patient spiked a fever overnight. Will cont Zosyn to de-escalate within the next 24 to 48 hours.      DVT prophylaxis with Lovenox    GI prophylaxis, Pepcid    Plan of care to be adjusted pending response from treatment and further evaluation.     Isaac Brizuela MD 3/4/2022 12:37 PM

## 2022-03-04 NOTE — PLAN OF CARE
Nutrition Problem #1: Inadequate oral intake  Intervention: Food and/or Nutrient Delivery: Continue NPO,Start Parenteral Nutrition  Nutritional Goals: Pt will progress to an oral diet or nutritional needs will be met with LILIANA

## 2022-03-04 NOTE — PROGRESS NOTES
Comprehensive Nutrition Assessment    Type and Reason for Visit:  Initial,NPO/Clear Liquid    Nutrition Recommendations/Plan: TPN    Nutrition Assessment:  Pt is nutritionally compromised AEB NPO Day 4. NGT to LIS. Recommend to initiate short-term TPN to meet nutritional needs. Malnutrition Assessment:  Malnutrition Status: At risk for malnutrition (Comment)    Context:  Acute Illness     Findings of the 6 clinical characteristics of malnutrition:  Energy Intake:  Mild decrease in energy intake (Comment)  Weight Loss:  No significant weight loss     Body Fat Loss:  No significant body fat loss     Muscle Mass Loss:  No significant muscle mass loss    Fluid Accumulation:  No significant fluid accumulation     Strength:  Not Performed    Estimated Daily Nutrient Needs:  Energy (kcal):  0736-4187 kcals/day; Weight Used for Energy Requirements:  Current (20-25)     Protein (g):  109-168 g/PRO/day; Weight Used for Protein Requirements:  Ideal (1.3-2.0)        Fluid (ml/day):  3533-5026 mL/day; Method Used for Fluid Requirements:  1 ml/kcal       Current Nutrition Therapies:    Diet NPO Exceptions are: Sips of Water with Meds, Sips of Clear Liquids, Ice Chips, Popsicles    Anthropometric Measures:  · Height: 6' 1\" (185.4 cm)  · Current Body Weight: 210 lb (95.3 kg)     · Ideal Body Weight: 184 lbs  · BMI: 27.7   · BMI Categories: Overweight (BMI 25.0-29. 9)       Nutrition Diagnosis:   · Inadequate oral intake related to acute injury/trauma as evidenced by NPO or clear liquid status due to medical condition    Nutrition Interventions:   Food and/or Nutrient Delivery:  Continue NPO,Start Parenteral Nutrition  Coordination of Nutrition Care:  Continue to monitor while inpatient    Goals:  Pt will progress to an oral diet or nutritional needs will be met with LILIANA       Nutrition Monitoring and Evaluation:   Food/Nutrient Intake Outcomes:  Diet Advancement/Tolerance  Physical Signs/Symptoms Outcomes:  Biochemical Data,Nausea or Vomiting,Fluid Status or Edema,Nutrition Focused Physical Findings,Weight     Electronically signed by Jonathan Herrera, MS, RD, LD on 3/4/22 at 10:36 AM CST    Contact: [phone]

## 2022-03-05 LAB
ALBUMIN SERPL-MCNC: 3.2 G/DL (ref 3.5–5.2)
ALP BLD-CCNC: 66 U/L (ref 40–130)
ALT SERPL-CCNC: 15 U/L (ref 5–41)
ANION GAP SERPL CALCULATED.3IONS-SCNC: 14 MMOL/L (ref 7–19)
AST SERPL-CCNC: 20 U/L (ref 5–40)
BASOPHILS ABSOLUTE: 0 K/UL (ref 0–0.2)
BASOPHILS RELATIVE PERCENT: 0.2 % (ref 0–1)
BILIRUB SERPL-MCNC: 1.1 MG/DL (ref 0.2–1.2)
BUN BLDV-MCNC: 33 MG/DL (ref 8–23)
CALCIUM SERPL-MCNC: 8.2 MG/DL (ref 8.8–10.2)
CHLORIDE BLD-SCNC: 101 MMOL/L (ref 98–111)
CO2: 26 MMOL/L (ref 22–29)
CREAT SERPL-MCNC: 1 MG/DL (ref 0.5–1.2)
EOSINOPHILS ABSOLUTE: 0 K/UL (ref 0–0.6)
EOSINOPHILS RELATIVE PERCENT: 0.2 % (ref 0–5)
GFR AFRICAN AMERICAN: >59
GFR NON-AFRICAN AMERICAN: >60
GLUCOSE BLD-MCNC: 170 MG/DL (ref 74–109)
HCT VFR BLD CALC: 40.1 % (ref 42–52)
HEMOGLOBIN: 12.9 G/DL (ref 14–18)
IMMATURE GRANULOCYTES #: 0.1 K/UL
LACTATE DEHYDROGENASE: 351 U/L (ref 91–215)
LIPASE: 38 U/L (ref 13–60)
LYMPHOCYTES ABSOLUTE: 0.9 K/UL (ref 1.1–4.5)
LYMPHOCYTES RELATIVE PERCENT: 5.6 % (ref 20–40)
MCH RBC QN AUTO: 30.3 PG (ref 27–31)
MCHC RBC AUTO-ENTMCNC: 32.2 G/DL (ref 33–37)
MCV RBC AUTO: 94.1 FL (ref 80–94)
MONOCYTES ABSOLUTE: 1.6 K/UL (ref 0–0.9)
MONOCYTES RELATIVE PERCENT: 10.2 % (ref 0–10)
NEUTROPHILS ABSOLUTE: 12.8 K/UL (ref 1.5–7.5)
NEUTROPHILS RELATIVE PERCENT: 83.1 % (ref 50–65)
PDW BLD-RTO: 14.5 % (ref 11.5–14.5)
PLATELET # BLD: 182 K/UL (ref 130–400)
PMV BLD AUTO: 10.3 FL (ref 9.4–12.4)
POTASSIUM REFLEX MAGNESIUM: 3.7 MMOL/L (ref 3.5–5)
RBC # BLD: 4.26 M/UL (ref 4.7–6.1)
SODIUM BLD-SCNC: 141 MMOL/L (ref 136–145)
TOTAL PROTEIN: 5.4 G/DL (ref 6.6–8.7)
WBC # BLD: 15.4 K/UL (ref 4.8–10.8)

## 2022-03-05 PROCEDURE — 2580000003 HC RX 258: Performed by: INTERNAL MEDICINE

## 2022-03-05 PROCEDURE — 6370000000 HC RX 637 (ALT 250 FOR IP): Performed by: INTERNAL MEDICINE

## 2022-03-05 PROCEDURE — 1210000000 HC MED SURG R&B

## 2022-03-05 PROCEDURE — 6360000002 HC RX W HCPCS: Performed by: INTERNAL MEDICINE

## 2022-03-05 PROCEDURE — 83615 LACTATE (LD) (LDH) ENZYME: CPT

## 2022-03-05 PROCEDURE — 6370000000 HC RX 637 (ALT 250 FOR IP): Performed by: STUDENT IN AN ORGANIZED HEALTH CARE EDUCATION/TRAINING PROGRAM

## 2022-03-05 PROCEDURE — 2500000003 HC RX 250 WO HCPCS: Performed by: STUDENT IN AN ORGANIZED HEALTH CARE EDUCATION/TRAINING PROGRAM

## 2022-03-05 PROCEDURE — 2500000003 HC RX 250 WO HCPCS: Performed by: INTERNAL MEDICINE

## 2022-03-05 PROCEDURE — 85025 COMPLETE CBC W/AUTO DIFF WBC: CPT

## 2022-03-05 PROCEDURE — 80053 COMPREHEN METABOLIC PANEL: CPT

## 2022-03-05 PROCEDURE — 83690 ASSAY OF LIPASE: CPT

## 2022-03-05 PROCEDURE — 99231 SBSQ HOSP IP/OBS SF/LOW 25: CPT | Performed by: SURGERY

## 2022-03-05 PROCEDURE — 36415 COLL VENOUS BLD VENIPUNCTURE: CPT

## 2022-03-05 PROCEDURE — 6360000002 HC RX W HCPCS: Performed by: STUDENT IN AN ORGANIZED HEALTH CARE EDUCATION/TRAINING PROGRAM

## 2022-03-05 RX ORDER — DEXTROSE, SODIUM CHLORIDE, AND POTASSIUM CHLORIDE 5; .9; .15 G/100ML; G/100ML; G/100ML
INJECTION INTRAVENOUS CONTINUOUS
Status: DISCONTINUED | OUTPATIENT
Start: 2022-03-05 | End: 2022-03-08

## 2022-03-05 RX ADMIN — DOCUSATE SODIUM 100 MG: 100 CAPSULE, LIQUID FILLED ORAL at 09:09

## 2022-03-05 RX ADMIN — PIPERACILLIN AND TAZOBACTAM 3375 MG: 3; .375 INJECTION, POWDER, LYOPHILIZED, FOR SOLUTION INTRAVENOUS at 00:00

## 2022-03-05 RX ADMIN — PIPERACILLIN AND TAZOBACTAM 3375 MG: 3; .375 INJECTION, POWDER, LYOPHILIZED, FOR SOLUTION INTRAVENOUS at 09:12

## 2022-03-05 RX ADMIN — HYDROMORPHONE HYDROCHLORIDE 0.5 MG: 1 INJECTION, SOLUTION INTRAMUSCULAR; INTRAVENOUS; SUBCUTANEOUS at 04:00

## 2022-03-05 RX ADMIN — POLYETHYLENE GLYCOL 3350 17 G: 17 POWDER, FOR SOLUTION ORAL at 09:21

## 2022-03-05 RX ADMIN — OXYCODONE AND ACETAMINOPHEN 1 TABLET: 10; 325 TABLET ORAL at 11:03

## 2022-03-05 RX ADMIN — FAMOTIDINE 20 MG: 10 INJECTION, SOLUTION INTRAVENOUS at 09:29

## 2022-03-05 RX ADMIN — HYDROMORPHONE HYDROCHLORIDE 0.5 MG: 1 INJECTION, SOLUTION INTRAMUSCULAR; INTRAVENOUS; SUBCUTANEOUS at 09:04

## 2022-03-05 RX ADMIN — HYDROMORPHONE HYDROCHLORIDE 0.5 MG: 1 INJECTION, SOLUTION INTRAMUSCULAR; INTRAVENOUS; SUBCUTANEOUS at 13:54

## 2022-03-05 RX ADMIN — CETIRIZINE HYDROCHLORIDE 10 MG: 10 TABLET, FILM COATED ORAL at 09:09

## 2022-03-05 RX ADMIN — HYDROMORPHONE HYDROCHLORIDE 0.5 MG: 1 INJECTION, SOLUTION INTRAMUSCULAR; INTRAVENOUS; SUBCUTANEOUS at 00:04

## 2022-03-05 RX ADMIN — HYDROMORPHONE HYDROCHLORIDE 0.5 MG: 1 INJECTION, SOLUTION INTRAMUSCULAR; INTRAVENOUS; SUBCUTANEOUS at 19:26

## 2022-03-05 RX ADMIN — PIPERACILLIN AND TAZOBACTAM 3375 MG: 3; .375 INJECTION, POWDER, LYOPHILIZED, FOR SOLUTION INTRAVENOUS at 19:28

## 2022-03-05 RX ADMIN — OXYCODONE AND ACETAMINOPHEN 1 TABLET: 10; 325 TABLET ORAL at 16:52

## 2022-03-05 RX ADMIN — HYDROMORPHONE HYDROCHLORIDE 0.5 MG: 1 INJECTION, SOLUTION INTRAMUSCULAR; INTRAVENOUS; SUBCUTANEOUS at 23:17

## 2022-03-05 RX ADMIN — POTASSIUM CHLORIDE, DEXTROSE MONOHYDRATE AND SODIUM CHLORIDE: 150; 5; 900 INJECTION, SOLUTION INTRAVENOUS at 11:08

## 2022-03-05 RX ADMIN — SODIUM CHLORIDE, PRESERVATIVE FREE 10 ML: 5 INJECTION INTRAVENOUS at 19:34

## 2022-03-05 RX ADMIN — ENOXAPARIN SODIUM 40 MG: 100 INJECTION SUBCUTANEOUS at 09:23

## 2022-03-05 RX ADMIN — FAMOTIDINE 20 MG: 10 INJECTION, SOLUTION INTRAVENOUS at 19:34

## 2022-03-05 RX ADMIN — TAMSULOSIN HYDROCHLORIDE 0.4 MG: 0.4 CAPSULE ORAL at 09:09

## 2022-03-05 RX ADMIN — OXYCODONE AND ACETAMINOPHEN 1 TABLET: 10; 325 TABLET ORAL at 20:56

## 2022-03-05 ASSESSMENT — PAIN SCALES - GENERAL
PAINLEVEL_OUTOF10: 4
PAINLEVEL_OUTOF10: 5
PAINLEVEL_OUTOF10: 5
PAINLEVEL_OUTOF10: 8
PAINLEVEL_OUTOF10: 2
PAINLEVEL_OUTOF10: 5
PAINLEVEL_OUTOF10: 4
PAINLEVEL_OUTOF10: 10
PAINLEVEL_OUTOF10: 5
PAINLEVEL_OUTOF10: 4
PAINLEVEL_OUTOF10: 6
PAINLEVEL_OUTOF10: 0
PAINLEVEL_OUTOF10: 5

## 2022-03-05 ASSESSMENT — PAIN DESCRIPTION - PAIN TYPE
TYPE: CHRONIC PAIN
TYPE: CHRONIC PAIN

## 2022-03-05 ASSESSMENT — PAIN DESCRIPTION - LOCATION
LOCATION: BACK
LOCATION: BACK;ABDOMEN

## 2022-03-05 NOTE — PLAN OF CARE
Problem: Nutrition  Goal: Optimal nutrition therapy  3/5/2022 0716 by Marni Miranda RN  Outcome: Ongoing  3/4/2022 2200 by Steve Bangura RN  Outcome: Ongoing

## 2022-03-05 NOTE — PROGRESS NOTES
Daily Progress Note    Date:3/5/2022  Patient: Hernandez Kerr  : 1950  Magi Brady MD    Admit Date: 2022  7:24 PM   LOS: 5 days       Subjective: Patient continues to have NG tube to low intermittent wall suctioning. Reports that it is uncomfortable and asking for it to be clamped so he can walk a bit. He reports having had some flatus but continues to have abdominal distention. No bowel movement reported. Summary: 42-year-old male with metastatic renal cell carcinoma followed at [facility], presented with severe epigastric and right upper quadrant pain progressively worsening over the past few days with radiation to the back, with lipase over 3000 and CT abdomen findings consistent with acute pancreatitis, also noted to have cholelithiasis with pericholecystic inflammation present concerning for cholecystitis. Findings concerning for gallstone pancreatitis. Patient maintained on IV fluids with pain control. General surgery consulted to evaluate for potential cholecystectomy. Review of Systems    Comprehensive ROS completed and is negative except as otherwise noted        Objective:      Vital signs in last 24 hours:  Patient Vitals for the past 24 hrs:   BP Temp Temp src Pulse Resp SpO2 Height   22 0915 -- -- -- -- -- -- 6' 1\" (1.854 m)   22 0811 (!) 150/67 98.4 °F (36.9 °C) Temporal 107 16 92 % --   22 0005 (!) 146/76 98.6 °F (37 °C) Temporal 106 -- 91 % --   22 1721 130/75 96.4 °F (35.8 °C) Temporal 117 18 91 % --   22 1031 -- -- -- -- -- -- 6' 1\" (1.854 m)     Physical exam    General: Resting in no acute distress  Psych: Normal mood and affect  HEENT: NC/AT, no scleral icterus.   NG tube in place to low intermittent wall suctioning  Cardiovascular: Regular rhythm, pulses 2+ and equal  Respiratory: Clear to auscultation bilaterally, no wheezes  Abdomen: Mild epigastric tenderness noted, abdomen distended distant tympanic bowel sounds present.   Neurologic: Nonfocal exam, normal speech  Extremities: No clubbing cyanosis or edema  Musculoskeletal: No deformities appreciated  Skin: Warm and dry, non-jaundiced    Lab Review   Recent Results (from the past 24 hour(s))   CBC with Auto Differential    Collection Time: 03/05/22  3:43 AM   Result Value Ref Range    WBC 15.4 (H) 4.8 - 10.8 K/uL    RBC 4.26 (L) 4.70 - 6.10 M/uL    Hemoglobin 12.9 (L) 14.0 - 18.0 g/dL    Hematocrit 40.1 (L) 42.0 - 52.0 %    MCV 94.1 (H) 80.0 - 94.0 fL    MCH 30.3 27.0 - 31.0 pg    MCHC 32.2 (L) 33.0 - 37.0 g/dL    RDW 14.5 11.5 - 14.5 %    Platelets 453 311 - 843 K/uL    MPV 10.3 9.4 - 12.4 fL    Neutrophils % 83.1 (H) 50.0 - 65.0 %    Lymphocytes % 5.6 (L) 20.0 - 40.0 %    Monocytes % 10.2 (H) 0.0 - 10.0 %    Eosinophils % 0.2 0.0 - 5.0 %    Basophils % 0.2 0.0 - 1.0 %    Neutrophils Absolute 12.8 (H) 1.5 - 7.5 K/uL    Immature Granulocytes # 0.1 K/uL    Lymphocytes Absolute 0.9 (L) 1.1 - 4.5 K/uL    Monocytes Absolute 1.60 (H) 0.00 - 0.90 K/uL    Eosinophils Absolute 0.00 0.00 - 0.60 K/uL    Basophils Absolute 0.00 0.00 - 0.20 K/uL   Comprehensive Metabolic Panel w/ Reflex to MG    Collection Time: 03/05/22  3:43 AM   Result Value Ref Range    Sodium 141 136 - 145 mmol/L    Potassium reflex Magnesium 3.7 3.5 - 5.0 mmol/L    Chloride 101 98 - 111 mmol/L    CO2 26 22 - 29 mmol/L    Anion Gap 14 7 - 19 mmol/L    Glucose 170 (H) 74 - 109 mg/dL    BUN 33 (H) 8 - 23 mg/dL    CREATININE 1.0 0.5 - 1.2 mg/dL    GFR Non-African American >60 >60    GFR African American >59 >59    Calcium 8.2 (L) 8.8 - 10.2 mg/dL    Total Protein 5.4 (L) 6.6 - 8.7 g/dL    Albumin 3.2 (L) 3.5 - 5.2 g/dL    Total Bilirubin 1.1 0.2 - 1.2 mg/dL    Alkaline Phosphatase 66 40 - 130 U/L    ALT 15 5 - 41 U/L    AST 20 5 - 40 U/L   Lipase    Collection Time: 03/05/22  3:43 AM   Result Value Ref Range    Lipase 38 13 - 60 U/L   Lactate Dehydrogenase    Collection Time: 03/05/22  3:43 AM Result Value Ref Range     (H) 91 - 215 U/L       I/O last 3 completed shifts: In: 1914 [P.O.:360; I.V.:1554]  Out: 1001 [Urine:1350; Emesis/NG output:4200]  No intake/output data recorded.       Current Facility-Administered Medications:     bisacodyl (DULCOLAX) suppository 10 mg, 10 mg, Rectal, Daily PRN, Neisha Coppola MD    piperacillin-tazobactam (ZOSYN) 3,375 mg in dextrose 5 % 50 mL IVPB extended infusion (mini-bag), 3,375 mg, IntraVENous, Q8H, Neisha Coppola MD, Last Rate: 12.5 mL/hr at 03/05/22 0912, 3,375 mg at 03/05/22 0912    tamsulosin (FLOMAX) capsule 0.4 mg, 0.4 mg, Oral, Daily, Neisha Coppola MD, 0.4 mg at 03/05/22 0909    polyethylene glycol (GLYCOLAX) packet 17 g, 17 g, Oral, Daily, Neisha Coppola MD, 17 g at 03/05/22 2442    docusate sodium (COLACE) capsule 100 mg, 100 mg, Oral, Daily, Neisha Coppola MD, 100 mg at 03/05/22 0909    oxyCODONE-acetaminophen (PERCOCET)  MG per tablet 1 tablet, 1 tablet, Oral, Q4H PRN, Patience Abbe, DO, 1 tablet at 03/01/22 0438    lactated ringers infusion, , IntraVENous, Continuous, Neisha Coppola MD, Last Rate: 100 mL/hr at 03/02/22 1214, Rate Change at 03/02/22 1214    HYDROmorphone HCl PF (DILAUDID) injection 0.5 mg, 0.5 mg, IntraVENous, Q4H PRN, Jessica Edwards MD, 0.5 mg at 03/05/22 0904    [Held by provider] famotidine (PEPCID) tablet 20 mg, 20 mg, Oral, Daily, Jessica Edwards MD    cetirizine (ZYRTEC) tablet 10 mg, 10 mg, Oral, Daily, Jessica Edwards MD, 10 mg at 03/05/22 0909    famotidine (PEPCID) injection 20 mg, 20 mg, IntraVENous, BID, Jessica Edwards MD, 20 mg at 03/05/22 0929    sodium chloride flush 0.9 % injection 5-40 mL, 5-40 mL, IntraVENous, 2 times per day, Patience Abbe, DO, 10 mL at 03/04/22 0813    sodium chloride flush 0.9 % injection 5-40 mL, 5-40 mL, IntraVENous, PRN, Patience Abbe, DO    enoxaparin (LOVENOX) injection 40 mg, 40 mg, SubCUTAneous, Daily, Patience Abbe, DO, 40 mg at 03/05/22 8277   ondansetron (ZOFRAN-ODT) disintegrating tablet 4 mg, 4 mg, Oral, Q8H PRN **OR** ondansetron (ZOFRAN) injection 4 mg, 4 mg, IntraVENous, Q6H PRN, Patience Abbe, DO, 4 mg at 03/03/22 1152    polyethylene glycol (GLYCOLAX) packet 17 g, 17 g, Oral, Daily PRN, Patience Abbe, DO    acetaminophen (TYLENOL) tablet 650 mg, 650 mg, Oral, Q6H PRN **OR** acetaminophen (TYLENOL) suppository 650 mg, 650 mg, Rectal, Q6H PRN, Patience Abbe, DO        Assessment/plan  Principal Problem:    Pancreatitis, gallstone  Active Problems:    Acute pancreatitis    Cholelithiasis    Dehydration    Abdominal pain  Resolved Problems:    * No resolved hospital problems. *      Acute pancreatitis, suspect gallstone pancreatitis with cholelithiasis  Pericholecystic inflammation noted on imaging  CT abdomen and gallbladder ultrasound reviewed  General surgery consult follow-up and reevaluation appreciated. Plan for possible cholecystectomy when more stable. Continue IV fluid resuscitation, cautiously as patient seems to require oxygen likely due to fluid overload. Will continue with IV fluids for hydration. Pain control   N.p.o. except sips of meds  Triglycerides 49  Nutrition assessment suggesting TPN as alternative means of nutrition. as patient continues to remain n.p.o., to consider short-term TPN 4.25/10 @ 90ml/hr, when able to obtain PICC line. Unless surgical service suggests otherwise. Abdominal distention  KUB showing nonspecific gas pattern with dilated small bowel and air throughout most of the colon. No air in the rectum. We will check CT abdomen pelvis to rule out small bowel or large bowel obstruction. Plan n.p.o. with IV fluids for hydration. KUB suggestive of nonspecific gas patterns. This was followed up with CT abdomen pelvis that showed probable ileus versus partial large bowel obstruction. NG tube placed to low intermittent wall suctioning. Will follow further surgical service recommendations.   Surgical follow-up and reevaluation appreciated. Leukocytosis, improved  WBC 22.02 to 15.4  No evidence of pancreatic necrosis or other clear infectious source  Patient spiked a fever overnight. Will cont Zosyn to de-escalate within the next 24 to 48 hours. DVT prophylaxis with Lovenox    GI prophylaxis, Pepcid    Plan of care to be adjusted pending response from treatment and further evaluation.     Neisha Coppola MD 3/5/2022 10:05 AM

## 2022-03-05 NOTE — PLAN OF CARE
Nutrition Problem #1: Inadequate oral intake  Intervention: Food and/or Nutrient Delivery: Continue NPO  Nutritional Goals: Pt will progress to an oral diet or nutritional needs will be met with LILIANA

## 2022-03-05 NOTE — PROGRESS NOTES
Comprehensive Nutrition Assessment    Type and Reason for Visit:  Reassess    Nutrition Recommendations/Plan: start alternate source of nutrition    Nutrition Assessment:  Pt is starting day 5 of NPO. NG now to LIS. Asking for short term TPN    Malnutrition Assessment:  Malnutrition Status: At risk for malnutrition (Comment)    Context:  Acute Illness     Findings of the 6 clinical characteristics of malnutrition:  Energy Intake:  7 - 50% or less of estimated energy requirements for 5 or more days  Weight Loss:  No significant weight loss     Body Fat Loss:  No significant body fat loss     Muscle Mass Loss:  No significant muscle mass loss    Fluid Accumulation:  No significant fluid accumulation     Strength:  Not Performed    Estimated Daily Nutrient Needs:  Energy (kcal):  8839-5502 kcals/day; Weight Used for Energy Requirements:  Current (20-25)     Protein (g):  109-168 g/PRO/day; Weight Used for Protein Requirements:  Ideal (1.3-2.0)        Fluid (ml/day):  0376-2332 mL/day; Method Used for Fluid Requirements:  1 ml/kcal      Nutrition Related Findings:  NPO x 5 days      Wounds:  None       Current Nutrition Therapies:    Diet NPO Exceptions are: Sips of Water with Meds, Sips of Clear Liquids, Ice Chips, Popsicles    Anthropometric Measures:  · Height: 6' 1\" (185.4 cm)  · Current Body Weight: 210 lb 14.4 oz (95.7 kg)   · Admission Body Weight: 210 lb (95.3 kg)    · Usual Body Weight: 210 lb 12.2 oz (95.6 kg)     · Ideal Body Weight: 184 lbs; % Ideal Body Weight 114.6 %   · BMI: 27.8  · Adjusted Body Weight:  ; No Adjustment   · BMI Categories: Overweight (BMI 25.0-29. 9)       Nutrition Diagnosis:   · Inadequate oral intake related to acute injury/trauma as evidenced by NPO or clear liquid status due to medical condition      Nutrition Interventions:   Food and/or Nutrient Delivery:  Continue NPO  Nutrition Education/Counseling:  No recommendation at this time   Coordination of Nutrition Care: Continue to monitor while inpatient    Goals:  Pt will progress to an oral diet or nutritional needs will be met with LILIANA       Nutrition Monitoring and Evaluation:   Behavioral-Environmental Outcomes:  None Identified   Food/Nutrient Intake Outcomes:  Diet Advancement/Tolerance,Food and Nutrient Intake,Parenteral Nutrition Intake/Tolerance  Physical Signs/Symptoms Outcomes:  Biochemical Data,Weight,Skin,Nutrition Focused Physical Findings,Fluid Status or Edema     Discharge Planning:     Too soon to determine     Electronically signed by Bayron Franco MS, RD, LD on 3/5/22 at 9:18 AM CST    Contact: [phone]

## 2022-03-05 NOTE — PROGRESS NOTES
[facility] General Surgery    Progress Note      Subjective:    Resting in bed. Feels a bit better than yesterday but still with significant back pain. Passing his urine and also passing flatus and small bowel movements when he sips water. Has been up and walking in the hallway. Objective:    Vitals:    03/04/22 1721 03/05/22 0005 03/05/22 0811 03/05/22 0915   BP: 130/75 (!) 146/76 (!) 150/67    Pulse: 117 106 107    Resp: 18  16    Temp: 96.4 °F (35.8 °C) 98.6 °F (37 °C) 98.4 °F (36.9 °C)    TempSrc: Temporal Temporal Temporal    SpO2: 91% 91% 92%    Weight:       Height:    6' 1\" (1.854 m)     I/O last 3 completed shifts: In: 1914 [P.O.:360; I.V.:1554]  Out: 9271 [Urine:1350; Emesis/NG output:4200]     Abdomen distended but minimally tender. Bowel sounds present but hypoactive. LABS:   CBC:   Recent Labs     03/03/22 0305 03/04/22 0204 03/05/22  0343   WBC 14.3* 14.9* 15.4*   RBC 4.52* 4.52* 4.26*   HGB 13.6* 13.7* 12.9*   HCT 43.1 43.0 40.1*   * 162 182   BANDSPCT 7*  --   --    LYMPHOPCT 4.0* 6.7* 5.6*   MONOPCT 4.0 9.9 10.2*   BASOPCT 0.0 0.3 0.2   MONOSABS 0.60 1.50* 1.60*   LYMPHSABS 1.3 1.0* 0.9*   EOSABS 0.00 0.00 0.00   BASOSABS 0.00 0.00 0.00      BMP:   Recent Labs     03/03/22 0305 03/04/22 0204 03/05/22  0343    138 141   K 4.1 3.9 3.7    99 101   CO2 22 23 26   ANIONGAP 14 16 14   GLUCOSE 156* 142* 170*   CREATININE 1.0 1.0 1.0   LABGLOM >60 >60 >60   CALCIUM 7.6* 7.6* 8.2*     LFT:   Recent Labs     03/03/22  0305 03/04/22  0204 03/05/22  0343   PROT 5.3* 5.2* 5.4*   LABALBU 3.4* 3.3* 3.2*   BILITOT 1.4* 1.3* 1.1   ALKPHOS 68 69 66   ALT 16 16 15   AST 28 24 20       Assessment:    1. Severe gallstone pancreatitis. Slight improvement over the past 24 hours. 2.  Will likely be several more days before he can tolerate oral intake.   May benefit from TPN but no access at present and PICC team not available until Monday, 3/7/2022.  3.  Dehydration secondary to #1    Plan:    1. Continue current management. 2.  I encouraged him to sit in his bedside chair and walk in the hallway with help from the staff as needed. 3.  Plan for interval cholecystectomy as an outpatient once his pancreatitis has resolved and he has fully recovered.

## 2022-03-06 LAB
ALBUMIN SERPL-MCNC: 3.2 G/DL (ref 3.5–5.2)
ALP BLD-CCNC: 70 U/L (ref 40–130)
ALT SERPL-CCNC: 17 U/L (ref 5–41)
ANION GAP SERPL CALCULATED.3IONS-SCNC: 14 MMOL/L (ref 7–19)
AST SERPL-CCNC: 24 U/L (ref 5–40)
BASOPHILS ABSOLUTE: 0 K/UL (ref 0–0.2)
BASOPHILS RELATIVE PERCENT: 0.2 % (ref 0–1)
BILIRUB SERPL-MCNC: 1.4 MG/DL (ref 0.2–1.2)
BLOOD CULTURE, ROUTINE: NORMAL
BLOOD CULTURE, ROUTINE: NORMAL
BUN BLDV-MCNC: 24 MG/DL (ref 8–23)
CALCIUM SERPL-MCNC: 7.3 MG/DL (ref 8.8–10.2)
CHLORIDE BLD-SCNC: 102 MMOL/L (ref 98–111)
CO2: 24 MMOL/L (ref 22–29)
CREAT SERPL-MCNC: 0.9 MG/DL (ref 0.5–1.2)
CULTURE, BLOOD 2: NORMAL
CULTURE, BLOOD 2: NORMAL
EOSINOPHILS ABSOLUTE: 0 K/UL (ref 0–0.6)
EOSINOPHILS RELATIVE PERCENT: 0.3 % (ref 0–5)
GFR AFRICAN AMERICAN: >59
GFR NON-AFRICAN AMERICAN: >60
GLUCOSE BLD-MCNC: 156 MG/DL (ref 74–109)
HCT VFR BLD CALC: 37.3 % (ref 42–52)
HEMOGLOBIN: 12.4 G/DL (ref 14–18)
IMMATURE GRANULOCYTES #: 0.2 K/UL
LIPASE: 31 U/L (ref 13–60)
LYMPHOCYTES ABSOLUTE: 1.3 K/UL (ref 1.1–4.5)
LYMPHOCYTES RELATIVE PERCENT: 9.1 % (ref 20–40)
MAGNESIUM: 2.7 MG/DL (ref 1.6–2.4)
MCH RBC QN AUTO: 30.9 PG (ref 27–31)
MCHC RBC AUTO-ENTMCNC: 33.2 G/DL (ref 33–37)
MCV RBC AUTO: 93 FL (ref 80–94)
MONOCYTES ABSOLUTE: 1.8 K/UL (ref 0–0.9)
MONOCYTES RELATIVE PERCENT: 12.4 % (ref 0–10)
NEUTROPHILS ABSOLUTE: 11.2 K/UL (ref 1.5–7.5)
NEUTROPHILS RELATIVE PERCENT: 76.6 % (ref 50–65)
PDW BLD-RTO: 14.4 % (ref 11.5–14.5)
PLATELET # BLD: 193 K/UL (ref 130–400)
PMV BLD AUTO: 9.8 FL (ref 9.4–12.4)
POTASSIUM REFLEX MAGNESIUM: 3.4 MMOL/L (ref 3.5–5)
RBC # BLD: 4.01 M/UL (ref 4.7–6.1)
SODIUM BLD-SCNC: 140 MMOL/L (ref 136–145)
TOTAL PROTEIN: 5.2 G/DL (ref 6.6–8.7)
WBC # BLD: 14.6 K/UL (ref 4.8–10.8)

## 2022-03-06 PROCEDURE — 6360000002 HC RX W HCPCS: Performed by: STUDENT IN AN ORGANIZED HEALTH CARE EDUCATION/TRAINING PROGRAM

## 2022-03-06 PROCEDURE — 2500000003 HC RX 250 WO HCPCS: Performed by: INTERNAL MEDICINE

## 2022-03-06 PROCEDURE — 85025 COMPLETE CBC W/AUTO DIFF WBC: CPT

## 2022-03-06 PROCEDURE — 83735 ASSAY OF MAGNESIUM: CPT

## 2022-03-06 PROCEDURE — 2500000003 HC RX 250 WO HCPCS: Performed by: STUDENT IN AN ORGANIZED HEALTH CARE EDUCATION/TRAINING PROGRAM

## 2022-03-06 PROCEDURE — 80053 COMPREHEN METABOLIC PANEL: CPT

## 2022-03-06 PROCEDURE — 6370000000 HC RX 637 (ALT 250 FOR IP): Performed by: INTERNAL MEDICINE

## 2022-03-06 PROCEDURE — 36415 COLL VENOUS BLD VENIPUNCTURE: CPT

## 2022-03-06 PROCEDURE — 99231 SBSQ HOSP IP/OBS SF/LOW 25: CPT | Performed by: SURGERY

## 2022-03-06 PROCEDURE — 83690 ASSAY OF LIPASE: CPT

## 2022-03-06 PROCEDURE — 6360000002 HC RX W HCPCS: Performed by: INTERNAL MEDICINE

## 2022-03-06 PROCEDURE — 1210000000 HC MED SURG R&B

## 2022-03-06 PROCEDURE — 6370000000 HC RX 637 (ALT 250 FOR IP): Performed by: STUDENT IN AN ORGANIZED HEALTH CARE EDUCATION/TRAINING PROGRAM

## 2022-03-06 PROCEDURE — 2580000003 HC RX 258: Performed by: INTERNAL MEDICINE

## 2022-03-06 RX ADMIN — HYDROMORPHONE HYDROCHLORIDE 0.5 MG: 1 INJECTION, SOLUTION INTRAMUSCULAR; INTRAVENOUS; SUBCUTANEOUS at 03:21

## 2022-03-06 RX ADMIN — PIPERACILLIN AND TAZOBACTAM 3375 MG: 3; .375 INJECTION, POWDER, LYOPHILIZED, FOR SOLUTION INTRAVENOUS at 17:16

## 2022-03-06 RX ADMIN — OXYCODONE AND ACETAMINOPHEN 1 TABLET: 10; 325 TABLET ORAL at 06:04

## 2022-03-06 RX ADMIN — HYDROMORPHONE HYDROCHLORIDE 0.5 MG: 1 INJECTION, SOLUTION INTRAMUSCULAR; INTRAVENOUS; SUBCUTANEOUS at 08:09

## 2022-03-06 RX ADMIN — OXYCODONE AND ACETAMINOPHEN 1 TABLET: 10; 325 TABLET ORAL at 01:32

## 2022-03-06 RX ADMIN — OXYCODONE AND ACETAMINOPHEN 1 TABLET: 10; 325 TABLET ORAL at 10:47

## 2022-03-06 RX ADMIN — OXYCODONE AND ACETAMINOPHEN 1 TABLET: 10; 325 TABLET ORAL at 14:00

## 2022-03-06 RX ADMIN — SODIUM CHLORIDE, PRESERVATIVE FREE 10 ML: 5 INJECTION INTRAVENOUS at 20:13

## 2022-03-06 RX ADMIN — PIPERACILLIN AND TAZOBACTAM 3375 MG: 3; .375 INJECTION, POWDER, LYOPHILIZED, FOR SOLUTION INTRAVENOUS at 08:09

## 2022-03-06 RX ADMIN — SODIUM CHLORIDE, PRESERVATIVE FREE 10 ML: 5 INJECTION INTRAVENOUS at 08:09

## 2022-03-06 RX ADMIN — CETIRIZINE HYDROCHLORIDE 10 MG: 10 TABLET, FILM COATED ORAL at 08:08

## 2022-03-06 RX ADMIN — HYDROMORPHONE HYDROCHLORIDE 0.5 MG: 1 INJECTION, SOLUTION INTRAMUSCULAR; INTRAVENOUS; SUBCUTANEOUS at 16:01

## 2022-03-06 RX ADMIN — TAMSULOSIN HYDROCHLORIDE 0.4 MG: 0.4 CAPSULE ORAL at 08:08

## 2022-03-06 RX ADMIN — POLYETHYLENE GLYCOL 3350 17 G: 17 POWDER, FOR SOLUTION ORAL at 08:08

## 2022-03-06 RX ADMIN — FAMOTIDINE 20 MG: 10 INJECTION, SOLUTION INTRAVENOUS at 08:09

## 2022-03-06 RX ADMIN — OXYCODONE AND ACETAMINOPHEN 1 TABLET: 10; 325 TABLET ORAL at 23:09

## 2022-03-06 RX ADMIN — HYDROMORPHONE HYDROCHLORIDE 0.5 MG: 1 INJECTION, SOLUTION INTRAMUSCULAR; INTRAVENOUS; SUBCUTANEOUS at 20:06

## 2022-03-06 RX ADMIN — OXYCODONE AND ACETAMINOPHEN 1 TABLET: 10; 325 TABLET ORAL at 18:08

## 2022-03-06 RX ADMIN — PIPERACILLIN AND TAZOBACTAM 3375 MG: 3; .375 INJECTION, POWDER, LYOPHILIZED, FOR SOLUTION INTRAVENOUS at 01:35

## 2022-03-06 RX ADMIN — POTASSIUM CHLORIDE, DEXTROSE MONOHYDRATE AND SODIUM CHLORIDE: 150; 5; 900 INJECTION, SOLUTION INTRAVENOUS at 03:23

## 2022-03-06 RX ADMIN — FAMOTIDINE 20 MG: 10 INJECTION, SOLUTION INTRAVENOUS at 20:08

## 2022-03-06 RX ADMIN — DOCUSATE SODIUM 100 MG: 100 CAPSULE, LIQUID FILLED ORAL at 08:08

## 2022-03-06 RX ADMIN — ENOXAPARIN SODIUM 40 MG: 100 INJECTION SUBCUTANEOUS at 08:08

## 2022-03-06 RX ADMIN — HYDROMORPHONE HYDROCHLORIDE 0.5 MG: 1 INJECTION, SOLUTION INTRAMUSCULAR; INTRAVENOUS; SUBCUTANEOUS at 12:02

## 2022-03-06 ASSESSMENT — PAIN SCALES - GENERAL
PAINLEVEL_OUTOF10: 4
PAINLEVEL_OUTOF10: 5
PAINLEVEL_OUTOF10: 7
PAINLEVEL_OUTOF10: 6
PAINLEVEL_OUTOF10: 5
PAINLEVEL_OUTOF10: 6
PAINLEVEL_OUTOF10: 4
PAINLEVEL_OUTOF10: 3
PAINLEVEL_OUTOF10: 0
PAINLEVEL_OUTOF10: 4
PAINLEVEL_OUTOF10: 4
PAINLEVEL_OUTOF10: 3
PAINLEVEL_OUTOF10: 4

## 2022-03-06 ASSESSMENT — PAIN DESCRIPTION - LOCATION
LOCATION: BACK
LOCATION: ABDOMEN;BACK

## 2022-03-06 ASSESSMENT — PAIN DESCRIPTION - ORIENTATION: ORIENTATION: UPPER

## 2022-03-06 NOTE — PROGRESS NOTES
Patient expresses that he is having severe pain in his back from laying in bed. States pain is well controlled when pain medication is alternated every 2 hours. No pain currently in abdomen. Dr. Philomena Lopez states patient may have NG clamped at times so patient may ambulate to relieve back pain. NG clamped at 0802. No nausea or vomiting since being clamped.  Electronically signed by Julienne Santo RN on 3/6/2022 at 11:22 AM

## 2022-03-06 NOTE — PLAN OF CARE
Problem: Nutrition  Goal: Optimal nutrition therapy  3/5/2022 2239 by Theron Valerio RN  Outcome: Ongoing  3/5/2022 0921 by Cheryl Arredondo, MS, RD, LD  Outcome: Ongoing     Problem: Coping:  Goal: Ability to verbalize feelings will improve  Description: Ability to verbalize feelings will improve  Outcome: Ongoing  Goal: Level of anxiety will decrease  Description: Level of anxiety will decrease  Outcome: Ongoing     Problem: Fluid Volume:  Goal: Will maintain adequate fluid volume  Description: Will maintain adequate fluid volume  Outcome: Ongoing     Problem: Health Behavior:  Goal: Ability to identify changes in lifestyle to reduce recurrence of condition will improve  Description: Ability to identify changes in lifestyle to reduce recurrence of condition will improve  Outcome: Ongoing     Problem: Nutritional:  Goal: Ability to achieve adequate nutritional intake will improve  Description: Ability to achieve adequate nutritional intake will improve  Outcome: Ongoing     Problem: Physical Regulation:  Goal: Complications related to the disease process, condition or treatment will be avoided or minimized  Description: Complications related to the disease process, condition or treatment will be avoided or minimized  Outcome: Ongoing  Goal: Hemodynamic stability will improve  Description: Hemodynamic stability will improve  Outcome: Ongoing     Problem: Respiratory:  Goal: Ability to achieve and maintain a regular respiratory rate will improve  Description: Ability to achieve and maintain a regular respiratory rate will improve  Outcome: Ongoing     Problem: Sensory:  Goal: General experience of comfort will improve  Description: General experience of comfort will improve  Outcome: Ongoing     Problem: Skin Integrity:  Goal: Skin integrity will be maintained  Description: Skin integrity will be maintained  Outcome: Ongoing

## 2022-03-06 NOTE — PROGRESS NOTES
Daily Progress Note    Date:3/6/2022  Patient: Kingsley Albert  : 1950  Joby Joe MD    Admit Date: 2022  7:24 PM   LOS: 6 days       Subjective: Patient seen and evaluated this morning. Was standing walking with NG tube clamped. He states he has had some episodes of flatus but no bowel movement. Still has abdominal distention with some slight improvement. He states the abdominal pain is almost resolved. No fever, nausea vomiting reported. Summary: 24-year-old male with metastatic renal cell carcinoma followed at [facility], presented with severe epigastric and right upper quadrant pain progressively worsening over the past few days with radiation to the back, with lipase over 3000 and CT abdomen findings consistent with acute pancreatitis, also noted to have cholelithiasis with pericholecystic inflammation present concerning for cholecystitis. Findings concerning for gallstone pancreatitis. Patient maintained on IV fluids with pain control. General surgery consulted to evaluate for potential cholecystectomy. Review of Systems    Comprehensive ROS completed and is negative except as otherwise noted        Objective:      Vital signs in last 24 hours:  Patient Vitals for the past 24 hrs:   BP Temp Temp src Pulse Resp SpO2   22 0637 (!) 156/72 98.2 °F (36.8 °C) Temporal 107 -- 92 %   22 2101 (!) 145/62 99.1 °F (37.3 °C) Temporal 116 17 92 %   22 1555 (!) 160/78 97.9 °F (36.6 °C) Temporal 101 16 93 %     Physical exam    General: Resting in no acute distress  Psych: Normal mood and affect  HEENT: NC/AT, no scleral icterus. NG tube in place   Cardiovascular: Regular rhythm, pulses 2+ and equal  Respiratory: Clear to auscultation bilaterally, no wheezes  Abdomen: Mild epigastric tenderness noted, abdomen distended distant tympanic bowel sounds present.   Neurologic: Nonfocal exam, normal speech  Extremities: No clubbing cyanosis or edema  Musculoskeletal: No deformities appreciated  Skin: Warm and dry, non-jaundiced    Lab Review   Recent Results (from the past 24 hour(s))   CBC with Auto Differential    Collection Time: 03/06/22  2:24 AM   Result Value Ref Range    WBC 14.6 (H) 4.8 - 10.8 K/uL    RBC 4.01 (L) 4.70 - 6.10 M/uL    Hemoglobin 12.4 (L) 14.0 - 18.0 g/dL    Hematocrit 37.3 (L) 42.0 - 52.0 %    MCV 93.0 80.0 - 94.0 fL    MCH 30.9 27.0 - 31.0 pg    MCHC 33.2 33.0 - 37.0 g/dL    RDW 14.4 11.5 - 14.5 %    Platelets 189 787 - 583 K/uL    MPV 9.8 9.4 - 12.4 fL    Neutrophils % 76.6 (H) 50.0 - 65.0 %    Lymphocytes % 9.1 (L) 20.0 - 40.0 %    Monocytes % 12.4 (H) 0.0 - 10.0 %    Eosinophils % 0.3 0.0 - 5.0 %    Basophils % 0.2 0.0 - 1.0 %    Neutrophils Absolute 11.2 (H) 1.5 - 7.5 K/uL    Immature Granulocytes # 0.2 K/uL    Lymphocytes Absolute 1.3 1.1 - 4.5 K/uL    Monocytes Absolute 1.80 (H) 0.00 - 0.90 K/uL    Eosinophils Absolute 0.00 0.00 - 0.60 K/uL    Basophils Absolute 0.00 0.00 - 0.20 K/uL   Comprehensive Metabolic Panel w/ Reflex to MG    Collection Time: 03/06/22  2:24 AM   Result Value Ref Range    Sodium 140 136 - 145 mmol/L    Potassium reflex Magnesium 3.4 (L) 3.5 - 5.0 mmol/L    Chloride 102 98 - 111 mmol/L    CO2 24 22 - 29 mmol/L    Anion Gap 14 7 - 19 mmol/L    Glucose 156 (H) 74 - 109 mg/dL    BUN 24 (H) 8 - 23 mg/dL    CREATININE 0.9 0.5 - 1.2 mg/dL    GFR Non-African American >60 >60    GFR African American >59 >59    Calcium 7.3 (L) 8.8 - 10.2 mg/dL    Total Protein 5.2 (L) 6.6 - 8.7 g/dL    Albumin 3.2 (L) 3.5 - 5.2 g/dL    Total Bilirubin 1.4 (H) 0.2 - 1.2 mg/dL    Alkaline Phosphatase 70 40 - 130 U/L    ALT 17 5 - 41 U/L    AST 24 5 - 40 U/L   Lipase    Collection Time: 03/06/22  2:24 AM   Result Value Ref Range    Lipase 31 13 - 60 U/L   Magnesium    Collection Time: 03/06/22  2:24 AM   Result Value Ref Range    Magnesium 2.7 (H) 1.6 - 2.4 mg/dL       I/O last 3 completed shifts:   In: 3937.5 [P.O.:2060; I.V.:1296.4; IV Piggyback:581.1]  Out: 5150 [Urine:1550; Emesis/NG output:3600]  No intake/output data recorded.       Current Facility-Administered Medications:     dextrose 5 % and 0.9 % NaCl with KCl 20 mEq infusion, , IntraVENous, Continuous, Josefina Cross MD, Last Rate: 100 mL/hr at 03/06/22 0604, Rate Verify at 03/06/22 0604    bisacodyl (DULCOLAX) suppository 10 mg, 10 mg, Rectal, Daily PRN, Josefina Cross MD    piperacillin-tazobactam (ZOSYN) 3,375 mg in dextrose 5 % 50 mL IVPB extended infusion (mini-bag), 3,375 mg, IntraVENous, Q8H, Josefina Cross MD, Last Rate: 12.5 mL/hr at 03/06/22 0809, 3,375 mg at 03/06/22 0809    tamsulosin (FLOMAX) capsule 0.4 mg, 0.4 mg, Oral, Daily, Josefina Cross MD, 0.4 mg at 03/06/22 0808    polyethylene glycol (GLYCOLAX) packet 17 g, 17 g, Oral, Daily, Josefina Cross MD, 17 g at 03/06/22 0808    docusate sodium (COLACE) capsule 100 mg, 100 mg, Oral, Daily, Josefina Cross MD, 100 mg at 03/06/22 0808    oxyCODONE-acetaminophen (PERCOCET)  MG per tablet 1 tablet, 1 tablet, Oral, Q4H PRN, Patience DO Abbe, 1 tablet at 03/06/22 0604    HYDROmorphone HCl PF (DILAUDID) injection 0.5 mg, 0.5 mg, IntraVENous, Q4H PRN, Jessi Mckeon MD, 0.5 mg at 03/06/22 0809    [Held by provider] famotidine (PEPCID) tablet 20 mg, 20 mg, Oral, Daily, Jessi Mckeon MD    cetirizine (ZYRTEC) tablet 10 mg, 10 mg, Oral, Daily, Jessi Mckeon MD, 10 mg at 03/06/22 0808    famotidine (PEPCID) injection 20 mg, 20 mg, IntraVENous, BID, Jessi Mckeon MD, 20 mg at 03/06/22 0809    sodium chloride flush 0.9 % injection 5-40 mL, 5-40 mL, IntraVENous, 2 times per day, Patience Abbe, DO, 10 mL at 03/06/22 0809    sodium chloride flush 0.9 % injection 5-40 mL, 5-40 mL, IntraVENous, PRN, Patience Abbe, DO    enoxaparin (LOVENOX) injection 40 mg, 40 mg, SubCUTAneous, Daily, Patience Abbe, DO, 40 mg at 03/06/22 0808    ondansetron (ZOFRAN-ODT) disintegrating tablet 4 mg, 4 mg, Oral, Q8H PRN **OR** ondansetron (ZOFRAN) injection 4 mg, 4 mg, IntraVENous, Q6H PRN, Patience Abbe, DO, 4 mg at 03/03/22 1152    polyethylene glycol (GLYCOLAX) packet 17 g, 17 g, Oral, Daily PRN, Patience Abbe, DO    acetaminophen (TYLENOL) tablet 650 mg, 650 mg, Oral, Q6H PRN **OR** acetaminophen (TYLENOL) suppository 650 mg, 650 mg, Rectal, Q6H PRN, Patience Abbe, DO        Assessment/plan  Principal Problem:    Pancreatitis, gallstone  Active Problems:    Acute pancreatitis    Cholelithiasis    Dehydration    Abdominal pain  Resolved Problems:    * No resolved hospital problems. *      Acute pancreatitis, suspect gallstone pancreatitis with cholelithiasis  Pericholecystic inflammation noted on imaging  CT abdomen and gallbladder ultrasound reviewed  General surgery consult follow-up and reevaluation appreciated. Plan for possible cholecystectomy when more stable. Continue IV fluid resuscitation, cautiously as patient seems to require oxygen likely due to fluid overload. Will continue with IV fluids for hydration. Pain control   N.p.o. except sips of meds  Triglycerides 49 and lipase 31. Nutrition assessment suggesting TPN as alternative means of nutrition. as patient continues to remain n.p.o., to consider short-term TPN 4.25/10 @ 90ml/hr, when able to obtain PICC line. Unless surgical service suggests otherwise. Abdominal distention  KUB showing nonspecific gas pattern with dilated small bowel and air throughout most of the colon. No air in the rectum. CT abdomen pelvis to rule out small bowel or large bowel obstruction was suggestive of ileus versus partial bowel obstruction. .  Plan n.p.o. with IV fluids for hydration. KUB suggestive of nonspecific gas patterns. This was followed up with CT abdomen pelvis that showed probable ileus versus partial large bowel obstruction. NG tube placed to low intermittent wall suctioning while in bed. Encourage ambulation and out of bed to chair.   Surgical follow-up and re-evaluation appreciated, will follow further recommendations. Leukocytosis, improved  WBC 14.6  No evidence of pancreatic necrosis or other clear infectious source  Patient spiked a fever overnight. Will cont Zosyn to de-escalate within the next 24 to 48 hours. DVT prophylaxis with Lovenox    GI prophylaxis, Pepcid    Plan of care to be adjusted pending response from treatment and further evaluation.     Brian Leon MD 3/6/2022 9:42 AM

## 2022-03-06 NOTE — PROGRESS NOTES
[facility] General Surgery    Progress Note        Subjective:    Resting in bed. Pain medication was adjusted yesterday and with this his back pain is much improved. Tolerating ice chips, sips of water and occasional popsicles. Passing flatus but no bowel movement. Objective:    Vitals:    03/05/22 1555 03/05/22 2101 03/06/22 0637 03/06/22 1105   BP: (!) 160/78 (!) 145/62 (!) 156/72 (!) 149/77   Pulse: 101 116 107 111   Resp: 16 17  18   Temp: 97.9 °F (36.6 °C) 99.1 °F (37.3 °C) 98.2 °F (36.8 °C) 98.6 °F (37 °C)   TempSrc: Temporal Temporal Temporal Temporal   SpO2: 93% 92% 92% 93%   Weight:       Height:         I/O last 3 completed shifts: In: 3937.5 [P.O.:2060; I.V.:1296.4; IV Piggyback:581.1]  Out: 6250 [Urine:1550; Emesis/NG output:4700]     Abdomen is distended. Diffuse tenderness on palpation. No mass. Bowel sounds hypoactive. LABS:   CBC:   Recent Labs     03/04/22 0204 03/05/22 0343 03/06/22 0224   WBC 14.9* 15.4* 14.6*   RBC 4.52* 4.26* 4.01*   HGB 13.7* 12.9* 12.4*   HCT 43.0 40.1* 37.3*    182 193   LYMPHOPCT 6.7* 5.6* 9.1*   MONOPCT 9.9 10.2* 12.4*   BASOPCT 0.3 0.2 0.2   MONOSABS 1.50* 1.60* 1.80*   LYMPHSABS 1.0* 0.9* 1.3   EOSABS 0.00 0.00 0.00   BASOSABS 0.00 0.00 0.00      BMP:   Recent Labs     03/04/22 0204 03/05/22 0343 03/06/22 0224    141 140   K 3.9 3.7 3.4*   CL 99 101 102   CO2 23 26 24   ANIONGAP 16 14 14   GLUCOSE 142* 170* 156*   CREATININE 1.0 1.0 0.9   LABGLOM >60 >60 >60   CALCIUM 7.6* 8.2* 7.3*     LFT:   Recent Labs     03/04/22  0204 03/05/22  0343 03/06/22  0224   PROT 5.2* 5.4* 5.2*   LABALBU 3.3* 3.2* 3.2*   BILITOT 1.3* 1.1 1.4*   ALKPHOS 69 66 70   ALT 16 15 17   AST 24 20 24       Assessment:    1. Severe gallstone pancreatitis. He seems slightly improved from yesterday. 2.  Remote history of metastatic renal cell carcinoma. Plan:    1.   Continue with current supportive care with allowing his pancreatitis to resolve. 2.  May benefit from placement of a PICC tomorrow is that he may begin TPN as it is unlikely that he will be able to eat for a while longer. 3.  Dr. Joselito Aponte will resume his care tomorrow morning.

## 2022-03-07 LAB
ALBUMIN SERPL-MCNC: 2.9 G/DL (ref 3.5–5.2)
ALP BLD-CCNC: 64 U/L (ref 40–130)
ALT SERPL-CCNC: 17 U/L (ref 5–41)
ANION GAP SERPL CALCULATED.3IONS-SCNC: 12 MMOL/L (ref 7–19)
AST SERPL-CCNC: 23 U/L (ref 5–40)
BASOPHILS ABSOLUTE: 0 K/UL (ref 0–0.2)
BASOPHILS RELATIVE PERCENT: 0.2 % (ref 0–1)
BILIRUB SERPL-MCNC: 1.2 MG/DL (ref 0.2–1.2)
BUN BLDV-MCNC: 15 MG/DL (ref 8–23)
CALCIUM SERPL-MCNC: 7.3 MG/DL (ref 8.8–10.2)
CHLORIDE BLD-SCNC: 103 MMOL/L (ref 98–111)
CO2: 25 MMOL/L (ref 22–29)
CREAT SERPL-MCNC: 0.8 MG/DL (ref 0.5–1.2)
EOSINOPHILS ABSOLUTE: 0.1 K/UL (ref 0–0.6)
EOSINOPHILS RELATIVE PERCENT: 0.7 % (ref 0–5)
GFR AFRICAN AMERICAN: >59
GFR NON-AFRICAN AMERICAN: >60
GLUCOSE BLD-MCNC: 152 MG/DL (ref 74–109)
HCT VFR BLD CALC: 35.6 % (ref 42–52)
HEMOGLOBIN: 11.8 G/DL (ref 14–18)
IMMATURE GRANULOCYTES #: 0.2 K/UL
LIPASE: 26 U/L (ref 13–60)
LYMPHOCYTES ABSOLUTE: 1.2 K/UL (ref 1.1–4.5)
LYMPHOCYTES RELATIVE PERCENT: 8.2 % (ref 20–40)
MAGNESIUM: 2.5 MG/DL (ref 1.6–2.4)
MCH RBC QN AUTO: 30.6 PG (ref 27–31)
MCHC RBC AUTO-ENTMCNC: 33.1 G/DL (ref 33–37)
MCV RBC AUTO: 92.2 FL (ref 80–94)
MONOCYTES ABSOLUTE: 1.6 K/UL (ref 0–0.9)
MONOCYTES RELATIVE PERCENT: 11.3 % (ref 0–10)
NEUTROPHILS ABSOLUTE: 11.3 K/UL (ref 1.5–7.5)
NEUTROPHILS RELATIVE PERCENT: 78.2 % (ref 50–65)
PDW BLD-RTO: 14.6 % (ref 11.5–14.5)
PLATELET # BLD: 189 K/UL (ref 130–400)
PMV BLD AUTO: 10.1 FL (ref 9.4–12.4)
POTASSIUM REFLEX MAGNESIUM: 3.3 MMOL/L (ref 3.5–5)
RBC # BLD: 3.86 M/UL (ref 4.7–6.1)
SODIUM BLD-SCNC: 140 MMOL/L (ref 136–145)
TOTAL PROTEIN: 4.8 G/DL (ref 6.6–8.7)
WBC # BLD: 14.5 K/UL (ref 4.8–10.8)

## 2022-03-07 PROCEDURE — 1210000000 HC MED SURG R&B

## 2022-03-07 PROCEDURE — 6370000000 HC RX 637 (ALT 250 FOR IP): Performed by: INTERNAL MEDICINE

## 2022-03-07 PROCEDURE — 6360000002 HC RX W HCPCS: Performed by: INTERNAL MEDICINE

## 2022-03-07 PROCEDURE — 83690 ASSAY OF LIPASE: CPT

## 2022-03-07 PROCEDURE — 2580000003 HC RX 258: Performed by: INTERNAL MEDICINE

## 2022-03-07 PROCEDURE — 80053 COMPREHEN METABOLIC PANEL: CPT

## 2022-03-07 PROCEDURE — 83735 ASSAY OF MAGNESIUM: CPT

## 2022-03-07 PROCEDURE — 85025 COMPLETE CBC W/AUTO DIFF WBC: CPT

## 2022-03-07 PROCEDURE — 99233 SBSQ HOSP IP/OBS HIGH 50: CPT | Performed by: SURGERY

## 2022-03-07 PROCEDURE — 36415 COLL VENOUS BLD VENIPUNCTURE: CPT

## 2022-03-07 PROCEDURE — 6370000000 HC RX 637 (ALT 250 FOR IP): Performed by: STUDENT IN AN ORGANIZED HEALTH CARE EDUCATION/TRAINING PROGRAM

## 2022-03-07 PROCEDURE — 6360000002 HC RX W HCPCS: Performed by: STUDENT IN AN ORGANIZED HEALTH CARE EDUCATION/TRAINING PROGRAM

## 2022-03-07 PROCEDURE — 2500000003 HC RX 250 WO HCPCS: Performed by: STUDENT IN AN ORGANIZED HEALTH CARE EDUCATION/TRAINING PROGRAM

## 2022-03-07 PROCEDURE — 2500000003 HC RX 250 WO HCPCS: Performed by: INTERNAL MEDICINE

## 2022-03-07 RX ORDER — POTASSIUM CHLORIDE 7.45 MG/ML
10 INJECTION INTRAVENOUS PRN
Status: DISCONTINUED | OUTPATIENT
Start: 2022-03-07 | End: 2022-03-12 | Stop reason: HOSPADM

## 2022-03-07 RX ADMIN — ENOXAPARIN SODIUM 40 MG: 100 INJECTION SUBCUTANEOUS at 08:48

## 2022-03-07 RX ADMIN — POTASSIUM CHLORIDE, DEXTROSE MONOHYDRATE AND SODIUM CHLORIDE: 150; 5; 900 INJECTION, SOLUTION INTRAVENOUS at 05:29

## 2022-03-07 RX ADMIN — POTASSIUM CHLORIDE, DEXTROSE MONOHYDRATE AND SODIUM CHLORIDE: 150; 5; 900 INJECTION, SOLUTION INTRAVENOUS at 17:35

## 2022-03-07 RX ADMIN — POTASSIUM CHLORIDE 10 MEQ: 7.46 INJECTION, SOLUTION INTRAVENOUS at 09:08

## 2022-03-07 RX ADMIN — CETIRIZINE HYDROCHLORIDE 10 MG: 10 TABLET, FILM COATED ORAL at 09:14

## 2022-03-07 RX ADMIN — FAMOTIDINE 20 MG: 10 INJECTION, SOLUTION INTRAVENOUS at 20:27

## 2022-03-07 RX ADMIN — POTASSIUM CHLORIDE 10 MEQ: 7.46 INJECTION, SOLUTION INTRAVENOUS at 11:31

## 2022-03-07 RX ADMIN — TAMSULOSIN HYDROCHLORIDE 0.4 MG: 0.4 CAPSULE ORAL at 09:14

## 2022-03-07 RX ADMIN — POTASSIUM CHLORIDE 10 MEQ: 7.46 INJECTION, SOLUTION INTRAVENOUS at 12:37

## 2022-03-07 RX ADMIN — OXYCODONE AND ACETAMINOPHEN 1 TABLET: 10; 325 TABLET ORAL at 09:19

## 2022-03-07 RX ADMIN — HYDROMORPHONE HYDROCHLORIDE 0.5 MG: 1 INJECTION, SOLUTION INTRAMUSCULAR; INTRAVENOUS; SUBCUTANEOUS at 01:30

## 2022-03-07 RX ADMIN — SODIUM CHLORIDE, PRESERVATIVE FREE 10 ML: 5 INJECTION INTRAVENOUS at 20:28

## 2022-03-07 RX ADMIN — POTASSIUM CHLORIDE 10 MEQ: 7.46 INJECTION, SOLUTION INTRAVENOUS at 13:44

## 2022-03-07 RX ADMIN — FAMOTIDINE 20 MG: 10 INJECTION, SOLUTION INTRAVENOUS at 08:48

## 2022-03-07 RX ADMIN — OXYCODONE AND ACETAMINOPHEN 1 TABLET: 10; 325 TABLET ORAL at 05:32

## 2022-03-07 RX ADMIN — PIPERACILLIN AND TAZOBACTAM 3375 MG: 3; .375 INJECTION, POWDER, LYOPHILIZED, FOR SOLUTION INTRAVENOUS at 01:30

## 2022-03-07 RX ADMIN — OXYCODONE AND ACETAMINOPHEN 1 TABLET: 10; 325 TABLET ORAL at 13:57

## 2022-03-07 ASSESSMENT — PAIN DESCRIPTION - LOCATION
LOCATION: ABDOMEN;BACK

## 2022-03-07 ASSESSMENT — PAIN SCALES - GENERAL
PAINLEVEL_OUTOF10: 4
PAINLEVEL_OUTOF10: 3
PAINLEVEL_OUTOF10: 7
PAINLEVEL_OUTOF10: 0
PAINLEVEL_OUTOF10: 7
PAINLEVEL_OUTOF10: 3
PAINLEVEL_OUTOF10: 4

## 2022-03-07 ASSESSMENT — PAIN DESCRIPTION - PAIN TYPE: TYPE: CHRONIC PAIN

## 2022-03-07 NOTE — PROGRESS NOTES
[facility] General Surgery Progress Note    Chief Complaint:   Chief Complaint   Patient presents with    Abdominal Pain     all over but worse in LRQ       SUBJECTIVE:  Mr. COLES [facility] is a 73 y.o. male is seen and examined at bedside. Distention improved. Denies nausea or vomiting. OBJECTIVE:  /64   Pulse 109   Temp 97.5 °F (36.4 °C) (Temporal)   Resp 20   Ht 6' 1\" (1.854 m)   Wt 210 lb 14.4 oz (95.7 kg)   SpO2 93%   BMI 27.82 kg/m²   CONSTITUTIONAL: Alert, appropriate, no acute distress  SKIN: warm, dry with no rashes or lesions  HEENT: NCAT, Non icteric, PERR. Trachea Midline. NG in place  CHEST/LUNGS: CTA bilaterally. Normal respiratory effort. CARDIOVASCULAR: RRR, No edema. ABDOMEN: Distended, TTP much improved  NEUROLOGIC: CN II-XI grossly intact, no motor or sensory deficits   MUSCULOSKELETAL: No clubbing or cyanosis. PSYCHIATRIC:  Normal Mood and Affect. Alert and Oriented. Labs:  CBC:   Recent Labs     03/05/22 0343 03/06/22 0224 03/07/22  0429   WBC 15.4* 14.6* 14.5*   HGB 12.9* 12.4* 11.8*    193 189     BMP:    Recent Labs     03/05/22 0343 03/06/22 0224 03/07/22  0429    140 140   K 3.7 3.4* 3.3*    102 103   CO2 26 24 25   BUN 33* 24* 15   CREATININE 1.0 0.9 0.8   GLUCOSE 170* 156* 152*       ASSESSMENT:  Principal Problem:    Pancreatitis, gallstone  Active Problems:    Acute pancreatitis    Cholelithiasis    Dehydration    Abdominal pain  Resolved Problems:    * No resolved hospital problems.  *      PLAN:    3:1 clamp:Suction of the NGT  Consider PICC line and TPN  Cholecystectomy as an OP when stabilized from his Pancreatitis   Pain and nausea control      Blaise Merritt DO   Electronically Signed on 3/7/2022 at 10:28 AM

## 2022-03-07 NOTE — PLAN OF CARE
Problem: Nutrition  Goal: Optimal nutrition therapy  Outcome: Ongoing     Problem: Coping:  Goal: Ability to verbalize feelings will improve  Description: Ability to verbalize feelings will improve  Outcome: Ongoing  Goal: Level of anxiety will decrease  Description: Level of anxiety will decrease  Outcome: Ongoing     Problem: Fluid Volume:  Goal: Will maintain adequate fluid volume  Description: Will maintain adequate fluid volume  Outcome: Ongoing     Problem: Health Behavior:  Goal: Ability to identify changes in lifestyle to reduce recurrence of condition will improve  Description: Ability to identify changes in lifestyle to reduce recurrence of condition will improve  Outcome: Ongoing     Problem: Nutritional:  Goal: Ability to achieve adequate nutritional intake will improve  Description: Ability to achieve adequate nutritional intake will improve  Outcome: Ongoing     Problem: Physical Regulation:  Goal: Complications related to the disease process, condition or treatment will be avoided or minimized  Description: Complications related to the disease process, condition or treatment will be avoided or minimized  Outcome: Ongoing  Goal: Hemodynamic stability will improve  Description: Hemodynamic stability will improve  Outcome: Ongoing     Problem: Respiratory:  Goal: Ability to achieve and maintain a regular respiratory rate will improve  Description: Ability to achieve and maintain a regular respiratory rate will improve  Outcome: Ongoing     Problem: Sensory:  Goal: General experience of comfort will improve  Description: General experience of comfort will improve  Outcome: Ongoing     Problem: Skin Integrity:  Goal: Skin integrity will be maintained  Description: Skin integrity will be maintained  Outcome: Ongoing     Problem: Pain:  Description: Pain management should include both nonpharmacologic and pharmacologic interventions.   Goal: Pain level will decrease  Description: Pain level will decrease  Outcome: Ongoing  Goal: Control of acute pain  Description: Control of acute pain  Outcome: Ongoing  Goal: Control of chronic pain  Description: Control of chronic pain  Outcome: Ongoing     Problem: Falls - Risk of:  Goal: Will remain free from falls  Description: Will remain free from falls  Outcome: Ongoing  Goal: Absence of physical injury  Description: Absence of physical injury  Outcome: Ongoing

## 2022-03-07 NOTE — PROGRESS NOTES
Family and patient are non compliant with NPO status except chips and sips, patient and wife are getting patient full pitchers of ice at a time instead of using sparingly, also patient has been unhooking his on NG tube for periods of time so probably does not have accurate output educated patient on the NPO status and the need to keep hooked up to suction, they verbalize understanding but continue to do their own things.

## 2022-03-07 NOTE — PROGRESS NOTES
Sent a secured text message via voalte to hospitalist, Dr. Byrd Blind reporting elevated WBC and asked if to continue completed Zosyn. Sent message back not to restart Zosyn.

## 2022-03-07 NOTE — PROGRESS NOTES
Daily Progress Note    Date:3/7/2022  Patient: Renella Shone  : 1950  Nando Miranda MD    Admit Date: 2022  7:24 PM   LOS: 7 days       Subjective: Patient reported to have had some small bowel movements and loose stool. He continues to drink water that is not yet recommended. NG tube hooked to low intermittent wall suctioning with intermittent ambulation while clamped. Still has mild to moderate abdominal distention but no vomiting or abdominal pain reported. No fever chills or rigors reported. Summary: 79-year-old male with metastatic renal cell carcinoma followed at [facility], presented with severe epigastric and right upper quadrant pain progressively worsening over the past few days with radiation to the back, with lipase over 3000 and CT abdomen findings consistent with acute pancreatitis, also noted to have cholelithiasis with pericholecystic inflammation present concerning for cholecystitis. Findings concerning for gallstone pancreatitis. Patient maintained on IV fluids with pain control. Lipase within normal limits. General surgery consulted to evaluate for potential cholecystectomy. Plan for cholecystectomy on outpatient basis.       Review of Systems    Comprehensive ROS completed and is negative except as otherwise noted        Objective:      Vital signs in last 24 hours:  Patient Vitals for the past 24 hrs:   BP Temp Temp src Pulse Resp SpO2   22 0803 133/64 97.5 °F (36.4 °C) Temporal 109 20 93 %   22 0153 (!) 152/72 98.6 °F (37 °C) Temporal 112 18 91 %   22 1947 (!) 153/79 98.7 °F (37.1 °C) Temporal 118 16 90 %   22 1708 (!) 153/74 98.4 °F (36.9 °C) Temporal 109 18 93 %   22 1429 (!) 140/71 97.9 °F (36.6 °C) Temporal 104 20 94 %   22 1105 (!) 149/77 98.6 °F (37 °C) Temporal 111 18 93 %     Physical exam    General: Resting in no acute distress  Psych: Normal mood and affect  HEENT: NC/AT, no scleral icterus. NG tube in place   Cardiovascular: Regular rhythm, pulses 2+ and equal  Respiratory: Clear to auscultation bilaterally, no wheezes  Abdomen: Nontender, abdomen stable with mild to moderate distention with distant tympanic bowel sounds present.   Neurologic: Nonfocal exam, normal speech  Extremities: No clubbing cyanosis or edema  Musculoskeletal: No deformities appreciated  Skin: Warm and dry, non-jaundiced    Lab Review   Recent Results (from the past 24 hour(s))   CBC with Auto Differential    Collection Time: 03/07/22  4:29 AM   Result Value Ref Range    WBC 14.5 (H) 4.8 - 10.8 K/uL    RBC 3.86 (L) 4.70 - 6.10 M/uL    Hemoglobin 11.8 (L) 14.0 - 18.0 g/dL    Hematocrit 35.6 (L) 42.0 - 52.0 %    MCV 92.2 80.0 - 94.0 fL    MCH 30.6 27.0 - 31.0 pg    MCHC 33.1 33.0 - 37.0 g/dL    RDW 14.6 (H) 11.5 - 14.5 %    Platelets 655 731 - 630 K/uL    MPV 10.1 9.4 - 12.4 fL    Neutrophils % 78.2 (H) 50.0 - 65.0 %    Lymphocytes % 8.2 (L) 20.0 - 40.0 %    Monocytes % 11.3 (H) 0.0 - 10.0 %    Eosinophils % 0.7 0.0 - 5.0 %    Basophils % 0.2 0.0 - 1.0 %    Neutrophils Absolute 11.3 (H) 1.5 - 7.5 K/uL    Immature Granulocytes # 0.2 K/uL    Lymphocytes Absolute 1.2 1.1 - 4.5 K/uL    Monocytes Absolute 1.60 (H) 0.00 - 0.90 K/uL    Eosinophils Absolute 0.10 0.00 - 0.60 K/uL    Basophils Absolute 0.00 0.00 - 0.20 K/uL   Comprehensive Metabolic Panel w/ Reflex to MG    Collection Time: 03/07/22  4:29 AM   Result Value Ref Range    Sodium 140 136 - 145 mmol/L    Potassium reflex Magnesium 3.3 (L) 3.5 - 5.0 mmol/L    Chloride 103 98 - 111 mmol/L    CO2 25 22 - 29 mmol/L    Anion Gap 12 7 - 19 mmol/L    Glucose 152 (H) 74 - 109 mg/dL    BUN 15 8 - 23 mg/dL    CREATININE 0.8 0.5 - 1.2 mg/dL    GFR Non-African American >60 >60    GFR African American >59 >59    Calcium 7.3 (L) 8.8 - 10.2 mg/dL    Total Protein 4.8 (L) 6.6 - 8.7 g/dL    Albumin 2.9 (L) 3.5 - 5.2 g/dL    Total Bilirubin 1.2 0.2 - 1.2 mg/dL    Alkaline Phosphatase 64 40 - 130 U/L    ALT 17 5 - 41 U/L    AST 23 5 - 40 U/L   Lipase    Collection Time: 03/07/22  4:29 AM   Result Value Ref Range    Lipase 26 13 - 60 U/L   Magnesium    Collection Time: 03/07/22  4:29 AM   Result Value Ref Range    Magnesium 2.5 (H) 1.6 - 2.4 mg/dL       I/O last 3 completed shifts: In: 7090 [P.O.:2350; I.V.:3958.7; IV Piggyback:781.3]  Out: 2113 [Urine:1200; Emesis/NG output:5150]  No intake/output data recorded.       Current Facility-Administered Medications:     potassium chloride 10 mEq/100 mL IVPB (Peripheral Line), 10 mEq, IntraVENous, PRN, Claudell Brock, MD, Last Rate: 100 mL/hr at 03/07/22 0908, 10 mEq at 03/07/22 0908    dextrose 5 % and 0.9 % NaCl with KCl 20 mEq infusion, , IntraVENous, Continuous, Claudell Brock, MD, Last Rate: 100 mL/hr at 03/07/22 0529, New Bag at 03/07/22 0529    bisacodyl (DULCOLAX) suppository 10 mg, 10 mg, Rectal, Daily PRN, Claudell Brock, MD    tamsulosin (FLOMAX) capsule 0.4 mg, 0.4 mg, Oral, Daily, Claudell Brock, MD, 0.4 mg at 03/07/22 0914    polyethylene glycol (GLYCOLAX) packet 17 g, 17 g, Oral, Daily, Claudell Brock, MD, 17 g at 03/06/22 0808    docusate sodium (COLACE) capsule 100 mg, 100 mg, Oral, Daily, Claudell Brock, MD, 100 mg at 03/06/22 0808    oxyCODONE-acetaminophen (PERCOCET)  MG per tablet 1 tablet, 1 tablet, Oral, Q4H PRN, Teena Mcadams DO, 1 tablet at 03/07/22 0919    HYDROmorphone HCl PF (DILAUDID) injection 0.5 mg, 0.5 mg, IntraVENous, Q4H PRN, Abby Dietz MD, 0.5 mg at 03/07/22 0130    [Held by provider] famotidine (PEPCID) tablet 20 mg, 20 mg, Oral, Daily, Abby Dietz MD    cetirizine (ZYRTEC) tablet 10 mg, 10 mg, Oral, Daily, Abby Dietz MD, 10 mg at 03/07/22 0914    famotidine (PEPCID) injection 20 mg, 20 mg, IntraVENous, BID, Abby Dietz MD, 20 mg at 03/07/22 0848    sodium chloride flush 0.9 % injection 5-40 mL, 5-40 mL, IntraVENous, 2 times per day, Patience DO Abbe, 10 mL at 03/06/22 2013    sodium chloride flush 0.9 % injection 5-40 mL, 5-40 mL, IntraVENous, PRN, Patience Abbe, DO    enoxaparin (LOVENOX) injection 40 mg, 40 mg, SubCUTAneous, Daily, Patience Abbe, DO, 40 mg at 03/07/22 0848    ondansetron (ZOFRAN-ODT) disintegrating tablet 4 mg, 4 mg, Oral, Q8H PRN **OR** ondansetron (ZOFRAN) injection 4 mg, 4 mg, IntraVENous, Q6H PRN, Patience Abbe, DO, 4 mg at 03/03/22 1152    polyethylene glycol (GLYCOLAX) packet 17 g, 17 g, Oral, Daily PRN, Patience Abbe, DO    acetaminophen (TYLENOL) tablet 650 mg, 650 mg, Oral, Q6H PRN **OR** acetaminophen (TYLENOL) suppository 650 mg, 650 mg, Rectal, Q6H PRN, Patience Abbe, DO        Assessment/plan  Principal Problem:    Pancreatitis, gallstone  Active Problems:    Acute pancreatitis    Cholelithiasis    Dehydration    Abdominal pain  Resolved Problems:    * No resolved hospital problems. *      Acute pancreatitis, suspect gallstone pancreatitis with cholelithiasis  Pericholecystic inflammation noted on imaging  CT abdomen and gallbladder ultrasound reviewed  General surgery consult follow-up and reevaluation appreciated. Plan for possible cholecystectomy when more stable. Continue IV fluid resuscitation, cautiously as patient seems to require oxygen likely due to fluid overload. Will continue with IV fluids for hydration. Pain control   N.p.o. except sips of meds  Triglycerides 49 and lipase 267-->31-->26. Nutrition assessment suggesting TPN as alternative means of nutrition as patient continues to remain n.p.o., to consider short-term TPN 4.25/10 @ 90ml/hr, when able to obtain PICC line. Unless surgical service suggests otherwise. Abdominal distention  KUB showing nonspecific gas pattern with dilated small bowel and air throughout most of the colon. No air in the rectum. CT abdomen pelvis to rule out small bowel or large bowel obstruction was suggestive of ileus versus partial bowel obstruction. .  Plan n.p.o. with IV fluids for hydration. KUB suggestive of nonspecific gas patterns. This was followed up with CT abdomen pelvis that showed probable ileus versus partial large bowel obstruction. NG tube placed to low intermittent wall suctioning while in bed. Encourage ambulation and out of bed to chair. Surgical follow-up and re-evaluation appreciated, will follow further recommendations. Leukocytosis, improved  WBC 14.6-->14.5  No evidence of pancreatic necrosis or other clear infectious source  Patient spiked a fever overnight. Will stop Zosyn. And continue to monitor CBC. DVT prophylaxis with Lovenox    GI prophylaxis, Pepcid    Plan of care to be adjusted depending on response to treatment and further evaluation.     sAhley Call MD 3/7/2022 10:13 AM

## 2022-03-08 LAB
ALBUMIN SERPL-MCNC: 2.8 G/DL (ref 3.5–5.2)
ALP BLD-CCNC: 67 U/L (ref 40–130)
ALT SERPL-CCNC: 19 U/L (ref 5–41)
ANION GAP SERPL CALCULATED.3IONS-SCNC: 14 MMOL/L (ref 7–19)
AST SERPL-CCNC: 25 U/L (ref 5–40)
BASOPHILS ABSOLUTE: 0 K/UL (ref 0–0.2)
BASOPHILS RELATIVE PERCENT: 0.2 % (ref 0–1)
BILIRUB SERPL-MCNC: 0.9 MG/DL (ref 0.2–1.2)
BUN BLDV-MCNC: 13 MG/DL (ref 8–23)
CALCIUM SERPL-MCNC: 7.3 MG/DL (ref 8.8–10.2)
CHLORIDE BLD-SCNC: 103 MMOL/L (ref 98–111)
CO2: 22 MMOL/L (ref 22–29)
CREAT SERPL-MCNC: 0.8 MG/DL (ref 0.5–1.2)
EOSINOPHILS ABSOLUTE: 0.1 K/UL (ref 0–0.6)
EOSINOPHILS RELATIVE PERCENT: 0.4 % (ref 0–5)
GFR AFRICAN AMERICAN: >59
GFR NON-AFRICAN AMERICAN: >60
GLUCOSE BLD-MCNC: 172 MG/DL (ref 74–109)
HCT VFR BLD CALC: 33.7 % (ref 42–52)
HEMOGLOBIN: 11 G/DL (ref 14–18)
IMMATURE GRANULOCYTES #: 0.2 K/UL
LIPASE: 29 U/L (ref 13–60)
LYMPHOCYTES ABSOLUTE: 1.3 K/UL (ref 1.1–4.5)
LYMPHOCYTES RELATIVE PERCENT: 7.7 % (ref 20–40)
MCH RBC QN AUTO: 30.5 PG (ref 27–31)
MCHC RBC AUTO-ENTMCNC: 32.6 G/DL (ref 33–37)
MCV RBC AUTO: 93.4 FL (ref 80–94)
MONOCYTES ABSOLUTE: 1.3 K/UL (ref 0–0.9)
MONOCYTES RELATIVE PERCENT: 8.1 % (ref 0–10)
NEUTROPHILS ABSOLUTE: 13.4 K/UL (ref 1.5–7.5)
NEUTROPHILS RELATIVE PERCENT: 82.2 % (ref 50–65)
PDW BLD-RTO: 14.5 % (ref 11.5–14.5)
PLATELET # BLD: 237 K/UL (ref 130–400)
PMV BLD AUTO: 10.3 FL (ref 9.4–12.4)
POTASSIUM REFLEX MAGNESIUM: 3.6 MMOL/L (ref 3.5–5)
RBC # BLD: 3.61 M/UL (ref 4.7–6.1)
SODIUM BLD-SCNC: 139 MMOL/L (ref 136–145)
TOTAL PROTEIN: 4.7 G/DL (ref 6.6–8.7)
WBC # BLD: 16.2 K/UL (ref 4.8–10.8)

## 2022-03-08 PROCEDURE — 6360000002 HC RX W HCPCS: Performed by: INTERNAL MEDICINE

## 2022-03-08 PROCEDURE — 6370000000 HC RX 637 (ALT 250 FOR IP): Performed by: HOSPITALIST

## 2022-03-08 PROCEDURE — 2500000003 HC RX 250 WO HCPCS: Performed by: STUDENT IN AN ORGANIZED HEALTH CARE EDUCATION/TRAINING PROGRAM

## 2022-03-08 PROCEDURE — 1210000000 HC MED SURG R&B

## 2022-03-08 PROCEDURE — 36415 COLL VENOUS BLD VENIPUNCTURE: CPT

## 2022-03-08 PROCEDURE — 99233 SBSQ HOSP IP/OBS HIGH 50: CPT | Performed by: SURGERY

## 2022-03-08 PROCEDURE — 80053 COMPREHEN METABOLIC PANEL: CPT

## 2022-03-08 PROCEDURE — 85025 COMPLETE CBC W/AUTO DIFF WBC: CPT

## 2022-03-08 PROCEDURE — 6360000002 HC RX W HCPCS: Performed by: STUDENT IN AN ORGANIZED HEALTH CARE EDUCATION/TRAINING PROGRAM

## 2022-03-08 PROCEDURE — 6370000000 HC RX 637 (ALT 250 FOR IP): Performed by: INTERNAL MEDICINE

## 2022-03-08 PROCEDURE — 2580000003 HC RX 258: Performed by: SURGERY

## 2022-03-08 PROCEDURE — 83690 ASSAY OF LIPASE: CPT

## 2022-03-08 RX ORDER — SODIUM CHLORIDE 9 MG/ML
INJECTION, SOLUTION INTRAVENOUS CONTINUOUS
Status: DISCONTINUED | OUTPATIENT
Start: 2022-03-08 | End: 2022-03-11

## 2022-03-08 RX ORDER — SODIUM CHLORIDE 0.9 % (FLUSH) 0.9 %
5-40 SYRINGE (ML) INJECTION PRN
Status: DISCONTINUED | OUTPATIENT
Start: 2022-03-08 | End: 2022-03-08

## 2022-03-08 RX ORDER — SODIUM CHLORIDE 0.9 % (FLUSH) 0.9 %
5-40 SYRINGE (ML) INJECTION EVERY 12 HOURS SCHEDULED
Status: DISCONTINUED | OUTPATIENT
Start: 2022-03-08 | End: 2022-03-08

## 2022-03-08 RX ORDER — CETIRIZINE HYDROCHLORIDE 10 MG/1
10 TABLET ORAL EVERY EVENING
Status: DISCONTINUED | OUTPATIENT
Start: 2022-03-08 | End: 2022-03-12 | Stop reason: HOSPADM

## 2022-03-08 RX ORDER — SODIUM CHLORIDE 9 MG/ML
25 INJECTION, SOLUTION INTRAVENOUS PRN
Status: DISCONTINUED | OUTPATIENT
Start: 2022-03-08 | End: 2022-03-12 | Stop reason: HOSPADM

## 2022-03-08 RX ORDER — LIDOCAINE HYDROCHLORIDE 10 MG/ML
5 INJECTION, SOLUTION EPIDURAL; INFILTRATION; INTRACAUDAL; PERINEURAL ONCE
Status: DISCONTINUED | OUTPATIENT
Start: 2022-03-08 | End: 2022-03-08

## 2022-03-08 RX ADMIN — OXYCODONE AND ACETAMINOPHEN 1 TABLET: 10; 325 TABLET ORAL at 02:24

## 2022-03-08 RX ADMIN — FAMOTIDINE 20 MG: 10 INJECTION, SOLUTION INTRAVENOUS at 20:35

## 2022-03-08 RX ADMIN — HYDROMORPHONE HYDROCHLORIDE 0.5 MG: 1 INJECTION, SOLUTION INTRAMUSCULAR; INTRAVENOUS; SUBCUTANEOUS at 00:01

## 2022-03-08 RX ADMIN — OXYCODONE AND ACETAMINOPHEN 1 TABLET: 10; 325 TABLET ORAL at 18:23

## 2022-03-08 RX ADMIN — TAMSULOSIN HYDROCHLORIDE 0.4 MG: 0.4 CAPSULE ORAL at 07:51

## 2022-03-08 RX ADMIN — ENOXAPARIN SODIUM 40 MG: 100 INJECTION SUBCUTANEOUS at 07:49

## 2022-03-08 RX ADMIN — FAMOTIDINE 20 MG: 10 INJECTION, SOLUTION INTRAVENOUS at 07:51

## 2022-03-08 RX ADMIN — POLYETHYLENE GLYCOL 3350 17 G: 17 POWDER, FOR SOLUTION ORAL at 07:55

## 2022-03-08 RX ADMIN — SODIUM CHLORIDE: 9 INJECTION, SOLUTION INTRAVENOUS at 08:31

## 2022-03-08 RX ADMIN — OXYCODONE AND ACETAMINOPHEN 1 TABLET: 10; 325 TABLET ORAL at 07:50

## 2022-03-08 RX ADMIN — DOCUSATE SODIUM 100 MG: 100 CAPSULE, LIQUID FILLED ORAL at 07:51

## 2022-03-08 RX ADMIN — CETIRIZINE HYDROCHLORIDE 10 MG: 10 TABLET, FILM COATED ORAL at 18:17

## 2022-03-08 RX ADMIN — SODIUM CHLORIDE: 9 INJECTION, SOLUTION INTRAVENOUS at 21:57

## 2022-03-08 RX ADMIN — OXYCODONE AND ACETAMINOPHEN 1 TABLET: 10; 325 TABLET ORAL at 22:19

## 2022-03-08 RX ADMIN — OXYCODONE AND ACETAMINOPHEN 1 TABLET: 10; 325 TABLET ORAL at 14:08

## 2022-03-08 ASSESSMENT — PAIN SCALES - GENERAL
PAINLEVEL_OUTOF10: 2
PAINLEVEL_OUTOF10: 7
PAINLEVEL_OUTOF10: 2
PAINLEVEL_OUTOF10: 5
PAINLEVEL_OUTOF10: 5
PAINLEVEL_OUTOF10: 4
PAINLEVEL_OUTOF10: 2
PAINLEVEL_OUTOF10: 4
PAINLEVEL_OUTOF10: 4

## 2022-03-08 ASSESSMENT — PAIN DESCRIPTION - DESCRIPTORS
DESCRIPTORS: JABBING
DESCRIPTORS: SORE;CONSTANT
DESCRIPTORS: ACHING;DISCOMFORT;DULL
DESCRIPTORS: SORE

## 2022-03-08 ASSESSMENT — PAIN DESCRIPTION - ORIENTATION
ORIENTATION: LOWER
ORIENTATION: UPPER
ORIENTATION: UPPER
ORIENTATION: LOWER

## 2022-03-08 ASSESSMENT — PAIN DESCRIPTION - FREQUENCY
FREQUENCY: CONTINUOUS
FREQUENCY: INTERMITTENT

## 2022-03-08 ASSESSMENT — PAIN DESCRIPTION - ONSET
ONSET: ON-GOING
ONSET: GRADUAL

## 2022-03-08 ASSESSMENT — PAIN DESCRIPTION - PROGRESSION
CLINICAL_PROGRESSION: GRADUALLY WORSENING
CLINICAL_PROGRESSION: GRADUALLY WORSENING
CLINICAL_PROGRESSION: NOT CHANGED
CLINICAL_PROGRESSION: GRADUALLY WORSENING

## 2022-03-08 ASSESSMENT — PAIN - FUNCTIONAL ASSESSMENT
PAIN_FUNCTIONAL_ASSESSMENT: ACTIVITIES ARE NOT PREVENTED

## 2022-03-08 ASSESSMENT — PAIN DESCRIPTION - LOCATION
LOCATION: BACK
LOCATION: ABDOMEN
LOCATION: ABDOMEN
LOCATION: BACK

## 2022-03-08 ASSESSMENT — PAIN DESCRIPTION - PAIN TYPE
TYPE: ACUTE PAIN
TYPE: CHRONIC PAIN
TYPE: CHRONIC PAIN
TYPE: ACUTE PAIN

## 2022-03-08 NOTE — PLAN OF CARE
Nutrition Problem #1: Inadequate oral intake  Intervention: Food and/or Nutrient Delivery: Continue NPO,Start Parenteral Nutrition  Nutritional Goals: New goal: Pt will start and tolerate TPN without s/sx of intolerance.     Problem: Nutrition  Goal: Optimal nutrition therapy  3/8/2022 1048 by Casper Phillips  Outcome: Ongoing  3/7/2022 2346 by Ovi Foley RN  Outcome: Ongoing

## 2022-03-08 NOTE — PROGRESS NOTES
[facility] General Surgery Progress Note    Chief Complaint:   Chief Complaint   Patient presents with    Abdominal Pain     all over but worse in LRQ       SUBJECTIVE:  Mr. Samantha Chaudhry is a 73 y.o. male is seen and examined at bedside. Multiple BMs. Pain improved. OBJECTIVE:  BP (!) 147/68   Pulse 108   Temp 98.8 °F (37.1 °C) (Temporal)   Resp 18   Ht 6' 1\" (1.854 m)   Wt 210 lb 14.4 oz (95.7 kg)   SpO2 95%   BMI 27.82 kg/m²   CONSTITUTIONAL: Alert, appropriate, no acute distress  SKIN: warm, dry with no rashes or lesions  HEENT: NCAT, Non icteric, PERR. Trachea Midline. NG in place  CHEST/LUNGS: CTA bilaterally. Normal respiratory effort. CARDIOVASCULAR: RRR, No edema. ABDOMEN: Distended, TTP much improved  NEUROLOGIC: CN II-XI grossly intact, no motor or sensory deficits   MUSCULOSKELETAL: No clubbing or cyanosis. PSYCHIATRIC:  Normal Mood and Affect. Alert and Oriented. Labs:  CBC:   Recent Labs     03/06/22 0224 03/07/22 0429 03/08/22  0327   WBC 14.6* 14.5* 16.2*   HGB 12.4* 11.8* 11.0*    189 237     BMP:    Recent Labs     03/06/22 0224 03/07/22 0429 03/08/22  0327    140 139   K 3.4* 3.3* 3.6    103 103   CO2 24 25 22   BUN 24* 15 13   CREATININE 0.9 0.8 0.8   GLUCOSE 156* 152* 172*       ASSESSMENT:  Principal Problem:    Pancreatitis, gallstone  Active Problems:    Acute pancreatitis    Cholelithiasis    Dehydration    Abdominal pain  Resolved Problems:    * No resolved hospital problems.  *      PLAN:    Clamp NGT  Start CLD  Consider PICC line and TPN if doesn't tolerate CLD  Cholecystectomy as an OP when stabilized from his Pancreatitis   Pain and nausea control      Bishop Ivory DO   Electronically Signed on 3/8/2022 at 8:23 AM

## 2022-03-08 NOTE — PROGRESS NOTES
Dr. Freddy Torres at the bedside this am. Ordered clear liquid diet and to keep NG tube clamped, unless nausea.

## 2022-03-08 NOTE — PROGRESS NOTES
Patient has ambulated independently. He did sit in bedside chair this afternoon for 2 hours. Tolerated well.

## 2022-03-08 NOTE — PROGRESS NOTES
Comprehensive Nutrition Assessment    Type and Reason for Visit:  Reassess    Nutrition Recommendations/Plan: Recommend TPN initiation: Clinimix 5/15 @ 108 ml/hr with IVFE twice weekly. Nutrition Assessment:  Pt is trialing clear liquid diet. Pt continues to c/o N/V. If clear liquid is not tolerated and PICC is placed, reccomend Clinimix 5/15 @ goal rate of 108 ml/hr. Add 250ml/hr intravenous fat emulsion twice weekly. This rate provides 518.4 kcal from protein and 1321.92 kcal from CHO. Fat emulsion adds an additional 1000 non-protein kcal/week. Start and advance slowly as pt is at risk for refeeding syndrome. Will continue to monitor nutrition and TPN status. Malnutrition Assessment:  Malnutrition Status: At risk for malnutrition (Comment)    Context:  Acute Illness     Findings of the 6 clinical characteristics of malnutrition:  Energy Intake:  7 - 50% or less of estimated energy requirements for 5 or more days  Weight Loss:  No significant weight loss     Body Fat Loss:  No significant body fat loss     Muscle Mass Loss:  No significant muscle mass loss    Fluid Accumulation:  No significant fluid accumulation     Strength:  Not Performed    Estimated Daily Nutrient Needs:  Energy (kcal):  8311-2111 kcal/d (20-25 kcal/kg); Weight Used for Energy Requirements:  Current     Protein (g):  109-168 g/d (1.3-2.0 g/kg IBW); Weight Used for Protein Requirements:  Ideal        Fluid (ml/day):  3267-0546 ml/d; Method Used for Fluid Requirements:  1 ml/kcal      Nutrition Related Findings:  Clear liquid trials, glu 152-172      Current Nutrition Therapies:    ADULT DIET; Clear Liquid;  Low Sodium (2 gm)    Anthropometric Measures:  · Height: 6' 1\" (185.4 cm)  · Current Body Weight: 210 lb (95.3 kg)   · Admission Body Weight: 210 lb (95.3 kg)    · Usual Body Weight: 210 lb 12.2 oz (95.6 kg)     · Ideal Body Weight: 184 lbs; % Ideal Body Weight 114.6 %   · BMI: 27.7    · BMI Categories: Overweight (BMI 25.0-29. 9)       Nutrition Diagnosis:   · Inadequate oral intake related to acute injury/trauma as evidenced by NPO or clear liquid status due to medical condition,poor intake prior to admission    Nutrition Interventions:   Food and/or Nutrient Delivery:  Continue NPO,Start Parenteral Nutrition  Nutrition Education/Counseling:  No recommendation at this time   Coordination of Nutrition Care:  Continue to monitor while inpatient    Goals:  New goal: Pt will start and tolerate TPN without s/sx of intolerance.        Nutrition Monitoring and Evaluation:   Food/Nutrient Intake Outcomes:  Diet Advancement/Tolerance,Parenteral Nutrition Intake/Tolerance,Food and Nutrient Intake  Physical Signs/Symptoms Outcomes:  Biochemical Data,GI Status,Nausea or Vomiting,Fluid Status or Edema,Nutrition Focused Physical Findings,Skin,Weight     Electronically signed by Ed Flowers on 3/8/22 at 10:45 AM CST    Contact: [phone]

## 2022-03-08 NOTE — PROGRESS NOTES
Patient continues to deny any n/v. NG tube remains clamped. He stated he does not have any severe abdominal pain. Minimal abdominal discomfort. Patient reports that he has chronic back pain. Has tolerated clear liquid diet for breakfast and lunch with no abdominal cramping or n/v. Had watery BM with some formed stools this afternoon.

## 2022-03-08 NOTE — PROGRESS NOTES
bisacodyl (DULCOLAX) suppository 10 mg  10 mg Rectal Daily PRN Luanne Laird MD        tamsulosin (FLOMAX) capsule 0.4 mg  0.4 mg Oral Daily Luanne Laird MD   0.4 mg at 03/07/22 0914    polyethylene glycol (GLYCOLAX) packet 17 g  17 g Oral Daily Luanne Laird MD   17 g at 03/06/22 1683    docusate sodium (COLACE) capsule 100 mg  100 mg Oral Daily Luanne Laird MD   100 mg at 03/06/22 0808    oxyCODONE-acetaminophen (PERCOCET)  MG per tablet 1 tablet  1 tablet Oral Q4H PRN Patience Abbe, DO   1 tablet at 03/08/22 0224    HYDROmorphone HCl PF (DILAUDID) injection 0.5 mg  0.5 mg IntraVENous Q4H PRN Ricky Newton MD   0.5 mg at 03/08/22 0001    famotidine (PEPCID) injection 20 mg  20 mg IntraVENous BID Ricky Newton MD   20 mg at 03/07/22 2027    sodium chloride flush 0.9 % injection 5-40 mL  5-40 mL IntraVENous 2 times per day Patience Abbe, DO   10 mL at 03/07/22 2028    sodium chloride flush 0.9 % injection 5-40 mL  5-40 mL IntraVENous PRN Patience Abbe, DO        enoxaparin (LOVENOX) injection 40 mg  40 mg SubCUTAneous Daily Patience Abbe, DO   40 mg at 03/07/22 0848    ondansetron (ZOFRAN-ODT) disintegrating tablet 4 mg  4 mg Oral Q8H PRN Patience Abbe, DO        Or    ondansetron (ZOFRAN) injection 4 mg  4 mg IntraVENous Q6H PRN Patience Abbe, DO   4 mg at 03/03/22 1152    polyethylene glycol (GLYCOLAX) packet 17 g  17 g Oral Daily PRN Patience Abbe, DO        acetaminophen (TYLENOL) tablet 650 mg  650 mg Oral Q6H PRN Patience Abbe, DO        Or    acetaminophen (TYLENOL) suppository 650 mg  650 mg Rectal Q6H PRN Patience Abbe, DO         DVT Prophylaxis: Lovenox 40 mg sq daily    Continuous Infusions:   sodium chloride      sodium chloride         Intake/Output Summary (Last 24 hours) at 3/8/2022 0742  Last data filed at 3/8/2022 0610  Gross per 24 hour   Intake 2802.6 ml   Output 2900 ml   Net -97.4 ml     CBC:   Recent Labs     03/06/22  0224 03/07/22  0429 03/08/22  0327   WBC 14.6* 14.5* 16.2*   HGB 12.4* 11.8* 11.0*    189 237     BMP:  Recent Labs     03/06/22  0224 03/07/22  0429 03/08/22  0327    140 139   K 3.4* 3.3* 3.6    103 103   CO2 24 25 22   BUN 24* 15 13   CREATININE 0.9 0.8 0.8   GLUCOSE 156* 152* 172*     ABGs: No results found for: PHART, PO2ART, VMC7OOV  INR: No results for input(s): INR in the last 72 hours. Objective:   Vitals: BP (!) 149/71   Pulse 112   Temp 99 °F (37.2 °C) (Temporal)   Resp 16   Ht 6' 1\" (1.854 m)   Wt 210 lb 14.4 oz (95.7 kg)   SpO2 90%   BMI 27.82 kg/m²   General appearance: alert, appears stated age and cooperative  Skin: Skin color, texture, turgor normal.   HEENT: Head: Normocephalic, no lesions, without obvious abnormality, NGT in place.   Neck: no adenopathy, no carotid bruit, no JVD and supple, symmetrical, trachea midline  Lungs: clear to auscultation bilaterally  Heart: regular rate and rhythm, S1, S2 normal, no murmur, click, rub or gallop  Abdomen: soft, non-tender; bowel sounds normal; no masses,  no organomegaly  Extremities: extremities normal, atraumatic, no cyanosis or edema  Lymphatic: No significant lymph node enlargement papable  Neurologic: Mental status: Alert, oriented, thought content appropriate        Assessment & Plan:    · Pancreatitis, gallstone  · Cholelithiasis  · Leukocytosis - 2/2 pancreatitis  · Anemia  · Hyperkalemia - resolved  · Partial SBO- treated with NGT  ·           PLAN:     Clamp NGT  Start CLD  Consider PICC line and TPN if doesn't tolerate CLD  Cholecystectomy as an OP when stabilized from his Pancreatitis   Pain and nausea control      See orders   Disposition: home when cleared for dc by Jessica Grady MD

## 2022-03-09 ENCOUNTER — APPOINTMENT (OUTPATIENT)
Dept: CT IMAGING | Age: 72
DRG: 439 | End: 2022-03-09
Payer: MEDICARE

## 2022-03-09 LAB
ALBUMIN SERPL-MCNC: 2.9 G/DL (ref 3.5–5.2)
ALP BLD-CCNC: 81 U/L (ref 40–130)
ALT SERPL-CCNC: 24 U/L (ref 5–41)
ANION GAP SERPL CALCULATED.3IONS-SCNC: 14 MMOL/L (ref 7–19)
AST SERPL-CCNC: 30 U/L (ref 5–40)
BASOPHILS ABSOLUTE: 0.1 K/UL (ref 0–0.2)
BASOPHILS RELATIVE PERCENT: 0.3 % (ref 0–1)
BILIRUB SERPL-MCNC: 1 MG/DL (ref 0.2–1.2)
BUN BLDV-MCNC: 11 MG/DL (ref 8–23)
CALCIUM SERPL-MCNC: 7.4 MG/DL (ref 8.8–10.2)
CHLORIDE BLD-SCNC: 100 MMOL/L (ref 98–111)
CO2: 23 MMOL/L (ref 22–29)
CREAT SERPL-MCNC: 0.9 MG/DL (ref 0.5–1.2)
EOSINOPHILS ABSOLUTE: 0.1 K/UL (ref 0–0.6)
EOSINOPHILS RELATIVE PERCENT: 0.5 % (ref 0–5)
GFR AFRICAN AMERICAN: >59
GFR NON-AFRICAN AMERICAN: >60
GLUCOSE BLD-MCNC: 151 MG/DL (ref 74–109)
HCT VFR BLD CALC: 34.6 % (ref 42–52)
HEMOGLOBIN: 11.3 G/DL (ref 14–18)
IMMATURE GRANULOCYTES #: 0.3 K/UL
LACTIC ACID: 1.2 MMOL/L (ref 0.5–1.9)
LIPASE: 30 U/L (ref 13–60)
LYMPHOCYTES ABSOLUTE: 1.5 K/UL (ref 1.1–4.5)
LYMPHOCYTES RELATIVE PERCENT: 7.8 % (ref 20–40)
MAGNESIUM: 2.3 MG/DL (ref 1.6–2.4)
MCH RBC QN AUTO: 30.7 PG (ref 27–31)
MCHC RBC AUTO-ENTMCNC: 32.7 G/DL (ref 33–37)
MCV RBC AUTO: 94 FL (ref 80–94)
MONOCYTES ABSOLUTE: 1.3 K/UL (ref 0–0.9)
MONOCYTES RELATIVE PERCENT: 6.8 % (ref 0–10)
NEUTROPHILS ABSOLUTE: 16.2 K/UL (ref 1.5–7.5)
NEUTROPHILS RELATIVE PERCENT: 83.2 % (ref 50–65)
PDW BLD-RTO: 14.5 % (ref 11.5–14.5)
PLATELET # BLD: 293 K/UL (ref 130–400)
PMV BLD AUTO: 10.6 FL (ref 9.4–12.4)
POTASSIUM REFLEX MAGNESIUM: 3.3 MMOL/L (ref 3.5–5)
RBC # BLD: 3.68 M/UL (ref 4.7–6.1)
SODIUM BLD-SCNC: 137 MMOL/L (ref 136–145)
TOTAL PROTEIN: 4.9 G/DL (ref 6.6–8.7)
WBC # BLD: 19.4 K/UL (ref 4.8–10.8)

## 2022-03-09 PROCEDURE — 83735 ASSAY OF MAGNESIUM: CPT

## 2022-03-09 PROCEDURE — 6370000000 HC RX 637 (ALT 250 FOR IP): Performed by: INTERNAL MEDICINE

## 2022-03-09 PROCEDURE — 36415 COLL VENOUS BLD VENIPUNCTURE: CPT

## 2022-03-09 PROCEDURE — 6360000002 HC RX W HCPCS: Performed by: SURGERY

## 2022-03-09 PROCEDURE — 6360000004 HC RX CONTRAST MEDICATION: Performed by: SURGERY

## 2022-03-09 PROCEDURE — 1210000000 HC MED SURG R&B

## 2022-03-09 PROCEDURE — 83690 ASSAY OF LIPASE: CPT

## 2022-03-09 PROCEDURE — 6370000000 HC RX 637 (ALT 250 FOR IP): Performed by: HOSPITALIST

## 2022-03-09 PROCEDURE — 83605 ASSAY OF LACTIC ACID: CPT

## 2022-03-09 PROCEDURE — 2500000003 HC RX 250 WO HCPCS: Performed by: STUDENT IN AN ORGANIZED HEALTH CARE EDUCATION/TRAINING PROGRAM

## 2022-03-09 PROCEDURE — 85025 COMPLETE CBC W/AUTO DIFF WBC: CPT

## 2022-03-09 PROCEDURE — 80053 COMPREHEN METABOLIC PANEL: CPT

## 2022-03-09 PROCEDURE — 74177 CT ABD & PELVIS W/CONTRAST: CPT

## 2022-03-09 PROCEDURE — 6360000002 HC RX W HCPCS: Performed by: INTERNAL MEDICINE

## 2022-03-09 PROCEDURE — 99233 SBSQ HOSP IP/OBS HIGH 50: CPT | Performed by: SURGERY

## 2022-03-09 PROCEDURE — 2580000003 HC RX 258: Performed by: INTERNAL MEDICINE

## 2022-03-09 RX ORDER — POTASSIUM CHLORIDE 20 MEQ/1
20 TABLET, EXTENDED RELEASE ORAL DAILY
Status: DISCONTINUED | OUTPATIENT
Start: 2022-03-09 | End: 2022-03-10

## 2022-03-09 RX ORDER — CLONIDINE HYDROCHLORIDE 0.1 MG/1
0.1 TABLET ORAL 3 TIMES DAILY
Status: DISCONTINUED | OUTPATIENT
Start: 2022-03-09 | End: 2022-03-12 | Stop reason: HOSPADM

## 2022-03-09 RX ORDER — CLONIDINE HYDROCHLORIDE 0.1 MG/1
0.1 TABLET ORAL 2 TIMES DAILY
Status: DISCONTINUED | OUTPATIENT
Start: 2022-03-09 | End: 2022-03-09

## 2022-03-09 RX ORDER — MEROPENEM AND SODIUM CHLORIDE 1 G/50ML
1000 INJECTION, SOLUTION INTRAVENOUS EVERY 8 HOURS
Status: DISCONTINUED | OUTPATIENT
Start: 2022-03-09 | End: 2022-03-12 | Stop reason: HOSPADM

## 2022-03-09 RX ADMIN — IOPAMIDOL 90 ML: 755 INJECTION, SOLUTION INTRAVENOUS at 09:37

## 2022-03-09 RX ADMIN — MEROPENEM AND SODIUM CHLORIDE 1000 MG: 1 INJECTION, SOLUTION INTRAVENOUS at 15:39

## 2022-03-09 RX ADMIN — CLONIDINE HYDROCHLORIDE 0.1 MG: 0.1 TABLET ORAL at 21:29

## 2022-03-09 RX ADMIN — OXYCODONE AND ACETAMINOPHEN 1 TABLET: 10; 325 TABLET ORAL at 02:21

## 2022-03-09 RX ADMIN — OXYCODONE AND ACETAMINOPHEN 1 TABLET: 10; 325 TABLET ORAL at 06:58

## 2022-03-09 RX ADMIN — DOCUSATE SODIUM 100 MG: 100 CAPSULE, LIQUID FILLED ORAL at 10:31

## 2022-03-09 RX ADMIN — POTASSIUM CHLORIDE 20 MEQ: 1500 TABLET, EXTENDED RELEASE ORAL at 10:31

## 2022-03-09 RX ADMIN — FAMOTIDINE 20 MG: 10 INJECTION, SOLUTION INTRAVENOUS at 09:03

## 2022-03-09 RX ADMIN — CLONIDINE HYDROCHLORIDE 0.1 MG: 0.1 TABLET ORAL at 10:30

## 2022-03-09 RX ADMIN — FAMOTIDINE 20 MG: 10 INJECTION, SOLUTION INTRAVENOUS at 21:28

## 2022-03-09 RX ADMIN — OXYCODONE AND ACETAMINOPHEN 1 TABLET: 10; 325 TABLET ORAL at 19:14

## 2022-03-09 RX ADMIN — CETIRIZINE HYDROCHLORIDE 10 MG: 10 TABLET, FILM COATED ORAL at 17:53

## 2022-03-09 RX ADMIN — CLONIDINE HYDROCHLORIDE 0.1 MG: 0.1 TABLET ORAL at 15:03

## 2022-03-09 RX ADMIN — SODIUM CHLORIDE, PRESERVATIVE FREE 10 ML: 5 INJECTION INTRAVENOUS at 09:04

## 2022-03-09 RX ADMIN — ENOXAPARIN SODIUM 40 MG: 100 INJECTION SUBCUTANEOUS at 09:03

## 2022-03-09 RX ADMIN — TAMSULOSIN HYDROCHLORIDE 0.4 MG: 0.4 CAPSULE ORAL at 10:31

## 2022-03-09 RX ADMIN — MEROPENEM AND SODIUM CHLORIDE 1000 MG: 1 INJECTION, SOLUTION INTRAVENOUS at 23:47

## 2022-03-09 ASSESSMENT — PAIN DESCRIPTION - ORIENTATION: ORIENTATION: LOWER

## 2022-03-09 ASSESSMENT — PAIN SCALES - GENERAL
PAINLEVEL_OUTOF10: 2
PAINLEVEL_OUTOF10: 5
PAINLEVEL_OUTOF10: 5
PAINLEVEL_OUTOF10: 7

## 2022-03-09 ASSESSMENT — PAIN DESCRIPTION - ONSET: ONSET: ON-GOING

## 2022-03-09 ASSESSMENT — PAIN DESCRIPTION - DESCRIPTORS: DESCRIPTORS: CONSTANT;SORE

## 2022-03-09 ASSESSMENT — PAIN DESCRIPTION - PAIN TYPE: TYPE: CHRONIC PAIN

## 2022-03-09 ASSESSMENT — PAIN - FUNCTIONAL ASSESSMENT: PAIN_FUNCTIONAL_ASSESSMENT: PREVENTS OR INTERFERES SOME ACTIVE ACTIVITIES AND ADLS

## 2022-03-09 ASSESSMENT — PAIN DESCRIPTION - FREQUENCY: FREQUENCY: CONTINUOUS

## 2022-03-09 ASSESSMENT — PAIN DESCRIPTION - PROGRESSION
CLINICAL_PROGRESSION: NOT CHANGED
CLINICAL_PROGRESSION: GRADUALLY WORSENING

## 2022-03-09 ASSESSMENT — PAIN DESCRIPTION - LOCATION: LOCATION: BACK

## 2022-03-09 NOTE — PROGRESS NOTES
Comprehensive Nutrition Assessment    Type and Reason for Visit:  Reassess    Nutrition Recommendations/Plan: Recommend EN/PN if po intake remains <25%. Nutrition Assessment:  Pt tolerated clear liquid diet. Pt is at risk for nutrition compromise d/t po intake <25%. K+ 3.3, glucose 151-172. Pt noted to have necrotic pancreas. If decreased po intake continues, recommend EN/PN. Will continue to monitor nutrition status. Malnutrition Assessment:  Malnutrition Status: At risk for malnutrition (Comment)    Context:  Acute Illness     Findings of the 6 clinical characteristics of malnutrition:  Energy Intake:  7 - 50% or less of estimated energy requirements for 5 or more days  Weight Loss:  No significant weight loss     Body Fat Loss:  No significant body fat loss     Muscle Mass Loss:  No significant muscle mass loss    Fluid Accumulation:  No significant fluid accumulation     Strength:  Not Performed    Estimated Daily Nutrient Needs:  Energy (kcal):  6840-8879 kcal/d (20-25 kcal/kg); Weight Used for Energy Requirements:  Current     Protein (g):  109-168 g/d (1.3-2.0 g/kg IBW); Weight Used for Protein Requirements:  Ideal        Fluid (ml/day):  9336-8670 ml/d; Method Used for Fluid Requirements:  1 ml/kcal      Nutrition Related Findings:  K+ 3.3, glu 151-172      Current Nutrition Therapies:    ADULT DIET; Clear Liquid; Low Sodium (2 gm)    Anthropometric Measures:  · Height: 6' 1\" (185.4 cm)  · Current Body Weight: 210 lb (95.3 kg)   · Admission Body Weight: 210 lb (95.3 kg)    · Usual Body Weight: 210 lb 12.2 oz (95.6 kg)     · Ideal Body Weight: 184 lbs; % Ideal Body Weight 114.1 %   · BMI: 27.7  · BMI Categories: Overweight (BMI 25.0-29. 9)       Nutrition Diagnosis:   · Inadequate oral intake related to acute injury/trauma as evidenced by NPO or clear liquid status due to medical condition,intake 0-25%    Nutrition Interventions:   Food and/or Nutrient Delivery:  Continue Current Diet  Nutrition Education/Counseling:  No recommendation at this time   Coordination of Nutrition Care:  Continue to monitor while inpatient    Goals:  New goal: Pt will advance to an oral diet or meet nutritional needs without s/sx of intolerance       Nutrition Monitoring and Evaluation:   Food/Nutrient Intake Outcomes:  Diet Advancement/Tolerance,Food and Nutrient Intake  Physical Signs/Symptoms Outcomes:  GI Status,Nausea or Vomiting,Fluid Status or Edema,Nutrition Focused Physical Findings,Biochemical Data,Weight,Skin     Electronically signed by Satish Stevenson on 3/9/22 at 2:51 PM CST    Contact: [phone]

## 2022-03-09 NOTE — PROGRESS NOTES
HOSPITAL MEDICINE  - PROGRESS NOTE    Admit Date: 2/28/2022         CC: abd pain     Subjective: pain controlled, no N/V, had flatus, no chest pain, no dyspnea    Objective: no distress, NGT in place    81-year-old male with metastatic renal cell carcinoma followed at [facility], presented with severe epigastric and right upper quadrant pain progressively worsening over the past few days with radiation to the back, with lipase over 3000 and CT abdomen findings consistent with acute pancreatitis, also noted to have cholelithiasis with pericholecystic inflammation present concerning for cholecystitis. Findings concerning for gallstone pancreatitis. Patient maintained on IV fluids with pain control. Lipase within normal limits. General surgery consulted to evaluate for potential cholecystectomy. Plan for cholecystectomy on outpatient basis    todays CT abdomen shows persistent enlargement/edema of the head of the pancreas. Absent enhancement of the body of the pancreas suggesting necrosis. General surgery recommended transfer for higher level care at 12.06 pm. The patient refused to be transferred until we obtain his old ct which he believes are showing similar findings. RN Irene Peraza notified to obtain the records stat at 12.16 pm      Diet: ADULT DIET; Clear Liquid;  Low Sodium (2 gm)  Pain is: none   Nausea:None  Bowel Movement/Flatus no    Data:   Scheduled Meds: Reviewed  Current Facility-Administered Medications   Medication Dose Route Frequency Provider Last Rate Last Admin    cloNIDine (CATAPRES) tablet 0.1 mg  0.1 mg Oral BID Claudia Walton MD        potassium chloride (KLOR-CON M) extended release tablet 20 mEq  20 mEq Oral Daily Claudia Walton MD        cetirizine (ZYRTEC) tablet 10 mg  10 mg Oral QPM Claudia Walton MD   10 mg at 03/08/22 1817    0.9 % sodium chloride infusion   IntraVENous Continuous Gianfranco J Glenn Kseniae, DO 75 mL/hr at 03/08/22 2157 New Bag at 03/08/22 2157    0.9 % sodium chloride infusion  25 mL IntraVENous PRN Lalito Melgar MD        potassium chloride 10 mEq/100 mL IVPB (Peripheral Line)  10 mEq IntraVENous PRN Tawana Cotton  mL/hr at 03/07/22 1344 10 mEq at 03/07/22 1344    bisacodyl (DULCOLAX) suppository 10 mg  10 mg Rectal Daily PRN Tawana Cotton MD        tamsulosin (FLOMAX) capsule 0.4 mg  0.4 mg Oral Daily Tawana Cotton MD   0.4 mg at 03/08/22 0751    polyethylene glycol (GLYCOLAX) packet 17 g  17 g Oral Daily Tawana Cotton MD   17 g at 03/08/22 0755    docusate sodium (COLACE) capsule 100 mg  100 mg Oral Daily Tawana Cotton MD   100 mg at 03/08/22 0751    oxyCODONE-acetaminophen (PERCOCET)  MG per tablet 1 tablet  1 tablet Oral Q4H PRN Patience Abbe, DO   1 tablet at 03/09/22 0658    HYDROmorphone HCl PF (DILAUDID) injection 0.5 mg  0.5 mg IntraVENous Q4H PRN Bert Washburn MD   0.5 mg at 03/08/22 0001    famotidine (PEPCID) injection 20 mg  20 mg IntraVENous BID Bert Washburn MD   20 mg at 03/08/22 2035    sodium chloride flush 0.9 % injection 5-40 mL  5-40 mL IntraVENous 2 times per day Patience Abbe, DO   10 mL at 03/07/22 2028    sodium chloride flush 0.9 % injection 5-40 mL  5-40 mL IntraVENous PRN Patience Abbe, DO        enoxaparin (LOVENOX) injection 40 mg  40 mg SubCUTAneous Daily Patience Abbe, DO   40 mg at 03/08/22 0749    ondansetron (ZOFRAN-ODT) disintegrating tablet 4 mg  4 mg Oral Q8H PRN Patience Abbe, DO        Or    ondansetron (ZOFRAN) injection 4 mg  4 mg IntraVENous Q6H PRN Patience Abbe, DO   4 mg at 03/03/22 1152    polyethylene glycol (GLYCOLAX) packet 17 g  17 g Oral Daily PRN Patience Abbe, DO        acetaminophen (TYLENOL) tablet 650 mg  650 mg Oral Q6H PRN Patience Abbe, DO        Or    acetaminophen (TYLENOL) suppository 650 mg  650 mg Rectal Q6H PRN Patience Abbe, DO         DVT Prophylaxis: Lovenox 40 mg sq daily    Continuous Infusions:   sodium chloride 75 mL/hr at 03/08/22 2157    sodium chloride         Intake/Output Summary (Last 24 hours) at 3/9/2022 0851  Last data filed at 3/9/2022 0645  Gross per 24 hour   Intake 3444.5 ml   Output 600 ml   Net 2844.5 ml     CBC:   Recent Labs     03/07/22  0429 03/08/22  0327 03/09/22  0313   WBC 14.5* 16.2* 19.4*   HGB 11.8* 11.0* 11.3*    237 293     BMP:  Recent Labs     03/07/22  0429 03/08/22  0327 03/09/22  0313    139 137   K 3.3* 3.6 3.3*    103 100   CO2 25 22 23   BUN 15 13 11   CREATININE 0.8 0.8 0.9   GLUCOSE 152* 172* 151*     ABGs: No results found for: PHART, PO2ART, VOD6HLA  INR: No results for input(s): INR in the last 72 hours. Objective:   Vitals: BP (!) 151/67   Pulse 112   Temp 98.2 °F (36.8 °C) (Temporal)   Resp 20   Ht 6' 1\" (1.854 m)   Wt 210 lb 14.4 oz (95.7 kg)   SpO2 94%   BMI 27.82 kg/m²   General appearance: alert, appears stated age and cooperative  Skin: Skin color, texture, turgor normal.   HEENT: Head: Normocephalic, no lesions, without obvious abnormality, NGT in place.   Neck: no adenopathy, no carotid bruit, no JVD and supple, symmetrical, trachea midline  Lungs: clear to auscultation bilaterally  Heart: regular rate and rhythm, S1, S2 normal, no murmur, click, rub or gallop  Abdomen: soft, non-tender; bowel sounds normal; no masses,  no organomegaly  Extremities: extremities normal, atraumatic, no cyanosis or edema  Lymphatic: No significant lymph node enlargement papable  Neurologic: Mental status: Alert, oriented, thought content appropriate        Assessment & Plan:    · Pancreatitis, gallstone- consult GI   · Cholelithiasis  · Leukocytosis - 2/2 pancreatitis  · Anemia  · Hyperkalemia - resolved  · Partial SBO- treated with NGT  · Necrosis of the pancreas- general surgery recommended transfer which will be initiated   · Leukocytosis - lactic acid wnl- blood cultures neg- on Merrem per general surgery   · Hypokalemia - replace          transfer for higher level care ASAP if beds available - will try to obtain old ct today if possible to compare the CT scans- if patient does not agree with transfer he can leave [facility]. [facility] [address] on call called me back at 3.33 pm stating he will NOT accept the patient since he is not established patient at [place]  The conversation was recorded. [facility] GI specialist on call stated at this point the necrosis is not infected and does not need drainage therefore he refused to accept the patient under his care stating we can provide here supportive care in the form of IVF and pain control. He was against using IV ab at this point.     [facility] Steele did not accept the transfer due to capacity issues

## 2022-03-09 NOTE — PLAN OF CARE
Nutrition Problem #1: Inadequate oral intake  Intervention: Food and/or Nutrient Delivery: Continue Current Diet  Nutritional Goals: New goal: Pt will advance to an oral diet or meet nutritional needs without s/sx of intolerance    Problem: Nutrition  Goal: Optimal nutrition therapy  Outcome: Ongoing

## 2022-03-09 NOTE — PROGRESS NOTES
[facility] General Surgery Progress Note    Chief Complaint:   Chief Complaint   Patient presents with    Abdominal Pain     all over but worse in LRQ       SUBJECTIVE:  Mr. COL[facility] is a 73 y.o. male is seen and examined at bedside. Repeat CT ordered this a.m. as it was noted that his white blood cell count continues to increase. Patient overall feeling better clinically. Tolerating clear liquid diet. Having multiple bowel movements. OBJECTIVE:  BP (!) 151/67   Pulse 112   Temp 98.2 °F (36.8 °C) (Temporal)   Resp 20   Ht 6' 1\" (1.854 m)   Wt 210 lb 14.4 oz (95.7 kg)   SpO2 94%   BMI 27.82 kg/m²   CONSTITUTIONAL: Alert, appropriate, no acute distress  SKIN: warm, dry with no rashes or lesions  HEENT: NCAT, Non icteric, PERR. Trachea Midline. NG in place  CHEST/LUNGS: CTA bilaterally. Normal respiratory effort. CARDIOVASCULAR: RRR, No edema. ABDOMEN: Distended, TTP much improved  NEUROLOGIC: CN II-XI grossly intact, no motor or sensory deficits   MUSCULOSKELETAL: No clubbing or cyanosis. PSYCHIATRIC:  Normal Mood and Affect. Alert and Oriented. Labs:  CBC:   Recent Labs     03/07/22  0429 03/08/22  0327 03/09/22  0313   WBC 14.5* 16.2* 19.4*   HGB 11.8* 11.0* 11.3*    237 293     BMP:    Recent Labs     03/07/22  0429 03/08/22  0327 03/09/22  0313    139 137   K 3.3* 3.6 3.3*    103 100   CO2 25 22 23   BUN 15 13 11   CREATININE 0.8 0.8 0.9   GLUCOSE 152* 172* 151*       ASSESSMENT:  Principal Problem:    Pancreatitis, gallstone  Active Problems:    Acute pancreatitis    Cholelithiasis    Dehydration    Abdominal pain  Resolved Problems:    * No resolved hospital problems. *      PLAN:    CT scan reviewed: Noted area of the pancreas with no contrast uptake consistent with pancreatic necrosis.   Also worsening peripancreatic/retroperitoneal inflammation  In light of the pancreatic necrosis, would recommend transfer to tertiary care center where patient would readily have necrosectomy available should his necrosis worsen  Start meropenem  I did discuss the imaging with the patient and his wife and they plan to think about the transfer to [facility] and let us know      Gerda Regalado DO   Electronically Signed on 3/9/2022 at 2:15 PM

## 2022-03-10 LAB
ALBUMIN SERPL-MCNC: 2.5 G/DL (ref 3.5–5.2)
ALP BLD-CCNC: 72 U/L (ref 40–130)
ALT SERPL-CCNC: 25 U/L (ref 5–41)
ANION GAP SERPL CALCULATED.3IONS-SCNC: 11 MMOL/L (ref 7–19)
AST SERPL-CCNC: 30 U/L (ref 5–40)
BASOPHILS ABSOLUTE: 0 K/UL (ref 0–0.2)
BASOPHILS RELATIVE PERCENT: 0.2 % (ref 0–1)
BILIRUB SERPL-MCNC: 0.7 MG/DL (ref 0.2–1.2)
BUN BLDV-MCNC: 10 MG/DL (ref 8–23)
CALCIUM SERPL-MCNC: 7.6 MG/DL (ref 8.8–10.2)
CHLORIDE BLD-SCNC: 101 MMOL/L (ref 98–111)
CO2: 22 MMOL/L (ref 22–29)
CREAT SERPL-MCNC: 0.7 MG/DL (ref 0.5–1.2)
EOSINOPHILS ABSOLUTE: 0.1 K/UL (ref 0–0.6)
EOSINOPHILS RELATIVE PERCENT: 0.7 % (ref 0–5)
GFR AFRICAN AMERICAN: >59
GFR NON-AFRICAN AMERICAN: >60
GLUCOSE BLD-MCNC: 138 MG/DL (ref 74–109)
HCT VFR BLD CALC: 34 % (ref 42–52)
HEMOGLOBIN: 11.1 G/DL (ref 14–18)
IMMATURE GRANULOCYTES #: 0.2 K/UL
LIPASE: 27 U/L (ref 13–60)
LYMPHOCYTES ABSOLUTE: 1.3 K/UL (ref 1.1–4.5)
LYMPHOCYTES RELATIVE PERCENT: 7.6 % (ref 20–40)
MAGNESIUM: 2.3 MG/DL (ref 1.6–2.4)
MCH RBC QN AUTO: 30.6 PG (ref 27–31)
MCHC RBC AUTO-ENTMCNC: 32.6 G/DL (ref 33–37)
MCV RBC AUTO: 93.7 FL (ref 80–94)
MONOCYTES ABSOLUTE: 1.2 K/UL (ref 0–0.9)
MONOCYTES RELATIVE PERCENT: 7.1 % (ref 0–10)
NEUTROPHILS ABSOLUTE: 14.2 K/UL (ref 1.5–7.5)
NEUTROPHILS RELATIVE PERCENT: 83.3 % (ref 50–65)
PDW BLD-RTO: 14.5 % (ref 11.5–14.5)
PLATELET # BLD: 301 K/UL (ref 130–400)
PMV BLD AUTO: 10.7 FL (ref 9.4–12.4)
POTASSIUM REFLEX MAGNESIUM: 3.1 MMOL/L (ref 3.5–5)
RBC # BLD: 3.63 M/UL (ref 4.7–6.1)
SODIUM BLD-SCNC: 134 MMOL/L (ref 136–145)
TOTAL PROTEIN: 5.2 G/DL (ref 6.6–8.7)
WBC # BLD: 17 K/UL (ref 4.8–10.8)

## 2022-03-10 PROCEDURE — 6370000000 HC RX 637 (ALT 250 FOR IP): Performed by: INTERNAL MEDICINE

## 2022-03-10 PROCEDURE — 6370000000 HC RX 637 (ALT 250 FOR IP): Performed by: HOSPITALIST

## 2022-03-10 PROCEDURE — 99223 1ST HOSP IP/OBS HIGH 75: CPT | Performed by: INTERNAL MEDICINE

## 2022-03-10 PROCEDURE — 36415 COLL VENOUS BLD VENIPUNCTURE: CPT

## 2022-03-10 PROCEDURE — 83735 ASSAY OF MAGNESIUM: CPT

## 2022-03-10 PROCEDURE — 2580000003 HC RX 258: Performed by: HOSPITALIST

## 2022-03-10 PROCEDURE — 6360000002 HC RX W HCPCS: Performed by: SURGERY

## 2022-03-10 PROCEDURE — 2500000003 HC RX 250 WO HCPCS: Performed by: STUDENT IN AN ORGANIZED HEALTH CARE EDUCATION/TRAINING PROGRAM

## 2022-03-10 PROCEDURE — 6360000002 HC RX W HCPCS: Performed by: INTERNAL MEDICINE

## 2022-03-10 PROCEDURE — 2580000003 HC RX 258: Performed by: INTERNAL MEDICINE

## 2022-03-10 PROCEDURE — 85025 COMPLETE CBC W/AUTO DIFF WBC: CPT

## 2022-03-10 PROCEDURE — 1210000000 HC MED SURG R&B

## 2022-03-10 PROCEDURE — 80053 COMPREHEN METABOLIC PANEL: CPT

## 2022-03-10 PROCEDURE — 83690 ASSAY OF LIPASE: CPT

## 2022-03-10 RX ORDER — POTASSIUM CHLORIDE 20 MEQ/1
40 TABLET, EXTENDED RELEASE ORAL DAILY
Status: DISCONTINUED | OUTPATIENT
Start: 2022-03-10 | End: 2022-03-11

## 2022-03-10 RX ADMIN — OXYCODONE AND ACETAMINOPHEN 1 TABLET: 10; 325 TABLET ORAL at 20:05

## 2022-03-10 RX ADMIN — PANCRELIPASE 12000 UNITS: 60000; 12000; 38000 CAPSULE, DELAYED RELEASE PELLETS ORAL at 11:13

## 2022-03-10 RX ADMIN — POTASSIUM CHLORIDE 20 MEQ: 1500 TABLET, EXTENDED RELEASE ORAL at 07:31

## 2022-03-10 RX ADMIN — SODIUM CHLORIDE, PRESERVATIVE FREE 10 ML: 5 INJECTION INTRAVENOUS at 07:31

## 2022-03-10 RX ADMIN — CLONIDINE HYDROCHLORIDE 0.1 MG: 0.1 TABLET ORAL at 13:38

## 2022-03-10 RX ADMIN — CETIRIZINE HYDROCHLORIDE 10 MG: 10 TABLET, FILM COATED ORAL at 16:56

## 2022-03-10 RX ADMIN — DOCUSATE SODIUM 100 MG: 100 CAPSULE, LIQUID FILLED ORAL at 07:31

## 2022-03-10 RX ADMIN — PANCRELIPASE 12000 UNITS: 60000; 12000; 38000 CAPSULE, DELAYED RELEASE PELLETS ORAL at 16:56

## 2022-03-10 RX ADMIN — FAMOTIDINE 20 MG: 10 INJECTION, SOLUTION INTRAVENOUS at 22:24

## 2022-03-10 RX ADMIN — MEROPENEM AND SODIUM CHLORIDE 1000 MG: 1 INJECTION, SOLUTION INTRAVENOUS at 16:58

## 2022-03-10 RX ADMIN — POLYETHYLENE GLYCOL 3350 17 G: 17 POWDER, FOR SOLUTION ORAL at 07:32

## 2022-03-10 RX ADMIN — CLONIDINE HYDROCHLORIDE 0.1 MG: 0.1 TABLET ORAL at 07:31

## 2022-03-10 RX ADMIN — SODIUM CHLORIDE: 9 INJECTION, SOLUTION INTRAVENOUS at 01:36

## 2022-03-10 RX ADMIN — MEROPENEM AND SODIUM CHLORIDE 1000 MG: 1 INJECTION, SOLUTION INTRAVENOUS at 07:34

## 2022-03-10 RX ADMIN — POTASSIUM CHLORIDE 40 MEQ: 1500 TABLET, EXTENDED RELEASE ORAL at 11:13

## 2022-03-10 RX ADMIN — SODIUM CHLORIDE: 9 INJECTION, SOLUTION INTRAVENOUS at 11:13

## 2022-03-10 RX ADMIN — CLONIDINE HYDROCHLORIDE 0.1 MG: 0.1 TABLET ORAL at 22:23

## 2022-03-10 RX ADMIN — TAMSULOSIN HYDROCHLORIDE 0.4 MG: 0.4 CAPSULE ORAL at 07:31

## 2022-03-10 RX ADMIN — ENOXAPARIN SODIUM 40 MG: 100 INJECTION SUBCUTANEOUS at 07:32

## 2022-03-10 RX ADMIN — FAMOTIDINE 20 MG: 10 INJECTION, SOLUTION INTRAVENOUS at 07:26

## 2022-03-10 ASSESSMENT — PAIN SCALES - GENERAL: PAINLEVEL_OUTOF10: 6

## 2022-03-10 NOTE — PROGRESS NOTES
HOSPITAL MEDICINE  - PROGRESS NOTE    Admit Date: 2/28/2022         CC: abd pain     Subjective: pain controlled, no N/V, had flatus, no chest pain, no dyspnea    Objective: no distress, NGT removed    19-year-old male with metastatic renal cell carcinoma followed at [facility], presented with severe epigastric and right upper quadrant pain progressively worsening over the past few days with radiation to the back, with lipase over 3000 and CT abdomen findings consistent with acute pancreatitis, also noted to have cholelithiasis with pericholecystic inflammation present concerning for cholecystitis. Findings concerning for gallstone pancreatitis. Patient maintained on IV fluids with pain control. Lipase within normal limits. General surgery consulted to evaluate for potential cholecystectomy. Plan for cholecystectomy on outpatient basis    todays CT abdomen shows persistent enlargement/edema of the head of the pancreas. Absent enhancement of the body of the pancreas suggesting necrosis. General surgery recommended transfer for higher level care    [facility] on call will NOT accept the patient since he is not established patient at [address] [place] [address]  The conversation was recorded. [facility] GI specialist on call stated at this point the necrosis is not infected and does not need drainage therefore he refused to accept the patient under his care stating we can provide here supportive care in the form of IVF and pain control. He was against using IV ab at this point and recommended discharge home with follow up with him as OP- they will call the patient directly. Adriana Ivette did not accept the transfer due to capacity issues    Diet: ADULT DIET; Clear Liquid;  Low Sodium (2 gm)  Pain is: none   Nausea:None  Bowel Movement/Flatus no    Data:   Scheduled Meds: Reviewed  Current Facility-Administered Medications   Medication Dose Route Frequency Provider Last Rate Last Admin    potassium chloride (KLOR-CON M) extended release tablet 40 mEq  40 mEq Oral Daily Dillon Crow MD        cloNIDine (CATAPRES) tablet 0.1 mg  0.1 mg Oral TID Dillon Crow MD   0.1 mg at 03/10/22 0731    meropenem (MERREM) 1000 mg in sodium chloride 0.9% 50 mL (duplex)  1,000 mg IntraVENous Q8H Vallorie Earnest,  mL/hr at 03/10/22 0734 1,000 mg at 03/10/22 0734    cetirizine (ZYRTEC) tablet 10 mg  10 mg Oral QPM Dillon Crow MD   10 mg at 03/09/22 1753    0.9 % sodium chloride infusion   IntraVENous Continuous Dillon Crow  mL/hr at 03/10/22 0136 New Bag at 03/10/22 0136    0.9 % sodium chloride infusion  25 mL IntraVENous PRN Dillon Crow MD        potassium chloride 10 mEq/100 mL IVPB (Peripheral Line)  10 mEq IntraVENous PRN Josefina Cross  mL/hr at 03/07/22 1344 10 mEq at 03/07/22 1344    bisacodyl (DULCOLAX) suppository 10 mg  10 mg Rectal Daily PRN Josefina Cross MD        tamsulosin (FLOMAX) capsule 0.4 mg  0.4 mg Oral Daily Josefina Cross MD   0.4 mg at 03/10/22 0731    polyethylene glycol (GLYCOLAX) packet 17 g  17 g Oral Daily Josefina Cross MD   17 g at 03/10/22 0732    docusate sodium (COLACE) capsule 100 mg  100 mg Oral Daily Josefina Cross MD   100 mg at 03/10/22 0731    oxyCODONE-acetaminophen (PERCOCET)  MG per tablet 1 tablet  1 tablet Oral Q4H PRN Patience Abbe, DO   1 tablet at 03/09/22 1914    HYDROmorphone HCl PF (DILAUDID) injection 0.5 mg  0.5 mg IntraVENous Q4H PRN Jessi Mckeon MD   0.5 mg at 03/08/22 0001    famotidine (PEPCID) injection 20 mg  20 mg IntraVENous BID Jessi Mckeon MD   20 mg at 03/10/22 0726    sodium chloride flush 0.9 % injection 5-40 mL  5-40 mL IntraVENous 2 times per day Patience Abbe, DO   10 mL at 03/10/22 0731    sodium chloride flush 0.9 % injection 5-40 mL  5-40 mL IntraVENous PRN Patience Abbe, DO        enoxaparin (LOVENOX) injection 40 mg  40 mg SubCUTAneous Daily Patience Abbe, DO   40 mg at 03/10/22 0732    ondansetron (ZOFRAN-ODT) disintegrating tablet 4 mg  4 mg Oral Q8H PRN Patience Abbe, DO        Or    ondansetron (ZOFRAN) injection 4 mg  4 mg IntraVENous Q6H PRN Patience Abbe, DO   4 mg at 03/03/22 1152    polyethylene glycol (GLYCOLAX) packet 17 g  17 g Oral Daily PRN Patience Abbe, DO        acetaminophen (TYLENOL) tablet 650 mg  650 mg Oral Q6H PRN Patience Abbe, DO        Or    acetaminophen (TYLENOL) suppository 650 mg  650 mg Rectal Q6H PRN Patience Abbe, DO         DVT Prophylaxis: Lovenox 40 mg sq daily    Continuous Infusions:   sodium chloride 125 mL/hr at 03/10/22 0136    sodium chloride         Intake/Output Summary (Last 24 hours) at 3/10/2022 0815  Last data filed at 3/10/2022 0645  Gross per 24 hour   Intake 3023 ml   Output --   Net 3023 ml     CBC:   Recent Labs     03/08/22  0327 03/09/22  0313 03/10/22  0257   WBC 16.2* 19.4* 17.0*   HGB 11.0* 11.3* 11.1*    293 301     BMP:  Recent Labs     03/08/22  0327 03/09/22  0313 03/10/22  0257    137 134*   K 3.6 3.3* 3.1*    100 101   CO2 22 23 22   BUN 13 11 10   CREATININE 0.8 0.9 0.7   GLUCOSE 172* 151* 138*     ABGs: No results found for: PHART, PO2ART, GFC5JKM  INR: No results for input(s): INR in the last 72 hours. Objective:   Vitals: BP (!) 149/65   Pulse 101   Temp 98.6 °F (37 °C) (Temporal)   Resp 18   Ht 6' 1\" (1.854 m)   Wt 210 lb 14.4 oz (95.7 kg)   SpO2 92%   BMI 27.82 kg/m²   General appearance: alert, appears stated age and cooperative  Skin: Skin color, texture, turgor normal.   HEENT: Head: Normocephalic, no lesions, without obvious abnormality, NGT in place.   Neck: no adenopathy, no carotid bruit, no JVD and supple, symmetrical, trachea midline  Lungs: clear to auscultation bilaterally  Heart: regular rate and rhythm, S1, S2 normal, no murmur, click, rub or gallop  Abdomen: soft, non-tender; bowel sounds normal; no masses,  no organomegaly  Extremities: extremities normal, atraumatic, no cyanosis or edema  Lymphatic: No significant lymph node enlargement papable  Neurologic: Mental status: Alert, oriented, thought content appropriate        Assessment & Plan:    · Pancreatitis, gallstone- consulted GI   · Cholelithiasis  · Leukocytosis - on Merrem per general surgery  · Anemia- stable   · Hypokalemia- daily K  · Partial SBO- treated with NGT  · 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 [facility] [address] will NOT accept the patient since he is not established patient at [facility]  The conversation was recorded. [facility] GI specialist on call stated at this point the necrosis is not infected and does not need drainage therefore he refused to accept the patient under his care stating we can provide here supportive care in the form of IVF and pain control. He was against using IV ab at this point.  They recommended discharge home and follow up as OP with them     [address] [facility] did not accept the transfer due to capacity issues

## 2022-03-10 NOTE — CONSULTS
[facility] OF [facility] RADHA Pace [address], [address] [facility]                                  CONSULTATION    PATIENT NAME: Dave Lagos                    :        1950  MED REC NO:   [id number]                              ROOM:       [facility]  ACCOUNT NO:   [de-identified]                           ADMIT DATE: 2022  PROVIDER:     Sandi Espinosa MD    CONSULT DATE:  03/10/2022    ASSESSMENT:  1. Acute necrotizing pancreatitis clinically and biochemically  improving with a single gallstone in the gallbladder, but no gallbladder  wall thickening or common bile duct dilatation. There is persistent  leukocytosis with increased segs, but no spiking fever or chills. 2.  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 The patient subsequently underwent adrenalectomy for metastatic disease  as well. The patient has been monitored with a CT of the abdomen every  6 months and now yearly for \"spots on the pancreas\" which have seemingly  unchanged. RECOMMENDATIONS:  1. The case was discussed with Dr. Wm Sandy, surgeon and I recommended  that the patient be empirically treated with meropenem IV. 2.  Continue Pepcid orally since the patient is seemingly allergic to  proton pump inhibitor agent by history. 3.  Start nutritional support with boost or Ensure and add Creon with  meals. I anticipate that the patient will eventually be able to have a  low-fat, low-cholesterol diet. 4.  Order a followup CAT scan of the abdomen in a few days after the  patient has had meropenem to make sure that there is no interval  formation of a pseudocyst or other complications of his necrotizing  pancreatitis. 6.  I agree with the current conservative medical management of his  pancreatitis.   Later evaluation with endoscopic ultrasound or MRCP may  be considered in 1 to 2 months, then pursue cholecystectomy. 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 He also has some arthritis pain and stiffness for which he  had been taking NSAID every other day. He has the above history of  metastatic renal cell carcinoma which was removed with partial  nephrectomy of the left kidney in 10/2017, with followup resection of  the left adrenal approximately 1 year later. The patient has been  followed at [facility] every 6 months with followup MRI of the brain  and CT of the abdomen with no definite new metastatic disease. However,  the patient states that they have been \"following spots on the  pancreas. \"    The patient has no prior history of hepatobiliary complaints or  pancreatitis in the past until he developed sudden onset of epigastric  abdominal pain, nausea and vomiting on 02/28/2022, for which he was  admitted and found to have acute pancreatitis on CT of the abdomen with  elevated lipase greater than 3000. Ultrasound of the gallbladder showed  a 15 mm gallstone, but no common bile duct dilatation. There was an  initial ileus which was managed with gut rest and NG tube decompression. The patient had conservative medical management of his pancreatitis and  has been progressively improving with return of bowel sounds, removal of  the nasogastric tube, and initiation of clear liquid diet. The initial  elevated serum lipase levels for 2 days eventually normalized on  03/05/2022. Liver function tests were essentially normal on  presentation, except for a slightly elevated bilirubin of 1.3, but  normal transaminase levels. Bilirubin subsequently normalized on  03/08/2022.   The acute pancreatitis had no clinical or biochemical  features of anemia or acute renal failure. The patient is now having  improving epigastric pain and is tolerating a clear liquid diet. There  was an elevated WBC 19.4 with 83.2 neutrophils and therefore meropenem  was felt appropriate to help the patient the healing of the acute  pancreatic phlegmon and prevent abscess. Current CT of the abdomen on  03/09/2022, showed persistent enlargement and edema of the head of the  pancreas. There is absent enhancement of the body of the pancreas  suggesting necrosis. There is increasing peripancreatic retroperitoneal  fat inflammation and increasing fluid accumulation in the lesser sac  causing some displacement of the stomach and transverse colon. There is  a small amount of abdominal and pelvic ascites. The gallbladder is  without evidence of cholecystitis on previous or present studies. Most  importantly, there is no mesenteric adenopathy. PAST MEDICAL HISTORY:  Renal cell carcinoma as discussed above, mild  hypertension. SURGICAL HISTORY:  Partial left nephrectomy and subsequent adrenalectomy  in 2017. ALLERGIES TO MEDICATIONS:  [address] and [address] CURRENT MAINTENANCE MEDICATIONS:  Reviewed in the hospital medication  list.    SOCIAL HISTORY:  The patient is . He has no habits of tobacco or  alcohol abuse. There is no background history of liver disease or  jaundice in the past.    FAMILY HISTORY:  Mother and sister had breast cancer. Brother had  cancer of unknown type. PHYSICAL EXAMINATION:  VITAL SIGNS:  Afebrile. Vital signs normal.  GENERAL:  No acute distress. Sitting up in bed. Feeding at breakfast  time without exacerbation of abdominal pain. HEENT:  Sclerae white. Conjunctivae pink. Buccal mucosa moist.  NECK:  Supple. LUNGS:  Mild decreased breath sounds in the lung bases, possibly due to  small bilateral pleural effusions. ABDOMEN:  Bowel sounds present. Mild distention of the abdomen. No  rebound or peritoneal signs.   EXTREMITIES:  No ankle Conchis Ellison MD    D: 03/10/2022 10:50:16      T: 03/10/2022 10:58:08     [email or web address]  Job#: [id number]     Doc#: [id number]    CC:

## 2022-03-11 LAB
ALBUMIN SERPL-MCNC: 2.4 G/DL (ref 3.5–5.2)
ALP BLD-CCNC: 67 U/L (ref 40–130)
ALT SERPL-CCNC: 30 U/L (ref 5–41)
ANION GAP SERPL CALCULATED.3IONS-SCNC: 10 MMOL/L (ref 7–19)
AST SERPL-CCNC: 38 U/L (ref 5–40)
BASOPHILS ABSOLUTE: 0 K/UL (ref 0–0.2)
BASOPHILS RELATIVE PERCENT: 0.2 % (ref 0–1)
BILIRUB SERPL-MCNC: 0.4 MG/DL (ref 0.2–1.2)
BUN BLDV-MCNC: 8 MG/DL (ref 8–23)
CALCIUM SERPL-MCNC: 7.3 MG/DL (ref 8.8–10.2)
CHLORIDE BLD-SCNC: 108 MMOL/L (ref 98–111)
CO2: 22 MMOL/L (ref 22–29)
CREAT SERPL-MCNC: 0.7 MG/DL (ref 0.5–1.2)
EOSINOPHILS ABSOLUTE: 0.1 K/UL (ref 0–0.6)
EOSINOPHILS RELATIVE PERCENT: 0.6 % (ref 0–5)
GFR AFRICAN AMERICAN: >59
GFR NON-AFRICAN AMERICAN: >60
GLUCOSE BLD-MCNC: 137 MG/DL (ref 74–109)
HCT VFR BLD CALC: 32.1 % (ref 42–52)
HEMOGLOBIN: 10.2 G/DL (ref 14–18)
IMMATURE GRANULOCYTES #: 0.1 K/UL
LIPASE: 32 U/L (ref 13–60)
LYMPHOCYTES ABSOLUTE: 1.4 K/UL (ref 1.1–4.5)
LYMPHOCYTES RELATIVE PERCENT: 11.3 % (ref 20–40)
MCH RBC QN AUTO: 30.1 PG (ref 27–31)
MCHC RBC AUTO-ENTMCNC: 31.8 G/DL (ref 33–37)
MCV RBC AUTO: 94.7 FL (ref 80–94)
MONOCYTES ABSOLUTE: 0.9 K/UL (ref 0–0.9)
MONOCYTES RELATIVE PERCENT: 7.5 % (ref 0–10)
NEUTROPHILS ABSOLUTE: 9.9 K/UL (ref 1.5–7.5)
NEUTROPHILS RELATIVE PERCENT: 79.5 % (ref 50–65)
PDW BLD-RTO: 14.5 % (ref 11.5–14.5)
PLATELET # BLD: 342 K/UL (ref 130–400)
PMV BLD AUTO: 10.2 FL (ref 9.4–12.4)
POTASSIUM REFLEX MAGNESIUM: 3.6 MMOL/L (ref 3.5–5)
RBC # BLD: 3.39 M/UL (ref 4.7–6.1)
SODIUM BLD-SCNC: 140 MMOL/L (ref 136–145)
TOTAL PROTEIN: 4.2 G/DL (ref 6.6–8.7)
WBC # BLD: 12.4 K/UL (ref 4.8–10.8)

## 2022-03-11 PROCEDURE — 87324 CLOSTRIDIUM AG IA: CPT

## 2022-03-11 PROCEDURE — 87449 NOS EACH ORGANISM AG IA: CPT

## 2022-03-11 PROCEDURE — 80053 COMPREHEN METABOLIC PANEL: CPT

## 2022-03-11 PROCEDURE — 2500000003 HC RX 250 WO HCPCS: Performed by: STUDENT IN AN ORGANIZED HEALTH CARE EDUCATION/TRAINING PROGRAM

## 2022-03-11 PROCEDURE — 2580000003 HC RX 258: Performed by: INTERNAL MEDICINE

## 2022-03-11 PROCEDURE — 6370000000 HC RX 637 (ALT 250 FOR IP): Performed by: INTERNAL MEDICINE

## 2022-03-11 PROCEDURE — 99233 SBSQ HOSP IP/OBS HIGH 50: CPT | Performed by: SURGERY

## 2022-03-11 PROCEDURE — 85025 COMPLETE CBC W/AUTO DIFF WBC: CPT

## 2022-03-11 PROCEDURE — 6370000000 HC RX 637 (ALT 250 FOR IP): Performed by: HOSPITALIST

## 2022-03-11 PROCEDURE — 6360000002 HC RX W HCPCS: Performed by: INTERNAL MEDICINE

## 2022-03-11 PROCEDURE — 83690 ASSAY OF LIPASE: CPT

## 2022-03-11 PROCEDURE — 6360000002 HC RX W HCPCS: Performed by: SURGERY

## 2022-03-11 PROCEDURE — 1210000000 HC MED SURG R&B

## 2022-03-11 PROCEDURE — 36415 COLL VENOUS BLD VENIPUNCTURE: CPT

## 2022-03-11 RX ORDER — LOPERAMIDE HYDROCHLORIDE 2 MG/1
2 CAPSULE ORAL 4 TIMES DAILY PRN
Status: DISCONTINUED | OUTPATIENT
Start: 2022-03-11 | End: 2022-03-12 | Stop reason: HOSPADM

## 2022-03-11 RX ORDER — POTASSIUM CHLORIDE 20 MEQ/1
20 TABLET, EXTENDED RELEASE ORAL DAILY
Status: DISCONTINUED | OUTPATIENT
Start: 2022-03-12 | End: 2022-03-12 | Stop reason: HOSPADM

## 2022-03-11 RX ADMIN — MEROPENEM AND SODIUM CHLORIDE 1000 MG: 1 INJECTION, SOLUTION INTRAVENOUS at 15:43

## 2022-03-11 RX ADMIN — OXYCODONE AND ACETAMINOPHEN 1 TABLET: 10; 325 TABLET ORAL at 21:20

## 2022-03-11 RX ADMIN — PANCRELIPASE 12000 UNITS: 60000; 12000; 38000 CAPSULE, DELAYED RELEASE PELLETS ORAL at 13:40

## 2022-03-11 RX ADMIN — MEROPENEM AND SODIUM CHLORIDE 1000 MG: 1 INJECTION, SOLUTION INTRAVENOUS at 08:48

## 2022-03-11 RX ADMIN — CLONIDINE HYDROCHLORIDE 0.1 MG: 0.1 TABLET ORAL at 21:20

## 2022-03-11 RX ADMIN — PANCRELIPASE 12000 UNITS: 60000; 12000; 38000 CAPSULE, DELAYED RELEASE PELLETS ORAL at 08:46

## 2022-03-11 RX ADMIN — CLONIDINE HYDROCHLORIDE 0.1 MG: 0.1 TABLET ORAL at 15:43

## 2022-03-11 RX ADMIN — FAMOTIDINE 20 MG: 10 INJECTION, SOLUTION INTRAVENOUS at 21:20

## 2022-03-11 RX ADMIN — ENOXAPARIN SODIUM 40 MG: 100 INJECTION SUBCUTANEOUS at 08:46

## 2022-03-11 RX ADMIN — POTASSIUM CHLORIDE 40 MEQ: 1500 TABLET, EXTENDED RELEASE ORAL at 08:46

## 2022-03-11 RX ADMIN — PANCRELIPASE 12000 UNITS: 60000; 12000; 38000 CAPSULE, DELAYED RELEASE PELLETS ORAL at 18:18

## 2022-03-11 RX ADMIN — MEROPENEM AND SODIUM CHLORIDE 1000 MG: 1 INJECTION, SOLUTION INTRAVENOUS at 22:14

## 2022-03-11 RX ADMIN — FAMOTIDINE 20 MG: 10 INJECTION, SOLUTION INTRAVENOUS at 08:46

## 2022-03-11 RX ADMIN — OXYCODONE AND ACETAMINOPHEN 1 TABLET: 10; 325 TABLET ORAL at 13:40

## 2022-03-11 RX ADMIN — TAMSULOSIN HYDROCHLORIDE 0.4 MG: 0.4 CAPSULE ORAL at 08:45

## 2022-03-11 RX ADMIN — CETIRIZINE HYDROCHLORIDE 10 MG: 10 TABLET, FILM COATED ORAL at 18:18

## 2022-03-11 RX ADMIN — MEROPENEM AND SODIUM CHLORIDE 1000 MG: 1 INJECTION, SOLUTION INTRAVENOUS at 01:45

## 2022-03-11 RX ADMIN — SODIUM CHLORIDE, PRESERVATIVE FREE 10 ML: 5 INJECTION INTRAVENOUS at 21:20

## 2022-03-11 RX ADMIN — ACETAMINOPHEN 650 MG: 325 TABLET ORAL at 21:20

## 2022-03-11 ASSESSMENT — PAIN SCALES - GENERAL
PAINLEVEL_OUTOF10: 4
PAINLEVEL_OUTOF10: 3

## 2022-03-11 NOTE — PROGRESS NOTES
[facility] General Surgery Progress Note    Chief Complaint:   Chief Complaint   Patient presents with    Abdominal Pain     all over but worse in LRQ       SUBJECTIVE:  Mr. Juan Jose Lunsford is a 73 y.o. male is seen and examined at bedside. Doing well. Denies pain. OBJECTIVE:  BP (!) 145/73   Pulse 96   Temp 97.7 °F (36.5 °C) (Temporal)   Resp 18   Ht 6' 1\" (1.854 m)   Wt 210 lb 14.4 oz (95.7 kg)   SpO2 96%   BMI 27.82 kg/m²   CONSTITUTIONAL: Alert, appropriate, no acute distress  SKIN: warm, dry with no rashes or lesions  HEENT: NCAT, Non icteric, PERR. Trachea Midline. NG in place  CHEST/LUNGS: CTA bilaterally. Normal respiratory effort. CARDIOVASCULAR: RRR, No edema. ABDOMEN: No TTP noted  NEUROLOGIC: CN II-XI grossly intact, no motor or sensory deficits   MUSCULOSKELETAL: No clubbing or cyanosis. PSYCHIATRIC:  Normal Mood and Affect. Alert and Oriented. Labs:  CBC:   Recent Labs     03/09/22 0313 03/10/22  0257 03/11/22  0339   WBC 19.4* 17.0* 12.4*   HGB 11.3* 11.1* 10.2*    301 342     BMP:    Recent Labs     03/09/22 0313 03/10/22  0257 03/11/22  0339    134* 140   K 3.3* 3.1* 3.6    101 108   CO2 23 22 22   BUN 11 10 8   CREATININE 0.9 0.7 0.7   GLUCOSE 151* 138* 137*       ASSESSMENT:  Principal Problem:    Pancreatitis, gallstone  Active Problems:    Acute pancreatitis    Cholelithiasis    Dehydration    Abdominal pain  Resolved Problems:    * No resolved hospital problems.  *      PLAN:    Primary team attempted to transfer secondary to pancreatic necrosis  Tertiary care either unable to accept the recommended outpatient follow-up  Continue aggressive antibiotics  Improvement in leukocytosis noted today  Outpatient cholecystectomy in 4 to 6 weeks if resolution of pancreatitis    Advance diet  If tolerates advanced diet and Leukocytosis improved tommelissa, would recommend GLORIA Li DO   Electronically Signed on 3/11/2022 at 4:57 PM

## 2022-03-11 NOTE — PROGRESS NOTES
HOSPITAL MEDICINE  - PROGRESS NOTE    Admit Date: 2/28/2022         CC: abd pain     Subjective: pain controlled, no N/V, no chest pain, no dyspnea, complains of diarrhea    Objective: mild distress, NGT removed    17-year-old male with metastatic renal cell carcinoma followed at [facility], presented with severe epigastric and right upper quadrant pain progressively worsening over the past few days with radiation to the back, with lipase over 3000 and CT abdomen findings consistent with acute pancreatitis, also noted to have cholelithiasis with pericholecystic inflammation present concerning for cholecystitis. Findings concerning for gallstone pancreatitis. Patient maintained on IV fluids with pain control. Lipase within normal limits. General surgery consulted to evaluate for potential cholecystectomy. Plan for cholecystectomy on outpatient basis    todays CT abdomen shows persistent enlargement/edema of the head of the pancreas. Absent enhancement of the body of the pancreas suggesting necrosis. General surgery recommended transfer for higher level care    [facility] on call will NOT accept the patient since he is not established patient at [facility]  The conversation was recorded. [facility] GI specialist on call stated at this point the necrosis is not infected and does not need drainage therefore he refused to accept the patient under his care stating we can provide here supportive care in the form of IVF and pain control. He was against using IV ab at this point and recommended discharge home with follow up with him as OP- they will call the patient directly. [facility] did not accept the transfer due to capacity issues    Diet: ADULT DIET; Clear Liquid;  Low Sodium (2 gm)  ADULT ORAL NUTRITION SUPPLEMENT; Breakfast, Lunch, Dinner; Clear Liquid Oral Supplement  Pain is: none   Nausea:None  Bowel Movement/Flatus yes    Data:   Scheduled Meds: Reviewed  Current Facility-Administered Medications   Medication Dose Route Frequency Provider Last Rate Last Admin    potassium chloride (KLOR-CON M) extended release tablet 40 mEq  40 mEq Oral Daily Inna Rankin MD   40 mEq at 03/10/22 1113    lipase-protease-amylase (CREON) delayed release capsule 12,000 Units  12,000 Units Oral TID  Hudson Patel MD   12,000 Units at 03/10/22 1656    cloNIDine (CATAPRES) tablet 0.1 mg  0.1 mg Oral TID Inna Rankin MD   0.1 mg at 03/10/22 2223    meropenem (MERREM) 1000 mg in sodium chloride 0.9% 50 mL (duplex)  1,000 mg IntraVENous Q8H Vicalma delia Francoap,    Stopped at 03/11/22 0323    cetirizine (ZYRTEC) tablet 10 mg  10 mg Oral QPM Inna Rankin MD   10 mg at 03/10/22 1656    0.9 % sodium chloride infusion   IntraVENous Continuous Inna Rankin  mL/hr at 03/10/22 1113 New Bag at 03/10/22 1113    0.9 % sodium chloride infusion  25 mL IntraVENous PRN Inna Rankin MD        potassium chloride 10 mEq/100 mL IVPB (Peripheral Line)  10 mEq IntraVENous PRN Luciana Berumen  mL/hr at 03/07/22 1344 10 mEq at 03/07/22 1344    bisacodyl (DULCOLAX) suppository 10 mg  10 mg Rectal Daily PRN Luciana Berumen MD        tamsulosin (FLOMAX) capsule 0.4 mg  0.4 mg Oral Daily Luciana Berumen MD   0.4 mg at 03/10/22 0731    polyethylene glycol (GLYCOLAX) packet 17 g  17 g Oral Daily Luciana Berumen MD   17 g at 03/10/22 0732    docusate sodium (COLACE) capsule 100 mg  100 mg Oral Daily Luciana Berumen MD   100 mg at 03/10/22 0731    oxyCODONE-acetaminophen (PERCOCET)  MG per tablet 1 tablet  1 tablet Oral Q4H PRN Patience Abbe, DO   1 tablet at 03/10/22 2005    HYDROmorphone HCl PF (DILAUDID) injection 0.5 mg  0.5 mg IntraVENous Q4H PRN Mulu Baptiste MD   0.5 mg at 03/08/22 0001    famotidine (PEPCID) injection 20 mg  20 mg IntraVENous BID Mulu Baptiste MD 20 mg at 03/10/22 2224    sodium chloride flush 0.9 % injection 5-40 mL  5-40 mL IntraVENous 2 times per day Patience Abbe, DO   10 mL at 03/10/22 0731    sodium chloride flush 0.9 % injection 5-40 mL  5-40 mL IntraVENous PRN Patience Abbe, DO        enoxaparin (LOVENOX) injection 40 mg  40 mg SubCUTAneous Daily Patience Abbe, DO   40 mg at 03/10/22 0732    ondansetron (ZOFRAN-ODT) disintegrating tablet 4 mg  4 mg Oral Q8H PRN Patience Abbe, DO        Or    ondansetron (ZOFRAN) injection 4 mg  4 mg IntraVENous Q6H PRN Patience Abbe, DO   4 mg at 03/03/22 1152    polyethylene glycol (GLYCOLAX) packet 17 g  17 g Oral Daily PRN Patience Abbe, DO        acetaminophen (TYLENOL) tablet 650 mg  650 mg Oral Q6H PRN Patience Abbe, DO        Or    acetaminophen (TYLENOL) suppository 650 mg  650 mg Rectal Q6H PRN Patience Abbe, DO         DVT Prophylaxis: Lovenox 40 mg sq daily    Continuous Infusions:   sodium chloride 125 mL/hr at 03/10/22 1113    sodium chloride         Intake/Output Summary (Last 24 hours) at 3/11/2022 0820  Last data filed at 3/10/2022 1400  Gross per 24 hour   Intake 1220 ml   Output --   Net 1220 ml     CBC:   Recent Labs     03/09/22  0313 03/10/22  0257 03/11/22  0339   WBC 19.4* 17.0* 12.4*   HGB 11.3* 11.1* 10.2*    301 342     BMP:  Recent Labs     03/09/22  0313 03/10/22  0257 03/11/22  0339    134* 140   K 3.3* 3.1* 3.6    101 108   CO2 23 22 22   BUN 11 10 8   CREATININE 0.9 0.7 0.7   GLUCOSE 151* 138* 137*     ABGs: No results found for: PHART, PO2ART, CCS0TBX  INR: No results for input(s): INR in the last 72 hours.       Objective:   Vitals: /66   Pulse 89   Temp 96.9 °F (36.1 °C) (Temporal)   Resp 18   Ht 6' 1\" (1.854 m)   Wt 210 lb 14.4 oz (95.7 kg)   SpO2 95%   BMI 27.82 kg/m²   General appearance: alert, appears stated age and cooperative  Skin: Skin color, texture, turgor normal.   HEENT: Head: Normocephalic, no lesions, without obvious abnormality, NGT in place. Neck: no adenopathy, no carotid bruit, no JVD and supple, symmetrical, trachea midline  Lungs: clear to auscultation bilaterally  Heart: regular rate and rhythm, S1, S2 normal, no murmur, click, rub or gallop  Abdomen: soft, non-tender; bowel sounds normal; no masses,  no organomegaly  Extremities: extremities normal, atraumatic, no cyanosis or edema  Lymphatic: No significant lymph node enlargement papable  Neurologic: Mental status: Alert, oriented, thought content appropriate        Assessment & Plan:    · Pancreatitis, gallstone- GI following  · Cholelithiasis  · Leukocytosis - on Merrem   · Anemia   · Hypokalemia- daily K  · Partial SBO- treated with NGT  · Necrotizing pancreatitis- general surgery recommended transfer - nobody will accept this transfer at this point _ will if there is def surgical indication   · Diarrhea- rule out C diff          Juan Fine MD     [address] physician will NOT accept the patient since he is not established patient at [facility]  The conversation was recorded. [facility] GI specialist on call stated at this point the necrosis is not infected and does not need drainage therefore he refused to accept the patient under his care stating we can provide here supportive care in the form of IVF and pain control. He was against using IV ab at this point.  They recommended discharge home and follow up as OP with them     [address] did not accept the transfer due to capacity issues

## 2022-03-11 NOTE — ADT AUTH CERT
3/10/2022 clinical by Hermelinda Mao RN       Review Status Review Entered   In Primary 3/11/2022 15:29      Criteria Review   Date of care: 3/10/2022     -LOC-[facility] Medicine PN: Subjective: pain controlled, no N/V, had flatus, no chest pain, no dyspnea  General appearance: alert, appears stated age and cooperative  Skin: Skin color, texture, turgor normal.   HEENT: Head: Normocephalic, no lesions, without obvious abnormality, NGT in place. Neck: no adenopathy, no carotid bruit, no JVD and supple, symmetrical, trachea midline  Lungs: clear to auscultation bilaterally  Heart: regular rate and rhythm, S1, S2 normal, no murmur, click, rub or gallop  Abdomen: soft, non-tender; bowel sounds normal; no masses,  no organomegaly  Extremities: extremities normal, atraumatic, no cyanosis or edema  Lymphatic: No significant lymph node enlargement papable  Neurologic: Mental status: Alert, oriented, thought content appropriate  Assessment & Plan:   ·                                Pancreatitis, gallstone- consulted GI   ·                                Cholelithiasis  ·                                Leukocytosis - on Merrem per general surgery  ·                                Anemia- stable   ·                                Hypokalemia- daily K  ·                                Partial SBO- treated with NGT  ·                                Necrotizing pancreatitis- general surgery recommended transfer - nobody will accept this transfer at this point     GI consult: ASSESSMENT:  1. Acute necrotizing pancreatitis clinically and biochemically  improving with a single gallstone in the gallbladder, but no gallbladder  wall thickening or common bile duct dilatation. There is persistent  leukocytosis with increased segs, but no spiking fever or chills.   2.  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 The patient subsequently underwent adrenalectomy for metastatic disease  as well. The patient has been monitored with a CT of the abdomen every  6 months and now yearly for \"spots on the pancreas\" which have seemingly  unchanged. RECOMMENDATIONS:  1. The case was discussed with Dr. Woodrow Johnson, surgeon and I recommended  that the patient be empirically treated with meropenem IV. 2.  Continue Pepcid orally since the patient is seemingly allergic to  proton pump inhibitor agent by history. 3.  Start nutritional support with boost or Ensure and add Creon with  meals. I anticipate that the patient will eventually be able to have a  low-fat, low-cholesterol diet. 4.  Order a followup CAT scan of the abdomen in a few days after the  patient has had meropenem to make sure that there is no interval  formation of a pseudocyst or other complications of his necrotizing  pancreatitis. 6.  I agree with the current conservative medical management of his  pancreatitis.   Later evaluation with endoscopic ultrasound or MRCP may  be considered in 1 to 2 months, then pursue cholecystectomy.     Gen surg PN: ASSESSMENT:  Principal Problem:    Pancreatitis, gallstone  Active Problems:    Acute pancreatitis    Cholelithiasis    Dehydration    Abdominal pain  PLAN:  Primary team attempted to transfer secondary to pancreatic necrosis  Tertiary care either unable to accept the recommended outpatient follow-up  Continue aggressive antibiotics  Improvement in leukocytosis noted today  Diet per GI  Outpatient cholecystectomy in 4 to 6 weeks if resolution of pancreatitis     Vitals: Temp 98.6, , /65, RR 18, 94% on RA     Labs:  3/10/2022 02:57  Sodium: 134 (L)  Potassium: 3.1 (L)  CO2: 22  BUN,BUNPL: 10  Magnesium: 2.3  GLUCOSE, FASTING,GF: 138 (H)  CALCIUM, SERUM, 942410: 7.6 (L)  Total Protein: 5.2 (L)  Albumin: 2.5 (L)  Bilirubin: 0.7  Lipase: 27  WBC: 17.0 (H)  RBC: 3.63 (L)  Hemoglobin Quant: 11.1 (L)  Hematocrit: 34.0 (L)  MCV: 93.7  MCHC: 32.6 (L)  RDW: 14.5  Platelet Count: 808  Neutrophils %: 83.3 (H)  Lymphocyte %: 7.6 (L)  Monocytes %: 7.1  Neutrophils Absolute: 14.2 (H)  Lymphocytes Absolute: 1.3  Monocytes Absolute: 1.20 (H)     Medications:   Zyrtec 10mg PO every evening  Clonidine 0.1mg PO TID  Colace 100mg PO daily  Lovenox 40mg SC daily  Pepcid 20mg IV BID  Creon 12,000units PO TID with meals  Meropenem 1,000mg IV q8  Glycolax 17g PO daily  Potassium chloride 20mEq PO daily  Potassium chloride 40mEq PO daily  Flomax 0.4mg PO daily  NS 125mL/hr IV  Percocet 10-325mg per tablet 1 tab PO q4 PRN x1     Plan: daily CBC, daily CMP, daily lipase, adult clear liquid diet 2gNa with clear liquid supplement, catheter care, NG tube, NG maintenance, remote cardiac monitor, up as tolerated                    Pancreatitis - Care Day 12 (3/11/2022) by Veronica Akhtar RN       Review Status Review Entered   Completed 3/11/2022 15:24      Criteria Review      Care Day: 12 Care Date: 3/11/2022 Level of Care: Inpatient Floor    Guideline Day 2    Level Of Care    (X) ICU or floor    3/11/2022 4:24 PM EST by Jennifer Leal      medical    Clinical Status    (X) * Hemodynamic stability    3/11/2022 4:24 PM EST by Jennifer Leal      VSS    (X) * Pain absent or reduced    3/11/2022 4:24 PM EST by Jennifer Leal      Pain 4/10  Percocet 10-325mg per tablet 1 tab PO q4 PRN x1    Activity    (X) Activity as tolerated    3/11/2022 4:24 PM EST by Jennifer Leal      up as tolerated    Routes    (X) Possible oral hydration    3/11/2022 4:24 PM EST by Jennifer Leal      adult clear liquid diet 2gNa with clear liquid supplement    (X) Possible oral medications    3/11/2022 4:24 PM EST by Suzzanne Hacker      PO meds    (X) Oral diet as tolerated    3/11/2022 4:24 PM EST by Jennifer Leal      adult clear liquid diet 2gNa with clear liquid supplement Interventions    (X) * NG tube absent    3/11/2022 4:24 PM EST by Tess Ponce      none noted    (X) Laboratory tests    3/11/2022 4:24 PM EST by Tess Ponce      see below    * Milestone   Additional Notes   Date of care: 3/11/2022      -LOC-[facility] PN: Subjective: pain controlled, no N/V, no chest pain, no dyspnea, complains of diarrhea   General appearance: alert, appears stated age and cooperative   Skin: Skin color, texture, turgor normal.    HEENT: Head: Normocephalic, no lesions, without obvious abnormality, NGT in place.    Neck: no adenopathy, no carotid bruit, no JVD and supple, symmetrical, trachea midline   Lungs: clear to auscultation bilaterally   Heart: regular rate and rhythm, S1, S2 normal, no murmur, click, rub or gallop   Abdomen: soft, non-tender; bowel sounds normal; no masses,  no organomegaly   Extremities: extremities normal, atraumatic, no cyanosis or edema   Lymphatic: No significant lymph node enlargement papable   Neurologic: Mental status: Alert, oriented, thought content appropriate   Assessment & Plan:    · Pancreatitis, gallstone- GI following   · Cholelithiasis   · Leukocytosis - on Merrem    · Anemia    · Hypokalemia- daily K   · Partial SBO- treated with NGT   · Necrotizing pancreatitis- general surgery recommended transfer - nobody will accept this transfer at this point _ will if there is def surgical indication    · Diarrhea- rule out C diff      Vitals: Temp 98.1, HR 94, /65, RR 18, 96% on RA      Labs:   3/11/2022 03:39   Sodium: 140   Potassium: 3.6   CO2: 22   BUN,BUNPL: 8   GLUCOSE, FASTING,GF: 137 (H)   CALCIUM, SERUM, [id number]: 7.3 (L)   Total Protein: 4.2 (L)   Albumin: 2.4 (L)   Bilirubin: 0.4   Lipase: 32   WBC: 12.4 (H)   RBC: 3.39 (L)   Hemoglobin Quant: 10.2 (L)   Hematocrit: 32.1 (L)   MCV: 94.7 (H)   MCHC: 31.8 (L)   RDW: 14.5   Platelet Count: 295   Neutrophils %: 79.5 (H)   Lymphocyte %: 11.3 (L)   Monocytes %: 7.5 Neutrophils Absolute: 9.9 (H)   Lymphocytes Absolute: 1.4   Monocytes Absolute: 0.90      Medications:    Zyrtec 10mg PO every evening   Clonidine 0.1mg PO TID   Lovenox 40mg SC daily   Pepcid 20mg IV BID   Creon 12,000units PO TID with meals   Meropenem 1,000mg IV q8   Glycolax 17g PO daily   Potassium chloride 40mEq PO daily   Flomax 0.4mg PO daily   Percocet 10-325mg per tablet 1 tab PO q4 PRN x1      Plan: daily CBC, daily CMP, daily lipase, adult clear liquid diet 2gNa with clear liquid supplement, catheter care, NG tube, NG maintenance, remote cardiac monitor, up as tolerated

## 2022-03-11 NOTE — PROGRESS NOTES
Comprehensive Nutrition Assessment    Type and Reason for Visit:  Reassess    Nutrition Assessment:  PO intake has improved. Pt is tolerating Clear Liquid diet. Labs indicate good nutritional status. Will continue to monitor intakes/tolerance and implement further nutrition intervention as needed. Malnutrition Assessment:  Malnutrition Status: At risk for malnutrition (Comment)    Context:  Acute Illness     Findings of the 6 clinical characteristics of malnutrition:  Energy Intake:  7 - 50% or less of estimated energy requirements for 5 or more days  Weight Loss:  No significant weight loss     Body Fat Loss:  No significant body fat loss     Muscle Mass Loss:  No significant muscle mass loss    Fluid Accumulation:  No significant fluid accumulation     Strength:  Not Performed    Estimated Daily Nutrient Needs:  Energy (kcal):  8011-6076 kcal/d (20-25 kcal/kg); Weight Used for Energy Requirements:  Current   Protein (g):  109-168 g/d (1.3-2.0 g/kg IBW); Weight Used for Protein Requirements:  Ideal        Fluid (ml/day):  9105-0720 ml/d; Method Used for Fluid Requirements:  1 ml/kcal      Current Nutrition Therapies:    ADULT DIET; Clear Liquid; Low Sodium (2 gm)  ADULT ORAL NUTRITION SUPPLEMENT; Breakfast, Lunch, Dinner; Clear Liquid Oral Supplement    Anthropometric Measures:  · Height: 6' 1\" (185.4 cm)  · Current Body Weight: 210 lb (95.3 kg)   · Ideal Body Weight: 184 lbs  · BMI: 27.7  · BMI Categories: Overweight (BMI 25.0-29. 9)       Nutrition Diagnosis:   · Inadequate oral intake related to acute injury/trauma as evidenced by NPO or clear liquid status due to medical condition    Nutrition Interventions:   Food and/or Nutrient Delivery:  Continue Current Diet,Continue Oral Nutrition Supplement  Coordination of Nutrition Care:  Continue to monitor while inpatient    Goals:  New goal: Pt will advance to an oral diet or meet nutritional needs without s/sx of intolerance       Nutrition Monitoring and Evaluation:   Food/Nutrient Intake Outcomes:  Diet Advancement/Tolerance,Food and Nutrient Intake  Physical Signs/Symptoms Outcomes:  GI Status,Nausea or Vomiting,Fluid Status or Edema,Nutrition Focused Physical Findings,Biochemical Data,Weight,Skin     Electronically signed by Milton Jaimes MS, RD, LD on 3/11/22 at 9:58 AM CST    Contact: [phone]

## 2022-03-12 VITALS
HEART RATE: 95 BPM | WEIGHT: 210.9 LBS | RESPIRATION RATE: 18 BRPM | TEMPERATURE: 98.4 F | DIASTOLIC BLOOD PRESSURE: 80 MMHG | SYSTOLIC BLOOD PRESSURE: 150 MMHG | OXYGEN SATURATION: 96 % | HEIGHT: 73 IN | BODY MASS INDEX: 27.95 KG/M2

## 2022-03-12 PROCEDURE — 6360000002 HC RX W HCPCS: Performed by: INTERNAL MEDICINE

## 2022-03-12 PROCEDURE — 99233 SBSQ HOSP IP/OBS HIGH 50: CPT | Performed by: SURGERY

## 2022-03-12 PROCEDURE — 6370000000 HC RX 637 (ALT 250 FOR IP): Performed by: HOSPITALIST

## 2022-03-12 PROCEDURE — 6370000000 HC RX 637 (ALT 250 FOR IP): Performed by: INTERNAL MEDICINE

## 2022-03-12 PROCEDURE — 6360000002 HC RX W HCPCS: Performed by: SURGERY

## 2022-03-12 PROCEDURE — 2500000003 HC RX 250 WO HCPCS: Performed by: STUDENT IN AN ORGANIZED HEALTH CARE EDUCATION/TRAINING PROGRAM

## 2022-03-12 RX ORDER — AMOXICILLIN AND CLAVULANATE POTASSIUM 500; 125 MG/1; MG/1
1 TABLET, FILM COATED ORAL 3 TIMES DAILY
Qty: 15 TABLET | Refills: 0 | Status: SHIPPED | OUTPATIENT
Start: 2022-03-12 | End: 2022-03-17

## 2022-03-12 RX ORDER — OXYCODONE AND ACETAMINOPHEN 10; 325 MG/1; MG/1
1 TABLET ORAL EVERY 4 HOURS PRN
Qty: 18 TABLET | Refills: 0 | Status: SHIPPED | OUTPATIENT
Start: 2022-03-12 | End: 2022-03-15

## 2022-03-12 RX ORDER — CLONIDINE HYDROCHLORIDE 0.1 MG/1
0.1 TABLET ORAL 3 TIMES DAILY
Qty: 90 TABLET | Refills: 0 | Status: ON HOLD | OUTPATIENT
Start: 2022-03-12 | End: 2022-03-22

## 2022-03-12 RX ADMIN — PANCRELIPASE 12000 UNITS: 60000; 12000; 38000 CAPSULE, DELAYED RELEASE PELLETS ORAL at 07:40

## 2022-03-12 RX ADMIN — MEROPENEM AND SODIUM CHLORIDE 1000 MG: 1 INJECTION, SOLUTION INTRAVENOUS at 07:42

## 2022-03-12 RX ADMIN — POTASSIUM CHLORIDE 20 MEQ: 1500 TABLET, EXTENDED RELEASE ORAL at 07:40

## 2022-03-12 RX ADMIN — ENOXAPARIN SODIUM 40 MG: 100 INJECTION SUBCUTANEOUS at 07:40

## 2022-03-12 RX ADMIN — CLONIDINE HYDROCHLORIDE 0.1 MG: 0.1 TABLET ORAL at 07:40

## 2022-03-12 RX ADMIN — TAMSULOSIN HYDROCHLORIDE 0.4 MG: 0.4 CAPSULE ORAL at 07:40

## 2022-03-12 RX ADMIN — FAMOTIDINE 20 MG: 10 INJECTION, SOLUTION INTRAVENOUS at 07:40

## 2022-03-12 NOTE — PROGRESS NOTES
[facility] General Surgery Progress Note    Chief Complaint:   Chief Complaint   Patient presents with    Abdominal Pain     all over but worse in LRQ       SUBJECTIVE:  Mr. COLES [facility] is a 73 y.o. male is seen and examined at bedside. Tolerating diet. Denies pain. OBJECTIVE:  BP (!) 150/80   Pulse 95   Temp 98.4 °F (36.9 °C) (Temporal)   Resp 18   Ht 6' 1\" (1.854 m)   Wt 210 lb 14.4 oz (95.7 kg)   SpO2 96%   BMI 27.82 kg/m²   CONSTITUTIONAL: Alert, appropriate, no acute distress  SKIN: warm, dry with no rashes or lesions  HEENT: NCAT, Non icteric, PERR. Trachea Midline. NG in place  CHEST/LUNGS: CTA bilaterally. Normal respiratory effort. CARDIOVASCULAR: RRR, No edema. ABDOMEN: No TTP noted  NEUROLOGIC: CN II-XI grossly intact, no motor or sensory deficits   MUSCULOSKELETAL: No clubbing or cyanosis. PSYCHIATRIC:  Normal Mood and Affect. Alert and Oriented. Labs:  CBC:   Recent Labs     03/10/22  0257 03/11/22  0339   WBC 17.0* 12.4*   HGB 11.1* 10.2*    342     BMP:    Recent Labs     03/10/22  0257 03/11/22  0339   * 140   K 3.1* 3.6    108   CO2 22 22   BUN 10 8   CREATININE 0.7 0.7   GLUCOSE 138* 137*       ASSESSMENT:  Principal Problem:    Pancreatitis, gallstone  Active Problems:    Acute pancreatitis    Cholelithiasis    Dehydration    Abdominal pain  Resolved Problems:    * No resolved hospital problems.  *      PLAN:    Recommend DC home  Recommend follow-up in the GI clinic in 2 weeks  Follow-up with me in the office in 2 weeks  Recommend bland diet  Will need repeat CT to reassess pancreatic necrosis in 2 to 4 weeks      Sherryle South, DO   Electronically Signed on 3/12/2022 at 9:56 AM

## 2022-03-12 NOTE — DISCHARGE SUMMARY
Physician Discharge Summary     Patient ID:  Kaci Pina  [id number]  50 y.o.  1950    Admit date: 2/28/2022    Discharge date and time: 3/12/2022     Discharge Physician: Uri More MD     Discharge Diagnoses:     · Pancreatitis, gallstone- resolved   · Cholelithiasis  · Leukocytosis - cont ab  · Hypokalemia- resolved  · Partial SBO- treated with NGT- resolved  · Necrotizing pancreatitis- follow up with [facility] per their orders   · HTN- clonidine  · History of renal carcinoma sp resection      Discharged Condition: good    Indication for Admission: pancreatitis     Hospital Course:     72 yo male with metastatic renal cell carcinoma followed at [facility], presented with severe epigastric and right upper quadrant pain progressively worsening over few days with radiation to the back, with lipase over 3000 and CT abdomen findings consistent with acute pancreatitis, also noted to have cholelithiasis with pericholecystic inflammation present concerning for cholecystitis. Findings concerning for gallstone pancreatitis. Patient maintained on IV fluids with pain control.  Lipase within normal limits now. General surgery consulted to evaluate for potential cholecystectomy.  Plan for cholecystectomy on outpatient basis. Repeat CT abdomen shows persistent enlargement/edema of the head of the pancreas. Absent enhancement of the body of the pancreas suggesting necrosis. General surgery recommended transfer for higher level care. [facility] call will NOT accept the patient since he is not established patient at [facility]. [facility] GI specialist stated at this point the necrosis is not infected and does not need drainage therefore he refused to accept the patient as transfer and he recommended discharge home with follow up with him as OP. Patient evaluated by GI as well. Cleared for dc home today by all with OP follow up in 2 weeks with general surgery, GI, and at [facility].  Follow up with primary care in 3 days for repeat CMP and CBC. Consults: GI and general surgery    Significant Diagnostic Studies:     CT ABDOMEN PELVIS W IV CONTRAST Additional Contrast? Radiologist Recommendation [[id number]] Resulted: 03/09/22 1041      Order Status: Completed Updated: 03/09/22 1043     Narrative:       Examination. CT ABDOMEN PELVIS W IV CONTRAST 3/9/2022 9:32 AM   History: Reassess pancreatic necrosis. DLP: 1016 mGycm. The automated exposure control was utilized to   minimize the patient's radiation exposure. The CT scan of the abdomen and pelvis is performed after intravenous   contrast enhancement. The images are acquired in axial plane with   subsequent reconstruction in coronal and sagittal planes. The comparison is made with the previous study dated 3/3/2022. There is persistent edema of the pancreas more severely involving the   head of the pancreas. There is absent enhancement in the body of the pancreas. There is good   enhancement of the head and tail of the pancreas. There is increasing surrounding edema. There is fluid accumulation in the lesser sac displacing the stomach   and the transverse colon. This is more pronounced in the previous   study. There is fluid accumulation between the inferior vena cava and the   head of the pancreas. There is fluid accumulation in the left pararenal space, more   pronounced in the previous study. Right paraspinal space is   unremarkable. There is fluid accumulation in the paracolic gutter, right more than   the left. The lung bases included in the study show atelectatic changes at the   lung bases bilaterally with areas of consolidation in bronchogram,   left more than the right. There is bibasal pleural effusion which has   minimally decreased since the previous study. Nasogastric tube is seen with distal end in the proximal stomach. The   position of the distal side-port is above the level of   gastroesophageal junction. This needs to be repositioned. There is fatty infiltration of the liver. The spleen is normal.   A moderately distended gallbladder seen. A single radiopaque calculus   is noted. The adrenal glands are unremarkable. There is normal and symmetrical nephrogram bilaterally. Low-density   nodules in both kidneys are similar to the previous study. No calculi. No hydronephrosis. There is moderate fullness of the renal pelvis   bilaterally which are more pronounced in the previous study. The   ureters bilaterally appear normal. The urinary bladder is incompletely   distended. No intrinsic abnormality. The prostate is enlarged with intrinsic calcification. There are small fat-containing inguinal hernias. The stomach is displaced. The moderate thickening of the wall and   mucosa of the duodenum there is surrounding retroperitoneal fat   infiltration. This is moderately more progressive since the previous   study. Moderate dilatation of the proximal small bowel persist. The   distal small bowel is unremarkable. Moderate gas and minimal stool are   seen in the colon. The distal colon is decompressed. Atheromatous changes of the abdominal aorta and iliac arteries. Significant plaque at the origin of the right renal artery with 50%   stenosis. There is small amount of free fluid in the pelvis. There is   nonspecific mesenteric adenopathy. A lymph node in the left mid   abdomen, image #48 in series 2, measures 5 cm. The images reviewed in bone window show no acute bony abnormality or a   bone lesion. There is diffuse subcutaneous fat infiltration of the abdominal wall   more pronounced in the lower abdomen and pelvis representing   edema/fluid overload.     Impression:       1. Persistent enlargement/edema of the head of the pancreas. Absent   enhancement of the body of the pancreas suggesting necrosis.    2. Increasing peripancreatic retroperitoneal fat infiltration and   increasing fluid accumulation in the lesser sac with progressive displacement of the stomach and transverse colon. 3. Small amount of abdominal and pelvic ascites. 4. Thickening of the wall and mucosa of the duodenum. The changes in   and around the duodenum are normal for vessels in the previous study. 5. Dilatation of the proximal small bowel represent ileus as reaction   to the inflammatory process. 6. Cholelithiasis without evidence of cholecystitis. 7. Nonspecific mesenteric lymphadenopathy. 8. Moderate decrease in the bibasilar pleural effusion. Persistent   atelectasis in lower lobar consolidation. 9. Bilateral small low density nodules are similar to the previous   study probably representing cysts. 10. The distal end of the nasogastric tube is in the proximal stomach   and the distal side port is above the level of gastroesophageal   junction. At least 5 cm advancement is suggested. Signed by Dr Roxana De La Torre [[id number]] Resulted: 03/03/22 1736     Order Status: Completed Updated: 03/03/22 1738     Narrative:       XR CHEST PORTABLE 3/3/2022 5:08 PM   HISTORY: NG tube placement   Comparison: Chest exam dated 11/3/2019      Impression:       Findings and impression:   Frontal radiograph of the lower chest and upper abdomen demonstrates   the tip of the enteric tube within the gastric fundus. Gaseous   distention of multiple small and large bowel loops in the upper   abdomen. No gross intraperitoneal free air. Shallow inspiration with   bilateral low lung volumes. Signed by Dr Donn Johns Additional Contrast? Radiologist Recommendation [[id number]] Resulted: 03/03/22 1540     Order Status: Completed Updated: 03/03/22 1542     Narrative:       EXAMINATION:  CT ABDOMEN PELVIS WO CONTRAST  3/3/2022 4:28 PM   HISTORY: Evaluate for small bowel obstruction. The patient had   pancreatitis 2 days ago on CT.    TECHNIQUE: Spiral CT was performed of the abdomen and pelvis without   contrast. Multiplanar images were reconstructed. DLP: 979 mGy-cm. Automated exposure control was utilized. COMPARISON: 2/28/2022. LUNG BASES: There are new small bilateral pleural effusions. There is   bilateral lower lobe opacification with air bronchograms likely due to   atelectasis. There is cardiomegaly. There is coronary artery   calcification. LIVER AND SPLEEN: A gallstone is again noted in the gallbladder. The   unenhanced liver and spleen are unremarkable. PANCREAS: There is diffuse edema surrounding the pancreas as noted on   the prior study. Contrast was not given today to assess pancreatic   enhancement. There is fullness in the pancreatic head due to the   edema. Fluid in the anterior pararenal space may be decreased   slightly. KIDNEYS AND ADRENALS: There are bilateral renal cysts that are not as   well seen on today's study without contrast. The right adrenal gland   is normal. There has been prior left adrenalectomy. There is also a   suture line along the lateral side of the left kidney. The ureters are   nondilated. There is minimal thickening of the bladder wall. The   prostate is prominent in size. The prostate measures 4.8 cm   transversely. BOWEL: Gastric fundus is filled with contrast. The distal antrum of   the stomach is decompressed. There is some inflammatory change   involving the gastric fundus and proximal duodenum, likely secondarily   inflamed due to the pancreatitis. Small bowel loops are dilated. There   are multiple fluid levels within small bowel. Small bowel measures up   to 3.3 cm. The before meals ascending and transverse colon are also   mildly dilated. The transverse colon is filled with air. The   descending colon is decompressed. OTHER: There is a small amount of free fluid in the pelvis. There is   atheromatous disease of the aortoiliac vessels. Small lymph nodes in   the mesentery and retroperitoneum are likely reactive. There are small   fat-containing inguinal hernias.  There is atheromatous disease of the   aortoiliac vessels. There are degenerative changes of the spine.     Impression:       1. Continued findings of pancreatitis. Pancreatic enhancement cannot   be evaluated as today's study was performed without contrast. There is   continued prominence of the pancreatic head due to the edema. Fluid in   the anterior pararenal spaces may be slightly decreased. The distal   stomach and proximal duodenum are likely secondarily inflamed due to   the pancreatitis. 2. Cholelithiasis. 3. Dilated small bowel and large bowel. Fluid-filled small bowel with   air-fluid levels. These findings are likely related to ileus related   to pancreatitis. Partial small bowel obstruction cannot be entirely   ruled out. The patient would probably benefit from a NG tube with   suction to help decrease bowel distention. 4. New bilateral pleural effusions. Bilateral lower lobe opacities   likely due to atelectasis. Pneumonia less likely. There is   cardiomegaly. There is mild body wall edema. 5. Bilateral renal cysts are not as well seen on today's study without   contrast. Prior left adrenalectomy. Prior wedge resection left kidney. 6. Other nonacute findings, as discussed above. Signed by Dr Cinthia Tristan     XR ABDOMEN (KUB) (SINGLE AP VIEW) [[id number]] Resulted: 03/03/22 0958     Order Status: Completed Updated: 03/03/22 1000     Narrative:       EXAMINATION:  XR ABDOMEN (KUB) (SINGLE AP VIEW)  3/3/2022 10:55 AM   HISTORY: Distended and hard abdomen. COMPARISON: No comparison study. TECHNIQUE: Supine abdomen. FINDINGS: Rubi Vizcarra is air within small bowel and colon. Small bowel is   dilated measuring up to 4 cm. There is no air in the rectum. There is   no organomegaly or mass effect. There are degenerative changes of the   spine. There is enthesopathy of the pelvis.     Impression:       Nonspecific gas pattern with dilated small bowel and air   throughout most of the colon.  There is no air in the rectum. Signed by Dr Jennifer Hyman [[id number]] Resulted: 03/01/22 0816     Order Status: Completed Updated: 03/01/22 0818     Narrative:       Examination. US GALLBLADDER RUQ 3/1/2022 7:59 AM   History: Right upper quadrant pain. Ultrasound examination of the abdomen is performed. There is no   previous similar study for comparison. The correlation made with CT   scan of the abdomen dated 2/22/2022. A normal size gallbladder seen. There is a calculus, 15 mm in   diameter, in the neck of the gallbladder. The gallbladder wall   measures 2 mm in thickness. The common bile duct measures 5 mm in   diameter. There is fatty infiltration of the liver. No focal intrinsic   abnormality. The pancreas is obscured by the bowel contents and not evaluated. A normal right kidney is seen. No mass or hydronephrosis.     Impression:       1. Cholelithiasis. 2. The pancreas is not optimally evaluated due to superimposed bowel   gas. Signed by Dr Shira Saucedo [[id number]]      Order Status: Canceled      CT ABDOMEN PELVIS W IV CONTRAST Additional Contrast? None [[id number]] Resulted: 02/28/22 2127     Order Status: Completed Updated: 02/28/22 2129     Narrative:       CT ABDOMEN PELVIS W IV CONTRAST 2/28/2022 8:32 PM   HISTORY: Mid and upper abdominal pain. HISTORY of renal cell   carcinoma. COMPARISON: None. DLP: 1633 mGy cm. All CT scans are performed using dose optimization   techniques as appropriate to the performed exam and include at least   one of the following: Automated exposure control, adjustment of the mA   and/or kV according to size, and the use of the iterative   reconstruction technique. TECHNIQUE: Following the intravenous administration of contrast,   helical CT tomographic images of the abdomen and pelvis were acquired. Coronal reformatted images were also provided for review. FINDINGS:   Bibasilar atelectasis is present.  A small hiatal hernia is present. The base of the heart is unremarkable. [facility] LIVER: No focal liver lesion. The hepatic vasculature is patent. There   is diffuse steatosis of the liver. BILIARY SYSTEM: Cholelithiasis is present. Pericholecystic   inflammation is present. There is some stranding in the marvel   hepatis. [facility] PANCREAS: There is enlargement of the pancreatic head with edema   surrounding the pancreatic head as well as the body and tail of the   pancreas with inflammatory stranding in the anterior pararenal space. Radiographic findings would suggest acute pancreatitis. Inflammation   is noted associated with the anterior renal fascia bilaterally with   phlegmonous inflammatory change extending into the lower right   paracolic gutter. There is inflammation at the level of the transverse   duodenum and more inferiorly within the anterior pararenal space. No   localized fluid collections identified to suggest abscess or   developing pseudocyst..   SPLEEN: Unremarkable. KIDNEYS AND ADRENALS: The adrenals are unremarkable. There is a   cortical cyst involving the midpole the left kidney as well as a cyst   of the right kidney. No evidence of nephrolithiasis. . The ureters are   decompressed and normal in appearance. RETROPERITONEUM: There is atheromatous calcification of the abdominal   aorta and iliac arteries. No evidence of aneurysm. No evidence of   retroperitoneal hematoma or adenopathy. [facility] GI TRACT: There is some stranding within the transverse mesocolon   related to the pancreatitis. There is mild constipation with increased   stool throughout the colon. The appendix is identified and is normal   in appearance. . No evidence of pneumoperitoneum. .   OTHER: There is mild stranding within the small bowel mesentery and at   the root of the mesentery related to the patient's pancreatitis. . The   abdominopelvic vasculature is patent. The osseous structures and soft   tissues demonstrate no worrisome lesions. There is free fluid within   the pelvis. PELVIS: The prostate gland is enlarged. Mild prominence of the urinary   bladder wall. . No evidence of intraluminal air or stone within the   bladder. .     Impression:       1. Rather extensive pancreatitis with extensive phlegmonous   inflammatory changes surrounding the pancreatic gland. There is also   inflammation within the marvel hepatis and associated with the   gallbladder. A gallstone is identified within the gallbladder lumen. There is free fluid within the pelvis. Inflammatory change extends   inferiorly from the pancreatic gland within the anterior pararenal   space along the anterior renal fascia and right paracolic gutter. 2. No evidence of pancreatic necrosis. There is mild constipation with   increased stool within the right and transverse colon. There is no   obstruction or free air. The appendix is normal in appearance. 3. Bibasilar atelectasis. 4. Mild steatosis of the liver. 5. Cortical cysts of the kidneys. No evidence of nephrolithiasis or   obstructive uropathy. 6. Prostatic enlargement. Mild prominence of the urinary bladder   wall. .   Signed by Dr Modesta Chris             Discharge Exam:  BP (!) 150/80   Pulse 95   Temp 98.4 °F (36.9 °C) (Temporal)   Resp 18   Ht 6' 1\" (1.854 m)   Wt 210 lb 14.4 oz (95.7 kg)   SpO2 96%   BMI 27.82 kg/m²     General Appearance:    Alert, cooperative, no distress, appears stated age   Head:    Normocephalic, without obvious abnormality, atraumatic           Nose:   Nares normal, septum midline, mucosa normal, no drainage    or sinus tenderness       Neck:   Supple, symmetrical, trachea midline, no adenopathy;        thyroid:  No enlargement/tenderness/nodules; no carotid    bruit or JVD       Lungs:     Clear to auscultation bilaterally, respirations unlabored       Heart:    Regular rate and rhythm, S1 and S2 normal, no murmur, rub   or gallop   Abdomen:     Soft, non-tender, bowel sounds active all four quadrants,     no masses, no organomegaly           Extremities:   Extremities normal, atraumatic, no cyanosis or edema       Skin:   Skin color, texture, turgor normal, no rashes or lesions       Neurologic:   Normal strength, sensation and reflexes       throughout       Discharge Medications:         Medication List      START taking these medications    amoxicillin-clavulanate 500-125 MG per tablet  Commonly known as: Augmentin  Take 1 tablet by mouth 3 times daily for 5 days     cloNIDine 0.1 MG tablet  Commonly known as: CATAPRES  Take 1 tablet by mouth 3 times daily     lipase-protease-amylase 38540-90656 units delayed release capsule  Commonly known as: CREON  Take 1 capsule by mouth 3 times daily (with meals)     oxyCODONE-acetaminophen  MG per tablet  Commonly known as: PERCOCET  Take 1 tablet by mouth every 4 hours as needed for Pain for up to 3 days. CONTINUE taking these medications    cetirizine 10 MG tablet  Commonly known as: ZYRTEC     famotidine 20 MG tablet  Commonly known as: Pepcid  Take 1 tablet by mouth daily     niacin 100 MG tablet     tamsulosin 0.4 MG capsule  Commonly known as: FLOMAX     therapeutic multivitamin-minerals tablet     vitamin C 500 MG tablet  Commonly known as: ASCORBIC ACID     vitamin E 1000 units capsule           Where to Get Your Medications      These medications were sent to [id number], [address] [facility]    Phone: [phone]   · amoxicillin-clavulanate 500-125 MG per tablet  · cloNIDine 0.1 MG tablet  · lipase-protease-amylase 01206-43955 units delayed release capsule  · oxyCODONE-acetaminophen  MG per tablet           Discharge Instructions: Follow up with Lorie Carreon MD in 3 days. With Dr Claudeen Chant in 2 weeks, with GI group in 2 weeks, with [facility] specialist in 2 weeks Take medications as directed.   Resume activity as tolerated. Follow up with your oncologist as scheduled (or in 2 weeks)    Diet: ADULT ORAL NUTRITION SUPPLEMENT; Breakfast, Lunch, Dinner; Clear Liquid Oral Supplement  ADULT DIET;  Dysphagia - Minced and Moist; Low Sodium (2 gm)     Disposition: Patient is medically stable and will be discharged home    DC time 35 min

## 2022-03-14 ENCOUNTER — TELEPHONE (OUTPATIENT)
Dept: GASTROENTEROLOGY | Age: 72
End: 2022-03-14

## 2022-03-14 NOTE — TELEPHONE ENCOUNTER
Wife called to schedule a hospital followup. Please be advised that the best time to call him to accommodate their needs is Anytime. Thank you.

## 2022-03-15 ENCOUNTER — TELEPHONE (OUTPATIENT)
Dept: INTERNAL MEDICINE | Age: 72
End: 2022-03-15

## 2022-03-15 RX ORDER — SULFAMETHOXAZOLE AND TRIMETHOPRIM 800; 160 MG/1; MG/1
1 TABLET ORAL 2 TIMES DAILY
Qty: 14 TABLET | Refills: 0 | Status: ON HOLD | OUTPATIENT
Start: 2022-03-15 | End: 2022-03-22

## 2022-03-15 NOTE — TELEPHONE ENCOUNTER
Sounds like he has urinary tract infection. Bactrim DS twice a day for 7 days faxed to his Amy in [place].

## 2022-03-15 NOTE — TELEPHONE ENCOUNTER
[facility] Transitions Initial Follow Up Call    Outreach made within 2 business days of discharge: Yes    Patient: Tor Hughes   Patient : 1950  MRN: [id number]    Reason for Admission: Admitted Admitted 22 for abdominal pain   Discharge Date: 3/12/22    Discharge Diagnoses:   · Pancreatitis, gallstone- resolved   · Cholelithiasis  · Leukocytosis - cont ab  · Hypokalemia- resolved  · Partial SBO- treated with NGT- resolved  · Necrotizing pancreatitis- follow up with [facility] per their orders   · HTN- clonidine  · History of renal carcinoma sp resection     Spoke with: patient's wife     Discharge department/facility: [facility]     TCM Interactive Patient Contact:  Was patient able to fill all prescriptions: Yes  Was patient instructed to bring all medications to the follow-up visit: Yes  Is patient taking all medications as directed in the discharge summary? Yes  Does patient understand their discharge instructions: Yes  Does patient have questions or concerns that need addressed prior to 7-14 day follow up office visit: yes    I spoke with patient's wife. She states he is home and feeling better. She states he has not had any more episodes of abdominal pain. He is eating a soft diet and tolerating that well without nausea or vomiting. She states he had a bowel movement this morning and his stool seems to be firming up some. She states he did complain yesterday of dysuria and reports he was up most of the night having to run to the bathroom to urinate. He also reports hesitancy. I have advised her I will send a message to Dr. Uzair Whyte regarding this issue and we will get back with them today. He is taking all of his medications and prescribed and request his appointment for 3/18 be changed from virtual to in office. I have changed his appointment and sent a separate encounter to Dr. Uzair Whyte.      Scheduled appointment with PCP within 7-14 days    Follow Up  Future Appointments   Date Time Provider Adriana Snehal   3/18/2022  1:30 PM Maricruz Evans MD [facility]   3/28/2022  9:45 AM DO JEFFREY Galvan LPS [facility]   3/31/2022  8:10 AM RAY Shell LPS [facility]   5/12/2022  8:00 AM Maricruz Evans MD LPS [facility] [place]

## 2022-03-15 NOTE — TELEPHONE ENCOUNTER
I spoke with . [facility] and his wife for TCM follow up. He complains since yesterday he has had dysuria, hesitancy of urination, stating he has the urge to go every hour, but then has hesitancy to go and a weak stream with burning. Please advise. ..

## 2022-03-18 ENCOUNTER — HOSPITAL ENCOUNTER (EMERGENCY)
Age: 72
Discharge: HOME | End: 2022-03-18
Attending: EMERGENCY MEDICINE
Payer: MEDICARE

## 2022-03-18 ENCOUNTER — TELEPHONE (OUTPATIENT)
Dept: UROLOGY | Age: 72
End: 2022-03-18

## 2022-03-18 VITALS
RESPIRATION RATE: 18 BRPM | SYSTOLIC BLOOD PRESSURE: 132 MMHG | DIASTOLIC BLOOD PRESSURE: 69 MMHG | TEMPERATURE: 98.1 F | OXYGEN SATURATION: 96 % | HEART RATE: 100 BPM | WEIGHT: 210 LBS | BODY MASS INDEX: 27.83 KG/M2 | HEIGHT: 73 IN

## 2022-03-18 DIAGNOSIS — R33.9 URINARY RETENTION: Primary | ICD-10-CM

## 2022-03-18 LAB
ALBUMIN SERPL-MCNC: 3.4 G/DL (ref 3.5–5.2)
ALP BLD-CCNC: 88 U/L (ref 40–130)
ALT SERPL-CCNC: 49 U/L (ref 5–41)
ANION GAP SERPL CALCULATED.3IONS-SCNC: 14 MMOL/L (ref 7–19)
AST SERPL-CCNC: 29 U/L (ref 5–40)
BASOPHILS ABSOLUTE: 0.1 K/UL (ref 0–0.2)
BASOPHILS RELATIVE PERCENT: 0.5 % (ref 0–1)
BILIRUB SERPL-MCNC: 0.6 MG/DL (ref 0.2–1.2)
BILIRUBIN URINE: NEGATIVE
BLOOD, URINE: NEGATIVE
BUN BLDV-MCNC: 13 MG/DL (ref 8–23)
CALCIUM SERPL-MCNC: 8.6 MG/DL (ref 8.8–10.2)
CHLORIDE BLD-SCNC: 100 MMOL/L (ref 98–111)
CLARITY: CLEAR
CO2: 21 MMOL/L (ref 22–29)
COLOR: YELLOW
CREAT SERPL-MCNC: 0.8 MG/DL (ref 0.5–1.2)
EOSINOPHILS ABSOLUTE: 0.1 K/UL (ref 0–0.6)
EOSINOPHILS RELATIVE PERCENT: 0.8 % (ref 0–5)
GFR AFRICAN AMERICAN: >59
GFR NON-AFRICAN AMERICAN: >60
GLUCOSE BLD-MCNC: 146 MG/DL (ref 74–109)
GLUCOSE URINE: NEGATIVE MG/DL
HCT VFR BLD CALC: 39.6 % (ref 42–52)
HEMOGLOBIN: 12.6 G/DL (ref 14–18)
IMMATURE GRANULOCYTES #: 0.1 K/UL
KETONES, URINE: NEGATIVE MG/DL
LEUKOCYTE ESTERASE, URINE: NEGATIVE
LIPASE: 163 U/L (ref 13–60)
LYMPHOCYTES ABSOLUTE: 2.6 K/UL (ref 1.1–4.5)
LYMPHOCYTES RELATIVE PERCENT: 20.1 % (ref 20–40)
MCH RBC QN AUTO: 29.7 PG (ref 27–31)
MCHC RBC AUTO-ENTMCNC: 31.8 G/DL (ref 33–37)
MCV RBC AUTO: 93.4 FL (ref 80–94)
MONOCYTES ABSOLUTE: 1 K/UL (ref 0–0.9)
MONOCYTES RELATIVE PERCENT: 7.3 % (ref 0–10)
NEUTROPHILS ABSOLUTE: 9.2 K/UL (ref 1.5–7.5)
NEUTROPHILS RELATIVE PERCENT: 70.9 % (ref 50–65)
NITRITE, URINE: NEGATIVE
PDW BLD-RTO: 14.1 % (ref 11.5–14.5)
PH UA: 7 (ref 5–8)
PLATELET # BLD: 551 K/UL (ref 130–400)
PMV BLD AUTO: 9.5 FL (ref 9.4–12.4)
POTASSIUM SERPL-SCNC: 4.1 MMOL/L (ref 3.5–5)
PROTEIN UA: NEGATIVE MG/DL
RBC # BLD: 4.24 M/UL (ref 4.7–6.1)
REASON FOR REJECTION: NORMAL
REJECTED TEST: NORMAL
SODIUM BLD-SCNC: 135 MMOL/L (ref 136–145)
SPECIFIC GRAVITY UA: 1.01 (ref 1–1.03)
TOTAL PROTEIN: 5.8 G/DL (ref 6.6–8.7)
UROBILINOGEN, URINE: 0.2 E.U./DL
WBC # BLD: 13 K/UL (ref 4.8–10.8)

## 2022-03-18 PROCEDURE — 51702 INSERT TEMP BLADDER CATH: CPT

## 2022-03-18 PROCEDURE — 36415 COLL VENOUS BLD VENIPUNCTURE: CPT

## 2022-03-18 PROCEDURE — 83690 ASSAY OF LIPASE: CPT

## 2022-03-18 PROCEDURE — 80053 COMPREHEN METABOLIC PANEL: CPT

## 2022-03-18 PROCEDURE — 81003 URINALYSIS AUTO W/O SCOPE: CPT

## 2022-03-18 PROCEDURE — 99282 EMERGENCY DEPT VISIT SF MDM: CPT

## 2022-03-18 PROCEDURE — 85025 COMPLETE CBC W/AUTO DIFF WBC: CPT

## 2022-03-18 ASSESSMENT — ENCOUNTER SYMPTOMS
COUGH: 0
VOMITING: 0
BACK PAIN: 0
NAUSEA: 0
SHORTNESS OF BREATH: 0

## 2022-03-18 NOTE — TELEPHONE ENCOUNTER
Patient's wife Moisés Palacio called from [facility] ED regarding a question about Namita Evans. Namita Evans has a [facility] ED discharge summary note to follow up by Monday or Tuesday next week with [facility] for for ward removal and re-eval, if can't get in return to er for removal.       Please return call at [phone]  at your earliest convenience if able to accommodate this New Patient (pt current uro doctor in [place] has recently retired).    Thank you other

## 2022-03-18 NOTE — ED PROVIDER NOTES
[address] EMERG[facility] DE[facility]  eMERGENCY dEPARTMENT eNCOUnter      Pt Name: Yvonne Villareal  MRN: [id number]  [place] 1950  Date of evaluation: 3/18/2022  Provider: Varghese Holt MD    CHIEF COMPLAINT       Chief Complaint   Patient presents with    Urinary Retention     x 3 days         HISTORY OF PRESENT ILLNESS   (Location/Symptom, Timing/Onset,Context/Setting, Quality, Duration, Modifying Factors, Severity)  Note limiting factors. Yvonne Villareal is a 73 y.o. male who presents to the emergency department with dribbling minimal urine x 3 days, maybe 10 drops. Pt complicated with hx see dc summary 3/12   72 yo male with metastatic renal cell carcinoma followed at [facility], presented with severe epigastric and right upper quadrant pain progressively worsening over few days with radiation to the back, with lipase over 3000 and CT abdomen findings consistent with acute pancreatitis, also noted to have cholelithiasis with pericholecystic inflammation present concerning for cholecystitis. Findings concerning for gallstone pancreatitis. Patient maintained on IV fluids with pain control.  Lipase within normal limits now. General surgery consulted to evaluate for potential cholecystectomy.  Plan for cholecystectomy on outpatient basis      Pt trying to urinate today but can't has lower abd pain. Is able to stand and walk. Was unable to be transferred on last admit and was going to be seen outpt. On bactrim and amox. Not taking pain meds. The history is provided by the patient, the spouse and medical records. NursingNotes were reviewed. REVIEW OF SYSTEMS    (2-9 systems for level 4, 10 or more for level 5)     Review of Systems   Constitutional: Negative for fever. Respiratory: Negative for cough and shortness of breath. Cardiovascular: Negative for chest pain. Gastrointestinal: Negative for nausea and vomiting. Genitourinary: Positive for difficulty urinating and dysuria. Musculoskeletal: Negative for back pain. Neurological: Negative for seizures and syncope. Psychiatric/Behavioral: Negative for confusion. A complete review of systems was performed and is negative except as noted above in the HPI.        PAST MEDICAL HISTORY     Past Medical History:   Diagnosis Date    Metastatic renal cell carcinoma ([address]     Nasopharyngeal mass     Metastatic renal cell carcinoma, clear cell         SURGICAL HISTORY       Past Surgical History:   Procedure Laterality Date    ADRENALECTOMY Left 10/2017    NOSE SURGERY  12/2017    Renal cell carcinoma, metastatic clear cell, April 2016, December 2017    PARTIAL NEPHRECTOMY Left 10/2017    t3a n0 m0         CURRENT MEDICATIONS       Previous Medications    CETIRIZINE (ZYRTEC) 10 MG TABLET    Take 10 mg by mouth    CLONIDINE (CATAPRES) 0.1 MG TABLET    Take 1 tablet by mouth 3 times daily    FAMOTIDINE (PEPCID) 20 MG TABLET    Take 1 tablet by mouth daily    LIPASE-PROTEASE-AMYLASE (CREON) 14905-05793 UNITS DELAYED RELEASE CAPSULE    Take 1 capsule by mouth 3 times daily (with meals)    MULTIPLE VITAMINS-MINERALS (THERAPEUTIC MULTIVITAMIN-MINERALS) TABLET    Take 1 tablet by mouth daily    NIACIN 100 MG TABLET    Take 100 mg by mouth daily (with breakfast)    SULFAMETHOXAZOLE-TRIMETHOPRIM (BACTRIM DS) 800-160 MG PER TABLET    Take 1 tablet by mouth 2 times daily for 7 days    TAMSULOSIN (FLOMAX) 0.4 MG CAPSULE    Take 1 capsule by mouth every other day     VITAMIN C (ASCORBIC ACID) 500 MG TABLET    Take 500 mg by mouth daily    VITAMIN E 1000 UNITS CAPSULE    Take 1,000 Units by mouth daily       ALLERGIES     Nexium [esomeprazole magnesium] and Prilosec [omeprazole]    FAMILY HISTORY       Family History   Problem Relation Age of Onset    Breast Cancer Mother     Breast Cancer Sister     Cancer Brother           SOCIAL HISTORY       Social History     Socioeconomic History    Marital status:      Spouse name: None    Number of children: None    Years of education: None    Highest education level: None   Occupational History    None   Tobacco Use    Smoking status: Never Smoker    Smokeless tobacco: Never Used   Vaping Use    Vaping Use: Never used   Substance and Sexual Activity    Alcohol use: No    Drug use: No    Sexual activity: None   Other Topics Concern    None   Social History Narrative    None     Social Determinants of Health     Financial Resource Strain:     Difficulty of Paying Living Expenses: Not on file   Food Insecurity:     Worried About Running Out of Food in the Last Year: Not on file    Fabio of Food in the Last Year: Not on file   Transportation Needs:     Lack of Transportation (Medical): Not on file    Lack of Transportation (Non-Medical):  Not on file   Physical Activity:     Days of Exercise per Week: Not on file    Minutes of Exercise per Session: Not on file   Stress:     Feeling of Stress : Not on file   Social Connections:     Frequency of Communication with Friends and Family: Not on file    Frequency of Social Gatherings with Friends and Family: Not on file    Attends Mandaeism Services: Not on file    Active Member of [address] [place] [address] or Organizations: Not on file    Attends Club or Organization Meetings: Not on file    Marital Status: Not on file   Intimate Partner Violence:     Fear of Current or Ex-Partner: Not on file    Emotionally Abused: Not on file    Physically Abused: Not on file    Sexually Abused: Not on file   Housing Stability:     Unable to Pay for Housing in the Last Year: Not on file    Number of [place] in the Last Year: Not on file    Unstable Housing in the Last Year: Not on file       SCREENINGS             PHYSICAL EXAM    (up to 7 for level 4, 8 or more for level 5)     ED Triage Vitals [03/18/22 0653]   BP Temp Temp src Pulse Resp SpO2 Height Weight   (!) 140/70 98.1 °F (36.7 °C) -- 114 18 98 % 6' 1\" (1.854 m) 210 lb (95.3 kg)       Physical Exam  Vitals and nursing note reviewed. Constitutional:       General: He is not in acute distress. Appearance: Normal appearance. Comments: Standing at bedside when I enter room trying to urinate without success    HENT:      Head: Normocephalic and atraumatic. Eyes:      Extraocular Movements: Extraocular movements intact. Pupils: Pupils are equal, round, and reactive to light. Cardiovascular:      Rate and Rhythm: Regular rhythm. Tachycardia present. Comments: 104  Pulmonary:      Effort: Pulmonary effort is normal. No respiratory distress. Abdominal:      General: Abdomen is flat. Palpations: Abdomen is soft. Tenderness: There is no guarding or rebound. Comments: Some suprapubic fullness    No abd pain otherwise       Musculoskeletal:         General: No tenderness. Normal range of motion. Cervical back: Normal range of motion and neck supple. Skin:     General: Skin is warm and dry. Capillary Refill: Capillary refill takes less than 2 seconds. Coloration: Skin is not jaundiced. Neurological:      General: No focal deficit present. Mental Status: He is alert and oriented to person, place, and time. Psychiatric:         Mood and Affect: Mood normal.         Behavior: Behavior normal.         Thought Content:  Thought content normal.         Judgment: Judgment normal.         DIAGNOSTIC RESULTS     EKG: All EKG's are interpreted by the Emergency Department Physician who either signs or Co-signs this chart in the absence of a cardiologist.        RADIOLOGY:   Non-plain film images such as CT, Ultrasound and MRI are read by the radiologist. Plainradiographic images are visualized and preliminarily interpreted by the emergency physician with the below findings:        Interpretation per the Radiologist below, if available at the time of this note:    No orders to display         ED BEDSIDE ULTRASOUND:   Performed by ED Physician - none    LABS:  Labs Reviewed   CBC WITH AUTO DIFFERENTIAL - Abnormal; Notable for the following components:       Result Value    WBC 13.0 (*)     RBC 4.24 (*)     Hemoglobin 12.6 (*)     Hematocrit 39.6 (*)     MCHC 31.8 (*)     Platelets 529 (*)     Neutrophils % 70.9 (*)     Neutrophils Absolute 9.2 (*)     Monocytes Absolute 1.00 (*)     All other components within normal limits   COMPREHENSIVE METABOLIC PANEL - Abnormal; Notable for the following components:    Sodium 135 (*)     CO2 21 (*)     Glucose 146 (*)     Calcium 8.6 (*)     Total Protein 5.8 (*)     Albumin 3.4 (*)     ALT 49 (*)     All other components within normal limits   LIPASE - Abnormal; Notable for the following components:    Lipase 163 (*)     All other components within normal limits   URINALYSIS WITH REFLEX TO CULTURE   SPECIMEN REJECTION       All other labs were within normal range or not returned as of this dictation. EMERGENCY DEPARTMENT COURSE and DIFFERENTIALDIAGNOSIS/MDM:   Vitals:    Vitals:    03/18/22 0653   BP: (!) 140/70   Pulse: 114   Resp: 18   Temp: 98.1 °F (36.7 °C)   SpO2: 98%   Weight: 210 lb (95.3 kg)   Height: 6' 1\" (1.854 m)       MDM  Number of Diagnoses or Management Options  Diagnosis management comments: ddx meds, or prostate issues    Pt states no issues with prostate in past    Has no uro now had one in [facility] wants to go here now     9:12 AM  Pt feels better after Ward    Put out about 1 L    Making good urine    abd benign post ward placement. No fever. Pt has no fever vomiting and after ward placement has no abd pain at all. \"feels fine\"     Patient wife and I discussed his labs he is taking some antibiotics his heart rates around 100 he otherwise feels well he is eating and drinking fine. He has no vomiting. He has no abdominal pain whatsoever the Ward will be left in place lasting to follow-up with urology outpatient. Tells me he is not taking pain medicines.        Amount and/or Complexity of Data Reviewed  Clinical lab tests: ordered and reviewed  Decide to obtain previous medical records or to obtain history from someone other than the patient: yes  Obtain history from someone other than the patient: yes    Patient Progress  Patient progress: improved        CONSULTS:  None    PROCEDURES:  Unless otherwise notedbelow, none     Procedures    FINAL IMPRESSION     1.  Urinary retention          DISPOSITION/PLAN   DISPOSITION        PATIENT REFERRED TO:  Andres Davila MD  [address]    Schedule an appointment as soon as possible for a visit in 1 week      George Paez, [address] [place], [place] [id number]  [phone]    Schedule an appointment as soon as possible for a visit in 3 days  for mon or tues for ward removal and reeval, if can't get in return to er for removal      DISCHARGE MEDICATIONS:  New Prescriptions    No medications on file          (Please note that portions of this note were completed with a voice recognition program.  Efforts were made to edit the dictations butoccasionally words are mis-transcribed.)    Lady Edgar MD (electronically signed)  AttendingEmerMer[facility] Physician         Lady Edgar MD  03/18/22 5632

## 2022-03-22 ENCOUNTER — APPOINTMENT (OUTPATIENT)
Dept: CT IMAGING | Age: 72
DRG: 438 | End: 2022-03-22
Payer: MEDICARE

## 2022-03-22 ENCOUNTER — HOSPITAL ENCOUNTER (INPATIENT)
Age: 72
LOS: 4 days | Discharge: HOME | DRG: 438 | End: 2022-03-26
Attending: EMERGENCY MEDICINE | Admitting: INTERNAL MEDICINE
Payer: MEDICARE

## 2022-03-22 DIAGNOSIS — K85.91 ACUTE PANCREATITIS WITH UNINFECTED NECROSIS, UNSPECIFIED PANCREATITIS TYPE: Primary | ICD-10-CM

## 2022-03-22 DIAGNOSIS — J90 PLEURAL EFFUSION: ICD-10-CM

## 2022-03-22 DIAGNOSIS — R33.9 URINARY RETENTION: ICD-10-CM

## 2022-03-22 LAB
ALBUMIN SERPL-MCNC: 3.8 G/DL (ref 3.5–5.2)
ALP BLD-CCNC: 112 U/L (ref 40–130)
ALT SERPL-CCNC: 55 U/L (ref 5–41)
ANION GAP SERPL CALCULATED.3IONS-SCNC: 16 MMOL/L (ref 7–19)
AST SERPL-CCNC: 38 U/L (ref 5–40)
BASOPHILS ABSOLUTE: 0.1 K/UL (ref 0–0.2)
BASOPHILS RELATIVE PERCENT: 0.7 % (ref 0–1)
BILIRUB SERPL-MCNC: 0.6 MG/DL (ref 0.2–1.2)
BUN BLDV-MCNC: 18 MG/DL (ref 8–23)
CALCIUM SERPL-MCNC: 9.9 MG/DL (ref 8.8–10.2)
CHLORIDE BLD-SCNC: 97 MMOL/L (ref 98–111)
CO2: 23 MMOL/L (ref 22–29)
CREAT SERPL-MCNC: 0.9 MG/DL (ref 0.5–1.2)
EKG P AXIS: 62 DEGREES
EKG P-R INTERVAL: 132 MS
EKG Q-T INTERVAL: 316 MS
EKG QRS DURATION: 78 MS
EKG QTC CALCULATION (BAZETT): 394 MS
EKG T AXIS: 62 DEGREES
EOSINOPHILS ABSOLUTE: 0.2 K/UL (ref 0–0.6)
EOSINOPHILS RELATIVE PERCENT: 2.6 % (ref 0–5)
GFR AFRICAN AMERICAN: >59
GFR NON-AFRICAN AMERICAN: >60
GLUCOSE BLD-MCNC: 127 MG/DL (ref 74–109)
HCT VFR BLD CALC: 40.6 % (ref 42–52)
HEMOGLOBIN: 12.9 G/DL (ref 14–18)
IMMATURE GRANULOCYTES #: 0 K/UL
INR BLD: 1.16 (ref 0.88–1.18)
LIPASE: 215 U/L (ref 13–60)
LYMPHOCYTES ABSOLUTE: 2.1 K/UL (ref 1.1–4.5)
LYMPHOCYTES RELATIVE PERCENT: 25.3 % (ref 20–40)
MCH RBC QN AUTO: 29.7 PG (ref 27–31)
MCHC RBC AUTO-ENTMCNC: 31.8 G/DL (ref 33–37)
MCV RBC AUTO: 93.3 FL (ref 80–94)
MONOCYTES ABSOLUTE: 0.7 K/UL (ref 0–0.9)
MONOCYTES RELATIVE PERCENT: 8.8 % (ref 0–10)
NEUTROPHILS ABSOLUTE: 5.3 K/UL (ref 1.5–7.5)
NEUTROPHILS RELATIVE PERCENT: 62.2 % (ref 50–65)
PDW BLD-RTO: 13.9 % (ref 11.5–14.5)
PLATELET # BLD: 316 K/UL (ref 130–400)
PMV BLD AUTO: 10 FL (ref 9.4–12.4)
POTASSIUM SERPL-SCNC: 4.5 MMOL/L (ref 3.5–5)
PROTHROMBIN TIME: 14.9 SEC (ref 12–14.6)
RBC # BLD: 4.35 M/UL (ref 4.7–6.1)
SARS-COV-2, NAAT: NOT DETECTED
SODIUM BLD-SCNC: 136 MMOL/L (ref 136–145)
TOTAL PROTEIN: 6.5 G/DL (ref 6.6–8.7)
WBC # BLD: 8.4 K/UL (ref 4.8–10.8)

## 2022-03-22 PROCEDURE — 36415 COLL VENOUS BLD VENIPUNCTURE: CPT

## 2022-03-22 PROCEDURE — 2580000003 HC RX 258: Performed by: NURSE PRACTITIONER

## 2022-03-22 PROCEDURE — 74177 CT ABD & PELVIS W/CONTRAST: CPT

## 2022-03-22 PROCEDURE — 96361 HYDRATE IV INFUSION ADD-ON: CPT

## 2022-03-22 PROCEDURE — 99284 EMERGENCY DEPT VISIT MOD MDM: CPT

## 2022-03-22 PROCEDURE — 83690 ASSAY OF LIPASE: CPT

## 2022-03-22 PROCEDURE — 6360000002 HC RX W HCPCS: Performed by: NURSE PRACTITIONER

## 2022-03-22 PROCEDURE — 80053 COMPREHEN METABOLIC PANEL: CPT

## 2022-03-22 PROCEDURE — 1210000000 HC MED SURG R&B

## 2022-03-22 PROCEDURE — 6370000000 HC RX 637 (ALT 250 FOR IP): Performed by: NURSE PRACTITIONER

## 2022-03-22 PROCEDURE — 85025 COMPLETE CBC W/AUTO DIFF WBC: CPT

## 2022-03-22 PROCEDURE — 93005 ELECTROCARDIOGRAM TRACING: CPT | Performed by: EMERGENCY MEDICINE

## 2022-03-22 PROCEDURE — 96375 TX/PRO/DX INJ NEW DRUG ADDON: CPT

## 2022-03-22 PROCEDURE — 99221 1ST HOSP IP/OBS SF/LOW 40: CPT | Performed by: INTERNAL MEDICINE

## 2022-03-22 PROCEDURE — 87635 SARS-COV-2 COVID-19 AMP PRB: CPT

## 2022-03-22 PROCEDURE — 2580000003 HC RX 258: Performed by: EMERGENCY MEDICINE

## 2022-03-22 PROCEDURE — 85610 PROTHROMBIN TIME: CPT

## 2022-03-22 PROCEDURE — 6360000004 HC RX CONTRAST MEDICATION: Performed by: EMERGENCY MEDICINE

## 2022-03-22 PROCEDURE — 96374 THER/PROPH/DIAG INJ IV PUSH: CPT

## 2022-03-22 PROCEDURE — 6360000002 HC RX W HCPCS: Performed by: EMERGENCY MEDICINE

## 2022-03-22 PROCEDURE — 93010 ELECTROCARDIOGRAM REPORT: CPT | Performed by: INTERNAL MEDICINE

## 2022-03-22 RX ORDER — SODIUM CHLORIDE 9 MG/ML
25 INJECTION, SOLUTION INTRAVENOUS PRN
Status: DISCONTINUED | OUTPATIENT
Start: 2022-03-22 | End: 2022-03-26 | Stop reason: HOSPADM

## 2022-03-22 RX ORDER — CALCIUM CARBONATE 200(500)MG
1 TABLET,CHEWABLE ORAL PRN
COMMUNITY
End: 2022-04-01 | Stop reason: ALTCHOICE

## 2022-03-22 RX ORDER — ONDANSETRON 2 MG/ML
4 INJECTION INTRAMUSCULAR; INTRAVENOUS ONCE
Status: COMPLETED | OUTPATIENT
Start: 2022-03-22 | End: 2022-03-22

## 2022-03-22 RX ORDER — TAMSULOSIN HYDROCHLORIDE 0.4 MG/1
0.4 CAPSULE ORAL DAILY
Status: DISCONTINUED | OUTPATIENT
Start: 2022-03-22 | End: 2022-03-26 | Stop reason: HOSPADM

## 2022-03-22 RX ORDER — MORPHINE SULFATE 4 MG/ML
4 INJECTION, SOLUTION INTRAMUSCULAR; INTRAVENOUS ONCE
Status: COMPLETED | OUTPATIENT
Start: 2022-03-22 | End: 2022-03-22

## 2022-03-22 RX ORDER — SODIUM CHLORIDE, SODIUM LACTATE, POTASSIUM CHLORIDE, CALCIUM CHLORIDE 600; 310; 30; 20 MG/100ML; MG/100ML; MG/100ML; MG/100ML
INJECTION, SOLUTION INTRAVENOUS CONTINUOUS
Status: DISCONTINUED | OUTPATIENT
Start: 2022-03-22 | End: 2022-03-25

## 2022-03-22 RX ORDER — SODIUM CHLORIDE 0.9 % (FLUSH) 0.9 %
5-40 SYRINGE (ML) INJECTION EVERY 12 HOURS SCHEDULED
Status: DISCONTINUED | OUTPATIENT
Start: 2022-03-22 | End: 2022-03-26 | Stop reason: HOSPADM

## 2022-03-22 RX ORDER — SODIUM CHLORIDE, SODIUM LACTATE, POTASSIUM CHLORIDE, AND CALCIUM CHLORIDE .6; .31; .03; .02 G/100ML; G/100ML; G/100ML; G/100ML
1000 INJECTION, SOLUTION INTRAVENOUS ONCE
Status: COMPLETED | OUTPATIENT
Start: 2022-03-22 | End: 2022-03-22

## 2022-03-22 RX ORDER — SODIUM CHLORIDE, SODIUM LACTATE, POTASSIUM CHLORIDE, CALCIUM CHLORIDE 600; 310; 30; 20 MG/100ML; MG/100ML; MG/100ML; MG/100ML
INJECTION, SOLUTION INTRAVENOUS CONTINUOUS
Status: ACTIVE | OUTPATIENT
Start: 2022-03-22 | End: 2022-03-22

## 2022-03-22 RX ORDER — ONDANSETRON 4 MG/1
4 TABLET, ORALLY DISINTEGRATING ORAL EVERY 8 HOURS PRN
Status: DISCONTINUED | OUTPATIENT
Start: 2022-03-22 | End: 2022-03-26 | Stop reason: HOSPADM

## 2022-03-22 RX ORDER — ACETAMINOPHEN 325 MG/1
650 TABLET ORAL EVERY 4 HOURS PRN
Status: DISCONTINUED | OUTPATIENT
Start: 2022-03-22 | End: 2022-03-26 | Stop reason: HOSPADM

## 2022-03-22 RX ORDER — SODIUM CHLORIDE 0.9 % (FLUSH) 0.9 %
5-40 SYRINGE (ML) INJECTION PRN
Status: DISCONTINUED | OUTPATIENT
Start: 2022-03-22 | End: 2022-03-26 | Stop reason: HOSPADM

## 2022-03-22 RX ORDER — ONDANSETRON 2 MG/ML
4 INJECTION INTRAMUSCULAR; INTRAVENOUS EVERY 6 HOURS PRN
Status: DISCONTINUED | OUTPATIENT
Start: 2022-03-22 | End: 2022-03-26 | Stop reason: HOSPADM

## 2022-03-22 RX ADMIN — ENOXAPARIN SODIUM 40 MG: 100 INJECTION SUBCUTANEOUS at 15:10

## 2022-03-22 RX ADMIN — Medication 10 ML: at 13:09

## 2022-03-22 RX ADMIN — TAMSULOSIN HYDROCHLORIDE 0.4 MG: 0.4 CAPSULE ORAL at 23:25

## 2022-03-22 RX ADMIN — IOPAMIDOL 90 ML: 755 INJECTION, SOLUTION INTRAVENOUS at 08:43

## 2022-03-22 RX ADMIN — SODIUM CHLORIDE, POTASSIUM CHLORIDE, SODIUM LACTATE AND CALCIUM CHLORIDE: 600; 310; 30; 20 INJECTION, SOLUTION INTRAVENOUS at 13:09

## 2022-03-22 RX ADMIN — SODIUM CHLORIDE, SODIUM LACTATE, POTASSIUM CHLORIDE, AND CALCIUM CHLORIDE: 600; 310; 30; 20 INJECTION, SOLUTION INTRAVENOUS at 10:34

## 2022-03-22 RX ADMIN — MORPHINE SULFATE 4 MG: 4 INJECTION INTRAVENOUS at 10:21

## 2022-03-22 RX ADMIN — ONDANSETRON 4 MG: 2 INJECTION INTRAMUSCULAR; INTRAVENOUS at 07:32

## 2022-03-22 RX ADMIN — SODIUM CHLORIDE, POTASSIUM CHLORIDE, SODIUM LACTATE AND CALCIUM CHLORIDE: 600; 310; 30; 20 INJECTION, SOLUTION INTRAVENOUS at 19:44

## 2022-03-22 RX ADMIN — SODIUM CHLORIDE, SODIUM LACTATE, POTASSIUM CHLORIDE, AND CALCIUM CHLORIDE 1000 ML: 600; 310; 30; 20 INJECTION, SOLUTION INTRAVENOUS at 08:36

## 2022-03-22 ASSESSMENT — ENCOUNTER SYMPTOMS
CONSTIPATION: 1
COLOR CHANGE: 0
WHEEZING: 0
DIARRHEA: 0
BLOOD IN STOOL: 0
ABDOMINAL DISTENTION: 0
ABDOMINAL PAIN: 1
VOMITING: 0
COUGH: 0
NAUSEA: 1
SHORTNESS OF BREATH: 0

## 2022-03-22 ASSESSMENT — PAIN SCALES - GENERAL: PAINLEVEL_OUTOF10: 6

## 2022-03-22 NOTE — CONSULTS
Pt Name: Alonzo Osler  MRN: [id number]  [id number]  YOB: 1950  Admit Date: 3/22/2022  7:02 AM  Date of evaluation: 3/22/2022  Primary Care Physician: Feli Lama MD   03/03       Requesting Provider: Dr. Jeane Smith    GI Consult    Indication: Necrotizing pancreatitis. History:  The patient is a 73 y.o. male admitted to the hospital with multiple somatic symptoms. In the end of February he was admitted to the hospital with acute severe necrotizing pancreatitis. He was noted to have gallstone and it was thought that this is etiology of his pancreatitis as patient does not drink alcohol. Over the. Time clinically he feels better he was discharged home on March 12. He was unable to eat much since then. He claims that he tried to eat even liquid diet and he feels full after eating and drinking. 1 to 2 cans of Ensure and other nutrition supplements also makes him fall. He is nauseated. He denies any significant vomiting. There is no significant abdominal pain or cramping. He feels some tenderness if somebody pressed hard to his upper abdomen area. Some abdominal distention. He denies any dysphagia or odynophagia. He also claims that the food does not taste good. He did not have any solid food in the last several weeks. Lately he is having some symptoms of heartburns for which he take Tums with help. He is not having any bowel movements as he is not eating. No significant diarrhea. No hematochezia or melanotic stool. No fever chills. No chest pain or palpitation. He came to the emergency room few days back for urinary retention. Lemos catheter was placed. His lipase was noted to be mildly elevated. He was discharged home. Today he came to the emergency room for the same reason. Repeat labs noted mild further elevation of lipase. CT scan suggesting to large probably walled off necrosis present transverse colon and major vessels.       Past Medical History: Diagnosis Date    Metastatic renal cell carcinoma (HCC)     Nasopharyngeal mass     Metastatic renal cell carcinoma, clear cell     Past Surgical History:        Procedure Laterality Date    ADRENALECTOMY Left 10/2017    NOSE SURGERY  12/2017    Renal cell carcinoma, metastatic clear cell, April 2016, December 2017    PARTIAL NEPHRECTOMY Left 10/2017    t3a n0 m0     Allergies:  Nexium [esomeprazole magnesium] and Prilosec [omeprazole]  Home Meds:  Prior to Admission medications    Medication Sig Start Date End Date Taking?  Authorizing Provider   sulfamethoxazole-trimethoprim (BACTRIM DS) 800-160 MG per tablet Take 1 tablet by mouth 2 times daily for 7 days 3/15/22 3/22/22  Billy Cui MD   lipase-protease-amylase (CREON) 62737-415[address] units delayed release capsule Take 1 capsule by mouth 3 times daily (with meals) 3/12/22 4/11/22  Ti Gandhi MD   cloNIDine (CATAPRES) 0.1 MG tablet Take 1 tablet by mouth 3 times daily 3/12/22 4/11/22  Ti Gandhi MD   Multiple Vitamins-Minerals (THERAPEUTIC MULTIVITAMIN-MINERALS) tablet Take 1 tablet by mouth daily    Historical Provider, MD   vitamin E 1000 units capsule Take 1,000 Units by mouth daily    Historical Provider, MD   vitamin C (ASCORBIC ACID) 500 MG tablet Take 500 mg by mouth daily    Historical Provider, MD   niacin 100 MG tablet Take 100 mg by mouth daily (with breakfast)    Historical Provider, MD   famotidine (PEPCID) 20 MG tablet Take 1 tablet by mouth daily 5/10/21 2/28/22  Billy Cui MD   cetirizine (ZYRTEC) 10 MG tablet Take 10 mg by mouth    Historical Provider, MD   tamsulosin (FLOMAX) 0.4 MG capsule Take 1 capsule by mouth every other day  5/2/16   Historical Provider, MD        Current Meds:       sodium chloride flush  5-40 mL IntraVENous 2 times per day    enoxaparin  40 mg SubCUTAneous Q24H        lactated ringers 200 mL/hr at 03/22/22 1034    lactated ringers      Followed by   [facility] lactated ringers      sodium chloride           Social History:   Social History     Socioeconomic History    Marital status:      Spouse name: Not on file    Number of children: Not on file    Years of education: Not on file    Highest education level: Not on file   Occupational History    Not on file   Tobacco Use    Smoking status: Never Smoker    Smokeless tobacco: Never Used   Vaping Use    Vaping Use: Never used   Substance and Sexual Activity    Alcohol use: No    Drug use: No    Sexual activity: Not on file   Other Topics Concern    Not on file   Social History Narrative    Not on file     Social Determinants of Health     Financial Resource Strain:     Difficulty of Paying Living Expenses: Not on file   Food Insecurity:     Worried About [address] [employer] in the Last Year: Not on file    Fabio of Food in the Last Year: Not on file   Transportation Needs:     Lack of Transportation (Medical): Not on file    Lack of Transportation (Non-Medical):  Not on file   Physical Activity:     Days of Exercise per Week: Not on file    Minutes of Exercise per Session: Not on file   Stress:     Feeling of Stress : Not on file   Social Connections:     Frequency of Communication with Friends and Family: Not on file    Frequency of Social Gatherings with Friends and Family: Not on file    Attends Taoism Services: Not on file    Active Member of [address] [place] [address] [employer] or Organizations: Not on file    Attends Club or Organization Meetings: Not on file    Marital Status: Not on file   Intimate Partner Violence:     Fear of Current or Ex-Partner: Not on file    Emotionally Abused: Not on file    Physically Abused: Not on file    Sexually Abused: Not on file   Housing Stability:     Unable to Pay for Housing in the Last Year: Not on file    Number of [place] in the Last Year: Not on file    Unstable Housing in the Last Year: Not on file       Family History:   Family History   Problem Relation Age of Onset    Breast Cancer Mother     Breast Cancer Sister     Cancer Brother          ROS:  Fatigue, tiredness and weakness. Physical Exam:  BP (!) 145/74   Pulse 108   Temp 98.8 °F (37.1 °C) (Temporal)   Resp 20   Ht 6' 1\" (1.854 m)   Wt 197 lb (89.4 kg)   SpO2 93%   BMI 25.99 kg/m²     General appearance: alert and cooperative with exam, appears stated age, no acute distress   Head: normal cephalic, atraumatic. EOMI bilaterally, no neck lymphadenopathy appreciated, no carotid bruits noted  Lungs: Clear to percussion and auscultation. No wheezing or rales. Heart: S1 S2 are audible. No added sound. Abdomen: Soft, non distended and tender. No hepatosplenomegaly. Bowel sounds are audible. No masses palpable. Skin: warm, dry, no obvious rash, non-jaundice  Extremities: No cyanosis or clubbing or peripheral edema noted. Dorsalis pedis pulses were intact. Labs:     Recent Labs     03/22/22 0728   WBC 8.4   RBC 4.35*   HGB 12.9*   HCT 40.6*   MCV 93.3   MCH 29.7   MCHC 31.8*        Recent Labs     03/22/22 0728      K 4.5   ANIONGAP 16   CL 97*   CO2 23   BUN 18   CREATININE 0.9   GLUCOSE 127*   CALCIUM 9.9     No results for input(s): MG, PHOS in the last 72 hours. Recent Labs     03/22/22 0728   AST 38   ALT 55*   BILITOT 0.6   ALKPHOS 112     HgBA1c:  No components found for: HGBA1C  FLP:    Lab Results   Component Value Date    TRIG 49 03/01/2022    HDL 37 03/12/2021    LDLCALC 152 03/12/2021     TSH:    Lab Results   Component Value Date    TSH 0.607 03/05/2020     Troponin T: No results for input(s): TROPONINI in the last 72 hours. INR:   Recent Labs     03/22/22 0728   INR 1.16       Recent Labs     03/22/22 0728   LIPASE 215*       Radiology:    CT ABDOMEN PELVIS W IV CONTRAST Additional Contrast? None    Result Date: 3/22/2022  1. Recent acute pancreatitis with areas of walled off necrosis surrounding the pancreas.  This includes a fairly large area of walled off necrosis anterior to the pancreas, measuring up to 9.2 cm in greatest diameter. There is mass effect on the pancreas as well as the mid transverse colon, however, I do not see any significant inflammatory change along the colon or evidence of actual mechanical obstruction. The small bowel is nondilated. 2. Mesenteric vascular congestion with trace intraperitoneal free fluid which I believe is secondary to the mass effect on the SMV and proximal main portal vein. I do not see any portal vein thrombus. 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 3. Bilateral small layering pleural effusions. 4. Cholelithiasis. Signed by Dr Adria Barcenas:  Patient admitted to the hospital again with symptoms of loss of appetite, loss of taste, unable to eat due to feeling of fullness and discomfort. He is also nauseated. CT scan finding is somewhat worsened from his last admission. He does not have any significant pain. The etiology of his symptoms most likely due to extrinsic compression from these large fluid collection in and around the pancreas. Impression:  1. Acute necrotizing pancreatitis. 2.  Abnormal imaging studies. 3.  Nausea. 4.  Feeding difficulties. Plan:  1. Differential diagnosis patient symptoms discussed at length with him in the presence of his wife. 2.  Pancreatitis along with all the late complication and treatment options were discussed. Extrinsic compression to his gastric outlet as a cause of the above symptoms were discussed. 3.  Continue supportive treatment with clear liquid diet as tolerated. Intravenous rehydration. 4.  Case was discussed with Dr. Rory Howe. I will schedule patient for upper GI endoscopy with possible endoscopic ultrasound and cyst drainage if indicated.     Dayna Schultz MD  3/22/2022, 12:24 PM

## 2022-03-22 NOTE — H&P
[id number] [facility]      Hospitalist - History & Physical      PCP: Tena Aguila MD    Date of Admission: 3/22/2022    Date of Service: 3/22/2022    Chief Complaint:  Came in for Lemos catheter removal, Abdominal pain      History Of Present Illness: The patient is a 73 y.o. male with past medical history of Metastatic renal carcinoma, and recent pancreatitis who presented to [facility] ED complaining of abdominal pain and eating well in the past few days. [facility] for Lemos catheter removal from having urinary retention recently. Presented to ED on 3/18/2022 with complaints of urinary retention, and indwelling Lemos catheter was inserted at that time with recommendations of outpatient follow-up with urology or return to ED for Lemos removal. [facility] having [facility] on Feb 28, 2022 and being in hospital at that time. Stated his pancreatitis got somewhat better initially. Reported nausea and early satiety. Denied fever, chills, shortness of breath, chest pain, vomiting, or diarrhea. Work-up in ED revealed ALT 55, lipase 215, Hgb 12.9, CT abdomen showed recent acute pancreatitis with area of walled off necrosis surrounding the pancreas, mesenteric vascular congestion with trace intraperitoneal free fluid which is secondary to mass-effect on the SMV and proximal main portal vein, cholelithiasis. Patient was admitted to hospital medicine with acute pancreatitis with GI consultation. GI recommended supportive treatment with clear liquid diet, IV rehydration, upper GI endoscopy with possible endoscopic ultrasound and cyst drainage if indicated.         Past Medical History:        Diagnosis Date    Metastatic renal cell carcinoma (HCC)     Nasopharyngeal mass     Metastatic renal cell carcinoma, clear cell       Past Surgical History:        Procedure Laterality Date    ADRENALECTOMY Left 10/2017    NOSE SURGERY  12/2017    Renal cell carcinoma, metastatic clear cell, April 2016, December 2017    PARTIAL NEPHRECTOMY Left 10/2017    t3a n0 m0       Home Medications:  Prior to Admission medications    Medication Sig Start Date End Date Taking? Authorizing Provider   calcium carbonate (TUMS) 500 MG chewable tablet Take 1 tablet by mouth as needed for Heartburn   Yes Historical Provider, MD   lipase-protease-amylase (CREON) 14193-55198 units delayed release capsule Take 1 capsule by mouth 3 times daily (with meals) 3/12/22 4/11/22  Lucho Quinones MD   Multiple Vitamins-Minerals (THERAPEUTIC MULTIVITAMIN-MINERALS) tablet Take 1 tablet by mouth daily    Historical Provider, MD   vitamin E 1000 units capsule Take 1,000 Units by mouth daily    Historical Provider, MD   vitamin C (ASCORBIC ACID) 500 MG tablet Take 500 mg by mouth daily    Historical Provider, MD   niacin 100 MG tablet Take 100 mg by mouth daily (with breakfast)    Historical Provider, MD   famotidine (PEPCID) 20 MG tablet Take 1 tablet by mouth daily 5/10/21 2/28/22  Alvin Quintanilla MD   cetirizine (ZYRTEC) 10 MG tablet Take 10 mg by mouth    Historical Provider, MD   tamsulosin (FLOMAX) 0.4 MG capsule Take 1 capsule by mouth daily  5/2/16   Historical Provider, MD       Allergies:    Nexium [esomeprazole magnesium] and Prilosec [omeprazole]    Social History:    The patient currently lives at home  Tobacco:   reports that he has never smoked. He has never used smokeless tobacco.  Alcohol:   reports no history of alcohol use. Illicit Drugs: denies    Family History:      Problem Relation Age of Onset    Breast Cancer Mother     Breast Cancer Sister     Cancer Brother            Review of Systems   Constitutional: Negative for chills, diaphoresis, fatigue and fever. HENT: Negative for congestion and ear pain. Eyes: Negative for visual disturbance. Respiratory: Negative for cough, shortness of breath and wheezing.     Cardiovascular: Negative for chest pain, palpitations and leg swelling. Gastrointestinal: Positive for abdominal pain, constipation and nausea. Negative for abdominal distention, blood in stool, diarrhea and vomiting. Endocrine: Negative for cold intolerance and heat intolerance. Genitourinary: Positive for difficulty urinating. Negative for flank pain, frequency and urgency. Lemos in place currently    Musculoskeletal: Negative for arthralgias and myalgias. Skin: Negative for color change and wound. Neurological: Negative for dizziness, syncope, weakness, light-headedness, numbness and headaches. Hematological: Does not bruise/bleed easily. Psychiatric/Behavioral: Negative for agitation, confusion and dysphoric mood. Physical Examination:  BP (!) 153/67   Pulse 115   Temp 98.2 °F (36.8 °C) (Temporal)   Resp 14   Ht 6' 1\" (1.854 m)   Wt 197 lb (89.4 kg)   SpO2 92%   BMI 25.99 kg/m²     Physical Exam  Constitutional:       General: He is not in acute distress. Appearance: Normal appearance. He is not ill-appearing. HENT:      Head: Normocephalic and atraumatic. Right Ear: External ear normal.      Left Ear: External ear normal.      Nose: Nose normal.      Mouth/Throat:      Mouth: Mucous membranes are moist.   Eyes:      Extraocular Movements: Extraocular movements intact. Conjunctiva/sclera: Conjunctivae normal.      Pupils: Pupils are equal, round, and reactive to light. Cardiovascular:      Rate and Rhythm: Regular rhythm. Tachycardia present. Pulses: Normal pulses. Heart sounds: Normal heart sounds. Pulmonary:      Effort: Pulmonary effort is normal. No respiratory distress. Breath sounds: Normal breath sounds. No wheezing, rhonchi or rales. Abdominal:      General: Bowel sounds are normal. There is no distension. Palpations: Abdomen is soft. Tenderness: There is abdominal tenderness.    Genitourinary:     Comments: Lemos in place   Musculoskeletal:         General: No swelling, tenderness or deformity. Normal range of motion. Cervical back: Normal range of motion and neck supple. No muscular tenderness. Right lower leg: No edema. Left lower leg: No edema. Skin:     General: Skin is warm and dry. Findings: No bruising or lesion. Neurological:      Mental Status: He is alert and oriented to person, place, and time. Psychiatric:         Mood and Affect: Mood normal.         Behavior: Behavior normal.         Thought Content: Thought content normal.          Diagnostic Data:  CBC:  Recent Labs     03/22/22 0728   WBC 8.4   HGB 12.9*   HCT 40.6*        BMP:  Recent Labs     03/22/22 0728      K 4.5   CL 97*   CO2 23   BUN 18   CREATININE 0.9   CALCIUM 9.9     Recent Labs     03/22/22 0728   AST 38   ALT 55*   BILITOT 0.6   ALKPHOS 112     Coag Panel:   Recent Labs     03/22/22 0728   INR 1.16   PROTIME 14.9*         RAD:    CT ABDOMEN PELVIS W IV CONTRAST Additional Contrast? None  Result Date: 3/22/2022    1. Recent acute pancreatitis with areas of walled off necrosis surrounding the pancreas. This includes a fairly large area of walled off necrosis anterior to the pancreas, measuring up to 9.2 cm in greatest diameter. There is mass effect on the pancreas as well as the mid transverse colon, however, I do not see any significant inflammatory change along the colon or evidence of actual mechanical obstruction. The small bowel is nondilated. 2. Mesenteric vascular congestion with trace intraperitoneal free fluid which I believe is secondary to the mass effect on the SMV and proximal main portal vein. I do not see any portal vein thrombus. 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 3. Bilateral small layering pleural effusions. 4. Cholelithiasis. Signed by Dr Ben Soriano      Assessment/Plan:  Principal Problem:    Acute recurrent pancreatitis  Active Problems:    Urinary retention  Resolved Problems:    * No resolved hospital problems.  *       Principal Problem:    Acute recurrent pancreatitis-   - GI consultation and recommendations appreciated   - Clear liquids per GI  - IVF  - Triglycerides   - Monitor calcium level   - As needed pain control         Active Problems:    Urinary retention-    - Continue indwelling Lemos    - Monitor I's and O's closely    - Resume home Flomax    - Urology follow up outpatient          DVT Prophylaxis:  Lovenox     Further Orders per Clinical course/attending. Signed:  Electronically signed by RAY Quintero CNP on 3/22/22 at 11:14 AM CDT       EMR Dragon/Transcription disclaimer:   Much of this encounter note is an electronic transcription/translation of spoken language to printed text.  The electronic translation of spoken language may permit erroneous, or at times, nonsensical words or phrases to be inadvertently transcribed; although attempts have made to review the note for such errors, some may still exist.

## 2022-03-22 NOTE — ED NOTES
MD maria requesting ward cath removal.    Pt concerned with possible urinary retention following removal of ward and need for replacement. Pt wishes ward not be removed at this time. MD notified.      Chantell Dinero RN  03/22/22 0731

## 2022-03-22 NOTE — ED PROVIDER NOTES
[facility] EMERGENCY DEPT  eMERGENCY dEPARTMENT eNCOUnter      Pt Name: Samantha Leblanc  MRN: [id number]  [place] 1950  Date of evaluation: 3/22/2022  Provider: Yoana Urena MD    CHIEF COMPLAINT       Chief Complaint   Patient presents with    Urinary Catheter Problem     told to come back to  ER to get catheter out         HISTORY OF PRESENT ILLNESS   (Location/Symptom, Timing/Onset,Context/Setting, Quality, Duration, Modifying Factors, Severity)  Note limiting factors. Samantha Leblanc is a 73 y.o. male who presents to the emergency department with abdominal pain not eating. The patient came back to get his Lemos catheter out today I saw him last week after he had urinary retention. At that time he had no abdominal pain he was eating and drinking fine. The patient had recently been in the hospital for pancreatitis and cholecystitis. His labs were okay last week in terms of his biliary system and he had no abdominal pain after placement of a Leoms catheter. He came back to get the Lemos out today however he and his wife tell me he now has abdominal pain he cannot eat or drink the thought of eating makes him nauseated. He is had no urination problems and now his belly is tender. This is all new and different from about 3 to 4 days ago. He has no fevers. He has not been vomiting. He has gas passing but he is constipated he states. The history is provided by the patient, the spouse and medical records. NursingNotes were reviewed. REVIEW OF SYSTEMS    (2-9 systems for level 4, 10 or more for level 5)     Review of Systems   Constitutional: Negative for fever. Respiratory: Negative for cough and shortness of breath. Cardiovascular: Negative for chest pain. Gastrointestinal: Positive for abdominal pain, constipation and nausea. Genitourinary: Positive for difficulty urinating. Neurological: Negative for headaches. Psychiatric/Behavioral: Negative for confusion.        A complete review of systems was performed and is negative except as noted above in the HPI.        PAST MEDICAL HISTORY     Past Medical History:   Diagnosis Date    Metastatic renal cell carcinoma ([address]     Nasopharyngeal mass     Metastatic renal cell carcinoma, clear cell         SURGICAL HISTORY       Past Surgical History:   Procedure Laterality Date    ADRENALECTOMY Left 10/2017    NOSE SURGERY  12/2017    Renal cell carcinoma, metastatic clear cell, April 2016, December 2017    PARTIAL NEPHRECTOMY Left 10/2017    t3a n0 m0         CURRENT MEDICATIONS       Previous Medications    CETIRIZINE (ZYRTEC) 10 MG TABLET    Take 10 mg by mouth    CLONIDINE (CATAPRES) 0.1 MG TABLET    Take 1 tablet by mouth 3 times daily    FAMOTIDINE (PEPCID) 20 MG TABLET    Take 1 tablet by mouth daily    LIPASE-PROTEASE-AMYLASE (CREON) 65576-50839 UNITS DELAYED RELEASE CAPSULE    Take 1 capsule by mouth 3 times daily (with meals)    MULTIPLE VITAMINS-MINERALS (THERAPEUTIC MULTIVITAMIN-MINERALS) TABLET    Take 1 tablet by mouth daily    NIACIN 100 MG TABLET    Take 100 mg by mouth daily (with breakfast)    SULFAMETHOXAZOLE-TRIMETHOPRIM (BACTRIM DS) 800-160 MG PER TABLET    Take 1 tablet by mouth 2 times daily for 7 days    TAMSULOSIN (FLOMAX) 0.4 MG CAPSULE    Take 1 capsule by mouth every other day     VITAMIN C (ASCORBIC ACID) 500 MG TABLET    Take 500 mg by mouth daily    VITAMIN E 1000 UNITS CAPSULE    Take 1,000 Units by mouth daily       ALLERGIES     Nexium [esomeprazole magnesium] and Prilosec [omeprazole]    FAMILY HISTORY       Family History   Problem Relation Age of Onset    Breast Cancer Mother     Breast Cancer Sister     Cancer Brother           SOCIAL HISTORY       Social History     Socioeconomic History    Marital status:      Spouse name: None    Number of children: None    Years of education: None    Highest education level: None   Occupational History    None   Tobacco Use    Smoking status: Never Smoker    Smokeless tobacco: Never Used   Vaping Use    Vaping Use: Never used   Substance and Sexual Activity    Alcohol use: No    Drug use: No    Sexual activity: None   Other Topics Concern    None   Social History Narrative    None     Social Determinants of Health     Financial Resource Strain:     Difficulty of Paying Living Expenses: Not on file   Food Insecurity:     Worried About Running Out of Food in the Last Year: Not on file    Fabio of Food in the Last Year: Not on file   Transportation Needs:     Lack of Transportation (Medical): Not on file    Lack of Transportation (Non-Medical):  Not on file   Physical Activity:     Days of Exercise per Week: Not on file    Minutes of Exercise per Session: Not on file   Stress:     Feeling of Stress : Not on file   Social Connections:     Frequency of Communication with Friends and Family: Not on file    Frequency of Social Gatherings with Friends and Family: Not on file    Attends Christian Services: Not on file    Active Member of [address] [place] [address] or Organizations: Not on file    Attends Club or Organization Meetings: Not on file    Marital Status: Not on file   Intimate Partner Violence:     Fear of Current or Ex-Partner: Not on file    Emotionally Abused: Not on file    Physically Abused: Not on file    Sexually Abused: Not on file   Housing Stability:     Unable to Pay for Housing in the Last Year: Not on file    Number of [place] in the Last Year: Not on file    Unstable Housing in the Last Year: Not on file       SCREENINGS    [place] Coma Scale  Eye Opening: Spontaneous  Best Verbal Response: Oriented  Best Motor Response: Obeys commands  [place] Coma Scale Score: 15        PHYSICAL EXAM    (up to 7 for level 4, 8 or more for level 5)     ED Triage Vitals [03/22/22 0709]   BP Temp Temp src Pulse Resp SpO2 Height Weight   137/73 98.7 °F (37.1 °C) -- 112 14 92 % 6' 1\" (1.854 m) 197 lb (89.4 kg)       Physical Exam  Vitals and nursing note reviewed. Constitutional:       General: He is not in acute distress. Appearance: Normal appearance. HENT:      Head: Normocephalic and atraumatic. Eyes:      Extraocular Movements: Extraocular movements intact. Pupils: Pupils are equal, round, and reactive to light. Cardiovascular:      Rate and Rhythm: Regular rhythm. Tachycardia present. Comments: 120  Pulmonary:      Effort: Pulmonary effort is normal. No respiratory distress. Abdominal:      General: Abdomen is flat. Palpations: Abdomen is soft. Tenderness: There is abdominal tenderness. There is rebound. Comments: Pain with palpation completely different exam from last week. He hurts when I push on his belly diffusely. Genitourinary:     Comments: Indwelling Lemos catheter. Clear urine appearance  Musculoskeletal:         General: No tenderness. Normal range of motion. Cervical back: Normal range of motion and neck supple. Skin:     General: Skin is warm and dry. Capillary Refill: Capillary refill takes less than 2 seconds. Neurological:      General: No focal deficit present. Mental Status: He is alert and oriented to person, place, and time. GCS: GCS eye subscore is 4. GCS verbal subscore is 5. GCS motor subscore is 6. Psychiatric:         Mood and Affect: Mood normal.         Behavior: Behavior normal.      Comments: Very pleasant gentleman         DIAGNOSTIC RESULTS     EKG: All EKG's are interpreted by the Emergency Department Physician who either signs or Co-signs this chart in the absence of a cardiologist.    EKG sinus tachycardia rate 105  ms QRS 88 ms. Some mild low voltage no obvious ST elevation with some artifact on EKG.     RADIOLOGY:   Non-plain film images such as CT, Ultrasound and MRI are read by the radiologist. Plainradiographic images are visualized and preliminarily interpreted by the emergency physician with the below findings:        Interpretation per the Radiologist below, if available at the time of this note:    CT ABDOMEN PELVIS W IV CONTRAST Additional Contrast? None   Final Result   1. Recent acute pancreatitis with areas of walled off necrosis   surrounding the pancreas. This includes a fairly large area of walled   off necrosis anterior to the pancreas, measuring up to 9.2 cm in   greatest diameter. There is mass effect on the pancreas as well as the   mid transverse colon, however, I do not see any significant   inflammatory change along the colon or evidence of actual mechanical   obstruction. The small bowel is nondilated. 2. Mesenteric vascular congestion with trace intraperitoneal free   fluid which I believe is secondary to the mass effect on the SMV and   proximal main portal vein. I do not see any portal vein thrombus. 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 3. Bilateral small layering pleural effusions. 4. Cholelithiasis. Signed by Dr Erika Villafuerte            ED BEDSIDE ULTRASOUND:   Performed by ED Physician - none    LABS:  Labs Reviewed   COMPREHENSIVE METABOLIC PANEL - Abnormal; Notable for the following components:       Result Value    Chloride 97 (*)     Glucose 127 (*)     Total Protein 6.5 (*)     ALT 55 (*)     All other components within normal limits   CBC WITH AUTO DIFFERENTIAL - Abnormal; Notable for the following components:    RBC 4.35 (*)     Hemoglobin 12.9 (*)     Hematocrit 40.6 (*)     MCHC 31.8 (*)     All other components within normal limits   LIPASE - Abnormal; Notable for the following components:    Lipase 215 (*)     All other components within normal limits   PROTIME-INR - Abnormal; Notable for the following components:    Protime 14.9 (*)     All other components within normal limits   COVID-19, RAPID       All other labs were within normal range or not returned as of this dictation.     EMERGENCY DEPARTMENT COURSE and DIFFERENTIALDIAGNOSIS/MDM:   Vitals:    Vitals:    03/22/22 5432 03/22/22 1418 03/22/22 9822 BP: 137/73 (!) 146/69 136/76   Pulse: 112 112 100   Resp: 14 20 20   Temp: 98.7 °F (37.1 °C) 98.9 °F (37.2 °C)    TempSrc:  Temporal    SpO2: 92% 98% 95%   Weight: 197 lb (89.4 kg)     Height: 6' 1\" (1.854 m)         MDM  Number of Diagnoses or Management Options  Acute pancreatitis with uninfected necrosis, unspecified pancreatitis type: new and requires workup  Pleural effusion: new and requires workup  Diagnosis management comments: Patient with history of gallstone pancreatitis in the past post to follow-up with surgery to figure out when he is can get his gallbladder out. Patient saw me on Friday last week he had urinary retention. He did not have any belly pain his abdomen was benign today he really has probably a little bit of peritonitis from stranding and fluid in his belly. He does not have fever he is nontoxic-appearing however he is tachycardic. I think he is dehydrated as well. A CT scan shows again likely uninfected necrosis I discussed this with our on-call GI Dr. Korin Junior he agrees the patient needs admission for IV fluids and he can have a clear liquid diet. We can do pain control. And aggressive hydration. Patient agreeable to plan discussed with hospitalist for admission Dr. Maxi Fung. As above. Again his lipase is now more elevated his abdominal exam is different and is not eating and drinking well compared to when I saw him on Friday and he looks fine. 10:27 AM CDT  Patient refusing for catheter to come out at this time.        Amount and/or Complexity of Data Reviewed  Clinical lab tests: ordered and reviewed  Tests in the radiology section of CPT®: ordered and reviewed  Decide to obtain previous medical records or to obtain history from someone other than the patient: yes  Obtain history from someone other than the patient: yes  Discuss the patient with other providers: yes  Independent visualization of images, tracings, or specimens: yes    Risk of Complications, Morbidity, and/or Mortality  Presenting problems: high    Patient Progress  Patient progress: stable        CONSULTS:  IP CONSULT TO GI    PROCEDURES:  Unless otherwise notedbelow, none     Procedures    FINAL IMPRESSION     1. Acute pancreatitis with uninfected necrosis, unspecified pancreatitis type    2. Pleural effusion    3. Urinary retention          DISPOSITION/PLAN   DISPOSITION        PATIENT REFERRED TO:  No follow-up provider specified.     DISCHARGE MEDICATIONS:  New Prescriptions    No medications on file          (Please note that portions of this note were completed with a voice recognition program.  Efforts were made to edit the dictations butoccasionally words are mis-transcribed.)    Constanza Bazan MD (electronically signed)  Maryellen Silvestre MD  03/22/22 124 [facility] MD Tony  03/22/22 9109

## 2022-03-22 NOTE — ED NOTES
Patient placed in a gown  Patient placed on cardiac monitor, continuous pulse oximeter, and NIBP monitor. Monitor alarms on. Mask and gloves worn by staff while in pt room. Pt masked during all contact with staff.        Nasir Villela RN  03/22/22 6386

## 2022-03-23 ENCOUNTER — ANESTHESIA (OUTPATIENT)
Dept: ENDOSCOPY | Age: 72
DRG: 438 | End: 2022-03-23
Payer: MEDICARE

## 2022-03-23 ENCOUNTER — ANESTHESIA EVENT (OUTPATIENT)
Dept: ENDOSCOPY | Age: 72
DRG: 438 | End: 2022-03-23
Payer: MEDICARE

## 2022-03-23 VITALS
OXYGEN SATURATION: 96 % | DIASTOLIC BLOOD PRESSURE: 56 MMHG | SYSTOLIC BLOOD PRESSURE: 97 MMHG | RESPIRATION RATE: 17 BRPM

## 2022-03-23 LAB
ALBUMIN SERPL-MCNC: 3.1 G/DL (ref 3.5–5.2)
ALP BLD-CCNC: 92 U/L (ref 40–130)
ALT SERPL-CCNC: 38 U/L (ref 5–41)
AMYLASE: 512 U/L (ref 28–100)
ANION GAP SERPL CALCULATED.3IONS-SCNC: 14 MMOL/L (ref 7–19)
AST SERPL-CCNC: 27 U/L (ref 5–40)
BASOPHILS ABSOLUTE: 0.1 K/UL (ref 0–0.2)
BASOPHILS RELATIVE PERCENT: 0.7 % (ref 0–1)
BILIRUB SERPL-MCNC: 0.6 MG/DL (ref 0.2–1.2)
BUN BLDV-MCNC: 20 MG/DL (ref 8–23)
CALCIUM SERPL-MCNC: 9.9 MG/DL (ref 8.8–10.2)
CHLORIDE BLD-SCNC: 99 MMOL/L (ref 98–111)
CO2: 23 MMOL/L (ref 22–29)
CREAT SERPL-MCNC: 0.8 MG/DL (ref 0.5–1.2)
EOSINOPHILS ABSOLUTE: 0.2 K/UL (ref 0–0.6)
EOSINOPHILS RELATIVE PERCENT: 2.7 % (ref 0–5)
GFR AFRICAN AMERICAN: >59
GFR NON-AFRICAN AMERICAN: >60
GLUCOSE BLD-MCNC: 102 MG/DL (ref 74–109)
HCT VFR BLD CALC: 34 % (ref 42–52)
HEMOGLOBIN: 10.7 G/DL (ref 14–18)
IMMATURE GRANULOCYTES #: 0 K/UL
LIPASE: 291 U/L (ref 13–60)
LYMPHOCYTES ABSOLUTE: 1 K/UL (ref 1.1–4.5)
LYMPHOCYTES RELATIVE PERCENT: 14.5 % (ref 20–40)
MCH RBC QN AUTO: 29.8 PG (ref 27–31)
MCHC RBC AUTO-ENTMCNC: 31.5 G/DL (ref 33–37)
MCV RBC AUTO: 94.7 FL (ref 80–94)
MONOCYTES ABSOLUTE: 0.6 K/UL (ref 0–0.9)
MONOCYTES RELATIVE PERCENT: 7.8 % (ref 0–10)
NEUTROPHILS ABSOLUTE: 5.2 K/UL (ref 1.5–7.5)
NEUTROPHILS RELATIVE PERCENT: 73.9 % (ref 50–65)
PDW BLD-RTO: 13.8 % (ref 11.5–14.5)
PLATELET # BLD: 224 K/UL (ref 130–400)
PMV BLD AUTO: 10.2 FL (ref 9.4–12.4)
POTASSIUM REFLEX MAGNESIUM: 4.7 MMOL/L (ref 3.5–5)
RBC # BLD: 3.59 M/UL (ref 4.7–6.1)
SODIUM BLD-SCNC: 136 MMOL/L (ref 136–145)
TOTAL PROTEIN: 5.3 G/DL (ref 6.6–8.7)
TRIGL SERPL-MCNC: 89 MG/DL (ref 0–149)
WBC # BLD: 7.1 K/UL (ref 4.8–10.8)

## 2022-03-23 PROCEDURE — 87015 SPECIMEN INFECT AGNT CONCNTJ: CPT

## 2022-03-23 PROCEDURE — 43246 EGD PLACE GASTROSTOMY TUBE: CPT | Performed by: INTERNAL MEDICINE

## 2022-03-23 PROCEDURE — 85025 COMPLETE CBC W/AUTO DIFF WBC: CPT

## 2022-03-23 PROCEDURE — 7100000001 HC PACU RECOVERY - ADDTL 15 MIN: Performed by: INTERNAL MEDICINE

## 2022-03-23 PROCEDURE — 43242 EGD US FINE NEEDLE BX/ASPIR: CPT | Performed by: INTERNAL MEDICINE

## 2022-03-23 PROCEDURE — 3609017100 HC EGD: Performed by: INTERNAL MEDICINE

## 2022-03-23 PROCEDURE — 3700000000 HC ANESTHESIA ATTENDED CARE: Performed by: INTERNAL MEDICINE

## 2022-03-23 PROCEDURE — 87205 SMEAR GRAM STAIN: CPT

## 2022-03-23 PROCEDURE — 3609018500 HC EGD US SCOPE W/ADJACENT STRUCTURES: Performed by: INTERNAL MEDICINE

## 2022-03-23 PROCEDURE — C9113 INJ PANTOPRAZOLE SODIUM, VIA: HCPCS | Performed by: INTERNAL MEDICINE

## 2022-03-23 PROCEDURE — 87075 CULTR BACTERIA EXCEPT BLOOD: CPT

## 2022-03-23 PROCEDURE — 3700000001 HC ADD 15 MINUTES (ANESTHESIA): Performed by: INTERNAL MEDICINE

## 2022-03-23 PROCEDURE — 1210000000 HC MED SURG R&B

## 2022-03-23 PROCEDURE — 2500000003 HC RX 250 WO HCPCS: Performed by: NURSE ANESTHETIST, CERTIFIED REGISTERED

## 2022-03-23 PROCEDURE — 82150 ASSAY OF AMYLASE: CPT

## 2022-03-23 PROCEDURE — 36415 COLL VENOUS BLD VENIPUNCTURE: CPT

## 2022-03-23 PROCEDURE — 84478 ASSAY OF TRIGLYCERIDES: CPT

## 2022-03-23 PROCEDURE — 2580000003 HC RX 258: Performed by: INTERNAL MEDICINE

## 2022-03-23 PROCEDURE — 87070 CULTURE OTHR SPECIMN AEROBIC: CPT

## 2022-03-23 PROCEDURE — 6360000002 HC RX W HCPCS: Performed by: NURSE ANESTHETIST, CERTIFIED REGISTERED

## 2022-03-23 PROCEDURE — 6360000002 HC RX W HCPCS: Performed by: INTERNAL MEDICINE

## 2022-03-23 PROCEDURE — 0DH68UZ INSERTION OF FEEDING DEVICE INTO STOMACH, VIA NATURAL OR ARTIFICIAL OPENING ENDOSCOPIC: ICD-10-PCS | Performed by: INTERNAL MEDICINE

## 2022-03-23 PROCEDURE — 7100000000 HC PACU RECOVERY - FIRST 15 MIN: Performed by: INTERNAL MEDICINE

## 2022-03-23 PROCEDURE — 2580000003 HC RX 258: Performed by: NURSE PRACTITIONER

## 2022-03-23 PROCEDURE — 83690 ASSAY OF LIPASE: CPT

## 2022-03-23 PROCEDURE — 2709999900 HC NON-CHARGEABLE SUPPLY: Performed by: INTERNAL MEDICINE

## 2022-03-23 PROCEDURE — 80053 COMPREHEN METABOLIC PANEL: CPT

## 2022-03-23 PROCEDURE — 2720000010 HC SURG SUPPLY STERILE: Performed by: INTERNAL MEDICINE

## 2022-03-23 PROCEDURE — 0D968ZX DRAINAGE OF STOMACH, VIA NATURAL OR ARTIFICIAL OPENING ENDOSCOPIC, DIAGNOSTIC: ICD-10-PCS | Performed by: INTERNAL MEDICINE

## 2022-03-23 RX ORDER — MEPERIDINE HYDROCHLORIDE 25 MG/ML
12.5 INJECTION INTRAMUSCULAR; INTRAVENOUS; SUBCUTANEOUS EVERY 5 MIN PRN
Status: DISCONTINUED | OUTPATIENT
Start: 2022-03-23 | End: 2022-03-23 | Stop reason: HOSPADM

## 2022-03-23 RX ORDER — MORPHINE SULFATE 4 MG/ML
4 INJECTION, SOLUTION INTRAMUSCULAR; INTRAVENOUS EVERY 5 MIN PRN
Status: DISCONTINUED | OUTPATIENT
Start: 2022-03-23 | End: 2022-03-23 | Stop reason: HOSPADM

## 2022-03-23 RX ORDER — CIPROFLOXACIN 2 MG/ML
INJECTION, SOLUTION INTRAVENOUS PRN
Status: DISCONTINUED | OUTPATIENT
Start: 2022-03-23 | End: 2022-03-23 | Stop reason: SDUPTHER

## 2022-03-23 RX ORDER — SCOLOPAMINE TRANSDERMAL SYSTEM 1 MG/1
1 PATCH, EXTENDED RELEASE TRANSDERMAL
Status: DISCONTINUED | OUTPATIENT
Start: 2022-03-23 | End: 2022-03-23 | Stop reason: HOSPADM

## 2022-03-23 RX ORDER — DIPHENHYDRAMINE HYDROCHLORIDE 50 MG/ML
12.5 INJECTION INTRAMUSCULAR; INTRAVENOUS
Status: DISCONTINUED | OUTPATIENT
Start: 2022-03-23 | End: 2022-03-23 | Stop reason: HOSPADM

## 2022-03-23 RX ORDER — FENTANYL CITRATE 50 UG/ML
INJECTION, SOLUTION INTRAMUSCULAR; INTRAVENOUS PRN
Status: DISCONTINUED | OUTPATIENT
Start: 2022-03-23 | End: 2022-03-23 | Stop reason: SDUPTHER

## 2022-03-23 RX ORDER — METOCLOPRAMIDE HYDROCHLORIDE 5 MG/ML
10 INJECTION INTRAMUSCULAR; INTRAVENOUS
Status: DISCONTINUED | OUTPATIENT
Start: 2022-03-23 | End: 2022-03-23 | Stop reason: HOSPADM

## 2022-03-23 RX ORDER — LIDOCAINE HYDROCHLORIDE 10 MG/ML
INJECTION, SOLUTION INFILTRATION; PERINEURAL PRN
Status: DISCONTINUED | OUTPATIENT
Start: 2022-03-23 | End: 2022-03-23 | Stop reason: SDUPTHER

## 2022-03-23 RX ORDER — FAMOTIDINE 20 MG/1
20 TABLET, FILM COATED ORAL ONCE
Status: DISCONTINUED | OUTPATIENT
Start: 2022-03-23 | End: 2022-03-23 | Stop reason: HOSPADM

## 2022-03-23 RX ORDER — LIDOCAINE HYDROCHLORIDE 10 MG/ML
1 INJECTION, SOLUTION EPIDURAL; INFILTRATION; INTRACAUDAL; PERINEURAL
Status: DISCONTINUED | OUTPATIENT
Start: 2022-03-23 | End: 2022-03-23 | Stop reason: HOSPADM

## 2022-03-23 RX ORDER — CIPROFLOXACIN 2 MG/ML
400 INJECTION, SOLUTION INTRAVENOUS ONCE
Status: COMPLETED | OUTPATIENT
Start: 2022-03-23 | End: 2022-03-23

## 2022-03-23 RX ORDER — ONDANSETRON 2 MG/ML
INJECTION INTRAMUSCULAR; INTRAVENOUS PRN
Status: DISCONTINUED | OUTPATIENT
Start: 2022-03-23 | End: 2022-03-23 | Stop reason: SDUPTHER

## 2022-03-23 RX ORDER — MIDAZOLAM HYDROCHLORIDE 1 MG/ML
2 INJECTION INTRAMUSCULAR; INTRAVENOUS
Status: DISCONTINUED | OUTPATIENT
Start: 2022-03-23 | End: 2022-03-23 | Stop reason: HOSPADM

## 2022-03-23 RX ORDER — MIDAZOLAM HYDROCHLORIDE 1 MG/ML
INJECTION INTRAMUSCULAR; INTRAVENOUS PRN
Status: DISCONTINUED | OUTPATIENT
Start: 2022-03-23 | End: 2022-03-23 | Stop reason: SDUPTHER

## 2022-03-23 RX ORDER — MORPHINE SULFATE 2 MG/ML
2 INJECTION, SOLUTION INTRAMUSCULAR; INTRAVENOUS EVERY 5 MIN PRN
Status: DISCONTINUED | OUTPATIENT
Start: 2022-03-23 | End: 2022-03-23 | Stop reason: HOSPADM

## 2022-03-23 RX ORDER — PROPOFOL 10 MG/ML
INJECTION, EMULSION INTRAVENOUS CONTINUOUS PRN
Status: DISCONTINUED | OUTPATIENT
Start: 2022-03-23 | End: 2022-03-23 | Stop reason: SDUPTHER

## 2022-03-23 RX ADMIN — CIPROFLOXACIN 400 MG: 2 INJECTION INTRAVENOUS at 12:23

## 2022-03-23 RX ADMIN — SODIUM CHLORIDE, POTASSIUM CHLORIDE, SODIUM LACTATE AND CALCIUM CHLORIDE: 600; 310; 30; 20 INJECTION, SOLUTION INTRAVENOUS at 02:28

## 2022-03-23 RX ADMIN — FENTANYL CITRATE 100 MCG: 50 INJECTION INTRAMUSCULAR; INTRAVENOUS at 11:25

## 2022-03-23 RX ADMIN — ONDANSETRON 4 MG: 2 INJECTION INTRAMUSCULAR; INTRAVENOUS at 11:27

## 2022-03-23 RX ADMIN — CIPROFLOXACIN 400 MG: 2 INJECTION, SOLUTION INTRAVENOUS at 16:19

## 2022-03-23 RX ADMIN — SODIUM CHLORIDE, POTASSIUM CHLORIDE, SODIUM LACTATE AND CALCIUM CHLORIDE: 600; 310; 30; 20 INJECTION, SOLUTION INTRAVENOUS at 08:31

## 2022-03-23 RX ADMIN — MIDAZOLAM 2 MG: 1 INJECTION INTRAMUSCULAR; INTRAVENOUS at 11:27

## 2022-03-23 RX ADMIN — SODIUM CHLORIDE, PRESERVATIVE FREE 40 MG: 5 INJECTION INTRAVENOUS at 14:39

## 2022-03-23 RX ADMIN — LIDOCAINE HYDROCHLORIDE 40 MG: 10 INJECTION, SOLUTION INFILTRATION; PERINEURAL at 11:28

## 2022-03-23 RX ADMIN — PROPOFOL 120 MCG/KG/MIN: 10 INJECTION, EMULSION INTRAVENOUS at 11:28

## 2022-03-23 RX ADMIN — SODIUM CHLORIDE, POTASSIUM CHLORIDE, SODIUM LACTATE AND CALCIUM CHLORIDE: 600; 310; 30; 20 INJECTION, SOLUTION INTRAVENOUS at 19:32

## 2022-03-23 ASSESSMENT — ENCOUNTER SYMPTOMS
ABDOMINAL DISTENTION: 0
BLOOD IN STOOL: 0
COLOR CHANGE: 0
ABDOMINAL PAIN: 0
SHORTNESS OF BREATH: 0
VOMITING: 0
NAUSEA: 0
SHORTNESS OF BREATH: 0
COUGH: 0
DIARRHEA: 0
CONSTIPATION: 0
WHEEZING: 0

## 2022-03-23 ASSESSMENT — LIFESTYLE VARIABLES: SMOKING_STATUS: 0

## 2022-03-23 ASSESSMENT — PAIN SCALES - GENERAL: PAINLEVEL_OUTOF10: 0

## 2022-03-23 NOTE — OP NOTE
Referring/Primary Care Provider: Sharene Brunner, MD,     Date of Procedure: 03/23/22    Procedure:   1. EGD with Endoscopic Ultrasound with FNA of pancreatic fluid collection  2. Endoscopic placement of post pyloric feeding tube     Indications:   1. Recent severe pancreatitis with development of organizing fluid collections. 2. Patient with increasing fullness, reflux and decreased appetite     Anesthesia:  Sedation was administered by anesthesia who monitored the patient during the procedure. Procedure:   After reviewing the patient's chart, H&P, medications, obtaining informed consent, and discussing risks benefits and alternatives to the procedure the patient was placed in the left lateral decubitus position. A oblique viewing Olympus 140 Linear EUS scope was lubricated and inserted through the mouth into the oropharynx. Under indirect visualization, the upper esophagus was intubated. The scope was advanced to the level of the third portion of duodenum with limited views of the esophageal mucosa. Findings and maneuvers are listed in impression below. The patient tolerated the procedure well. There were no immediate complications. Findings:   Endoscopic Finding:   - esophagus appears normal  - Upon entering the stomach, there was a very large amount of thick green fluid and some food contents remaining in the stomach. As much as possible this was lavaged and suctioned with removal of 90% of contents. - The gastric lumen appear to have evidence of extrinsic compression most notable in the distal stomach. The pylorus is patent but I am suspicious there is a functional gastric outlet obstruction from the extrinsic fluid collections seen on CT.   - the duodenum appears significantly inflamed with mild-moderate duodenitis throughout the entire 1st/2nd portions of the duodenum.  The scope was able to pass into the distal duodenum     Endosonographic Findings:  - The celiac axis and associated vascular structures was identified and examined. No concerning or malignant lymphadenopathy was identified.     - Limited views of the left lobe of the liver revealed no biliary dilation. I did not appreciate any focal hepatic lesion.     - From the gastric lumen, there was evidence of severe inflammatory changes consistent with CT scan. Multiple areas of norma-gastric inflammation and fluid was noted. The previously described fluid collections were seen measuring >5cm in dimension. These fluid collections were very heterogenous and not all anechoic fluid areas. Multiple pockets of 2-3cm of more typical anechoic fluid was noted. From the area of extrinsic compression, FNA of the fluid collection was performed with a 22G FNA needle. A stylet was used. 20cc of suction was applied. Over the course of several minutes, approximately 50-60cc of thick brown fluid was removed and send for cytology and culture. No miranda pus was removed. The fluid filled portions of the inflammatory collections appeared to have improved well. - Pancreas: limited visualization of the pancreatic gland itself showed no obvious mass. DHT placement - due to concerns for nutrition and possible gastric outlet obstruction, I elected to place a post pyloric feeding tube. A small suture was placed through the distal tip of a DHT, lubricated and passed into the right nares. The tube was passed into the upper GI tract with no difficulty. Using the forward viewing EGD scope, the suture loop was grasped with an endoscopic clip and advanced without difficulty to the distal duodenum. The clip was deployed into healthy duodenal mucosa and the scope removed. The [address] placement was confirmed endoscopically as it passed through the pylorus and into the duodenum. Estimated Blood Loss: minimal    IMPRESSION:  1. Pancreatic fluid collections appearing to cause extrinsic compression on the distal stomach s/p FNA as above.    2. Successful placement of post pyloric DHT due to concerns for functional gastric outlet obstruction  3. Duodenitis. RECOMMENDATIONS:   - PPI twice daily  - Dietician to see for initiation of tube feeds  - Clear liquid diet for oral comfort  - If patient's symptoms continue to worsen, he may require repeat contrasted imaging in 1 week and/or transfer to tertiary care center if feeding status, nutrition, etc continue to worsen. The results were discussed with the patient and family. A copy of the images obtained were given to the patient.      Johnny Lopez MD  03/23/22  12:50 PM

## 2022-03-23 NOTE — ANESTHESIA PRE PROCEDURE
Department of Anesthesiology  Preprocedure Note       Name:  Leigha Kolb   Age:  73 y.o.  :  1950                                          MRN:  [id number]         Date:  3/23/2022      Surgeon: Jaswant Jewell):  Charlie Noland MD    Procedure: Procedure(s):  EGD ESOPHAGOGASTRODUODENOSCOPY  ENDOSCOPIC ULTRASOUND    Medications prior to admission:   Prior to Admission medications    Medication Sig Start Date End Date Taking?  Authorizing Provider   calcium carbonate (TUMS) 500 MG chewable tablet Take 1 tablet by mouth as needed for Heartburn   Yes Historical Provider, MD   lipase-protease-amylase (CREON) 59751-64127 units delayed release capsule Take 1 capsule by mouth 3 times daily (with meals) 3/12/22 4/11/22  Giselle Rutherford MD   Multiple Vitamins-Minerals (THERAPEUTIC MULTIVITAMIN-MINERALS) tablet Take 1 tablet by mouth daily    Historical Provider, MD   vitamin E 1000 units capsule Take 1,000 Units by mouth daily    Historical Provider, MD   vitamin C (ASCORBIC ACID) 500 MG tablet Take 500 mg by mouth daily    Historical Provider, MD   niacin 100 MG tablet Take 100 mg by mouth daily (with breakfast)    Historical Provider, MD   famotidine (PEPCID) 20 MG tablet Take 1 tablet by mouth daily 5/10/21 2/28/22  Andres Davila MD   cetirizine (ZYRTEC) 10 MG tablet Take 10 mg by mouth    Historical Provider, MD   tamsulosin (FLOMAX) 0.4 MG capsule Take 1 capsule by mouth daily  16   Historical Provider, MD       Current medications:    Current Facility-Administered Medications   Medication Dose Route Frequency Provider Last Rate Last Admin    lactated ringers infusion   IntraVENous Continuous RAY Weldon  mL/hr at 22 1114 NoRateChange at 22 1114    sodium chloride flush 0.9 % injection 5-40 mL  5-40 mL IntraVENous 2 times per day RAY Weldon CNP   10 mL at 22 1309    sodium chloride flush 0.9 % injection 5-40 mL  5-40 mL IntraVENous PRN Saw THORPE Jose Serum, APRN - CNP        0.9 % sodium chloride infusion  25 mL IntraVENous PRN Neisha Sellers, APRN - CNP        acetaminophen (TYLENOL) tablet 650 mg  650 mg Oral Q4H PRN Neisha Sellers, APRN - CNP        ondansetron (ZOFRAN-ODT) disintegrating tablet 4 mg  4 mg Oral Q8H PRN Neisha Sellers, APRN - CNP        Or    ondansetron (ZOFRAN) injection 4 mg  4 mg IntraVENous Q6H PRN Neisha Sellers, APRN - CNP        enoxaparin (LOVENOX) injection 40 mg  40 mg SubCUTAneous Q24H Neisha Sellers, APRN - CNP   40 mg at 03/22/22 1510    tamsulosin (FLOMAX) capsule 0.4 mg  0.4 mg Oral Daily Neisha Sellers, APRN - CNP   0.4 mg at 03/22/22 2325       Allergies: Allergies   Allergen Reactions    Nexium [Esomeprazole Magnesium] Rash    Prilosec [Omeprazole] Rash       Problem List:    Patient Active Problem List   Diagnosis Code    Pneumococcal vaccination declined Z28.21    Pancreatitis, gallstone K85.10    Acute pancreatitis K85.90    Cholelithiasis K80.20    Dehydration E86.0    Abdominal pain R10.9    Acute recurrent pancreatitis K85.90    Urinary retention R33.9       Past Medical History:        Diagnosis Date    Metastatic renal cell carcinoma ([facility] [address]     Nasopharyngeal mass     Metastatic renal cell carcinoma, clear cell       Past Surgical History:        Procedure Laterality Date    ADRENALECTOMY Left 10/2017    COLONOSCOPY  12/09/2019    Dr Salguero Beer: Prolapsed Thrombosed Hemorrhoids    NOSE SURGERY  12/2017    Renal cell carcinoma, metastatic clear cell, April 2016, December 2017    PARTIAL NEPHRECTOMY Left 10/2017    t3a n0 m0       Social History:    Social History     Tobacco Use    Smoking status: Never Smoker    Smokeless tobacco: Never Used   Substance Use Topics    Alcohol use:  No                                Counseling given: Not Answered      Vital Signs (Current):   Vitals:    03/22/22 1448 03/22/22 1832 03/23/22 0207 03/23/22 0607   BP: (!) 153/67 (!) 145/66 (!) 150/58 (!) 144/70   Pulse: 115 114 113 110   Resp: 14 18 18 18   Temp: 98.2 °F (36.8 °C) 98.6 °F (37 °C) 96.4 °F (35.8 °C) 97.5 °F (36.4 °C)   TempSrc: Temporal Temporal Temporal Temporal   SpO2: 92% 90% 95% 93%   Weight:       Height:                                                  BP Readings from Last 3 Encounters:   03/23/22 (!) 144/70   03/18/22 132/69   03/12/22 (!) 150/80       NPO Status:                                                                                 BMI:   Wt Readings from Last 3 Encounters:   03/22/22 197 lb (89.4 kg)   03/18/22 210 lb (95.3 kg)   03/01/22 210 lb 14.4 oz (95.7 kg)     Body mass index is 25.99 kg/m². CBC:   Lab Results   Component Value Date    WBC 7.1 03/23/2022    RBC 3.59 03/23/2022    HGB 10.7 03/23/2022    HCT 34.0 03/23/2022    MCV 94.7 03/23/2022    RDW 13.8 03/23/2022     03/23/2022       CMP:   Lab Results   Component Value Date     03/23/2022    K 4.7 03/23/2022    CL 99 03/23/2022    CO2 23 03/23/2022    BUN 20 03/23/2022    CREATININE 0.8 03/23/2022    GFRAA >59 03/23/2022    LABGLOM >60 03/23/2022    GLUCOSE 102 03/23/2022    PROT 5.3 03/23/2022    CALCIUM 9.9 03/23/2022    BILITOT 0.6 03/23/2022    ALKPHOS 92 03/23/2022    AST 27 03/23/2022    ALT 38 03/23/2022       POC Tests: No results for input(s): POCGLU, POCNA, POCK, POCCL, POCBUN, POCHEMO, POCHCT in the last 72 hours.     Coags:   Lab Results   Component Value Date    PROTIME 14.9 03/22/2022    INR 1.16 03/22/2022       HCG (If Applicable): No results found for: PREGTESTUR, PREGSERUM, HCG, HCGQUANT     ABGs: No results found for: PHART, PO2ART, LPF0VUZ, AZO1MUF, BEART, S3TPSDBA     Type & Screen (If Applicable):  No results found for: LABABO, LABRH    Drug/Infectious Status (If Applicable):  No results found for: HIV, HEPCAB    COVID-19 Screening (If Applicable):   Lab Results   Component Value Date    COVID19 Not Detected 03/22/2022           Anesthesia Evaluation  Patient summary reviewed and Nursing notes reviewed no history of anesthetic complications:   Airway: Mallampati: II  TM distance: >3 FB   Neck ROM: full  Mouth opening: > = 3 FB Dental: normal exam         Pulmonary:Negative Pulmonary ROS and normal exam  breath sounds clear to auscultation      (-) shortness of breath and not a current smoker          Patient did not smoke on day of surgery. Cardiovascular:        (-) hypertension, CAD,  angina and  CHF    NYHA Classification: I  ECG reviewed  Rhythm: regular  Rate: normal           Beta Blocker:  Not on Beta Blocker         Neuro/Psych:   Negative Neuro/Psych ROS     (-) seizures, CVA and depression/anxiety            GI/Hepatic/Renal:   (+) GERD: poorly controlled, renal disease (clear cell carcinoma ):,      (-) hiatal hernia      ROS comment: Recent acute pancreatitis with areas of walled off necrosis  surrounding the pancreas. This includes a fairly large area of walled  off necrosis anterior to the pancreas, measuring up to 9.2 cm in  greatest diameter. There is mass effect on the pancreas as well as the  mid transverse colon, however, I do not see any significant  inflammatory change along the colon or evidence of actual mechanical  obstruction. The small bowel is nondilated. 2. Mesenteric vascular congestion with trace intraperitoneal free  fluid which I believe is secondary to the mass effect on the SMV and  proximal main portal vein. I do not see any portal vein thrombus. 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 3. Bilateral small layering pleural effusions. 4. Cholelithiasis. Signed by Dr Jules Perea    . Endo/Other: Negative Endo/Other ROS   (+) blood dyscrasia: anemia:., .          Pt had PAT visit. Abdominal:       Abdomen: soft. Vascular: Other Findings:           Anesthesia Plan      general and TIVA     ASA 3     (Iv zofran within 30 min of closing )  Induction: intravenous.     MIPS: Postoperative opioids intended and Prophylactic antiemetics administered. Anesthetic plan and risks discussed with patient. Use of blood products discussed with patient whom. Plan discussed with CRNA.     Attending anesthesiologist reviewed and agrees with Pre Eval content            Luis Alfredo Russo MD   3/23/2022

## 2022-03-23 NOTE — PROGRESS NOTES
[facility]      [facility] Note    Patient:  Asim Jacobs  YOB: 1950  Date of Service: 3/23/2022  MRN: [id number]   Acct: [de-identified]   Primary Care Physician: Sharene Brunner, MD  Advance Directive: Full Code  Admit Date: 3/22/2022       Hospital Day: 1    Portions of this note have been copied forward, however, updated to reflect the most current clinical status of this patient. CHIEF COMPLAINT Abdominal pain    SUBJECTIVE:   [facility] was resting in bed this morning. Reported indigestion. Denies nausea, vomiting, fever, or chills. Denies abdominal pain at this time. Anxiously awaiting EGD. CUMULATIVE HOSPITAL COURSE:   The patient is a 73 y.o. male with past medical history of Metastatic renal carcinoma, and recent pancreatitis who presented to [facility] ED complaining of abdominal pain and eating well in the past few days.  [facility] reported he initially presented to ED for Lemos catheter removal from having urinary retention recently. Presented to ED on 3/18/2022 with complaints of urinary retention, and indwelling Lemos catheter was inserted at that time with recommendations of outpatient follow-up with urology or return to ED for Lemos removal.  [facility] reported having pancreatitis on Feb 28, 2022 and being hospitalized at that time. Stated his pancreatitis got somewhat better initially. Reported nausea and early satiety. Denied fever, chills, shortness of breath, chest pain, vomiting, or diarrhea. Work-up in ED revealed ALT 55, lipase 215, Hgb 12.9, CT abdomen showed recent acute pancreatitis with area of walled off necrosis surrounding the pancreas, mesenteric vascular congestion with trace intraperitoneal free fluid which is secondary to mass-effect on the SMV and proximal main portal vein, cholelithiasis. Patient was admitted to hospital medicine with acute pancreatitis with GI consultation.   GI recommended supportive treatment with clear liquid diet, IV rehydration, upper GI endoscopy with possible endoscopic ultrasound and cyst drainage if indicated. Patient underwent EGD with findings of gastric lumen with appearance of extrinsic compression most notable in the distal stomach, suspicious for a functional gastric outlet obstruction from the extrinsic fluid collections seen on CT, duodenum appears significantly inflamed with mild-moderate duodenitis. S/p FNA of pancreatic fluid collection and endoscopic placement of post pyloric feeding tube per GI. GI recommended PPI BID, dietician for initiation of tube feeds, clear liquid diet for oral comfort. Review of Systems   Constitutional: Negative for chills, diaphoresis, fatigue and fever. HENT: Negative for congestion and ear pain. Eyes: Negative for visual disturbance. Respiratory: Negative for cough, shortness of breath and wheezing. Cardiovascular: Negative for chest pain, palpitations and leg swelling. Gastrointestinal: Negative for abdominal distention, abdominal pain, blood in stool, constipation, diarrhea, nausea and vomiting. Reports indigestion   Denies N/V   Endocrine: Negative for cold intolerance and heat intolerance. Genitourinary: Negative for difficulty urinating, flank pain, frequency and urgency. Musculoskeletal: Negative for arthralgias and myalgias. Skin: Negative for color change and wound. Neurological: Negative for dizziness, syncope, weakness, light-headedness, numbness and headaches. Hematological: Does not bruise/bleed easily. Psychiatric/Behavioral: Negative for agitation, confusion and dysphoric mood.         Objective:   VITALS:  /66   Pulse 92   Temp 97.6 °F (36.4 °C) (Temporal)   Resp 18   Ht 6' 1\" (1.854 m)   Wt 197 lb (89.4 kg)   SpO2 92%   BMI 25.99 kg/m²   24HR INTAKE/OUTPUT:    Intake/Output Summary (Last 24 hours) at 3/23/2022 1714  Last data filed at 3/23/2022 1354  Gross per 24 hour   Intake 0 ml   Output 600 ml   Net -600 ml Physical Exam  Constitutional:       General: He is not in acute distress. Appearance: Normal appearance. He is ill-appearing. HENT:      Head: Normocephalic and atraumatic. Right Ear: External ear normal.      Left Ear: External ear normal.      Nose: Nose normal.      Mouth/Throat:      Mouth: Mucous membranes are moist.   Eyes:      Extraocular Movements: Extraocular movements intact. Conjunctiva/sclera: Conjunctivae normal.      Pupils: Pupils are equal, round, and reactive to light. Cardiovascular:      Rate and Rhythm: Normal rate and regular rhythm. Pulses: Normal pulses. Heart sounds: Normal heart sounds. Pulmonary:      Effort: Pulmonary effort is normal. No respiratory distress. Breath sounds: Normal breath sounds. No wheezing, rhonchi or rales. Abdominal:      General: Bowel sounds are normal. There is no distension. Palpations: Abdomen is soft. Tenderness: There is no abdominal tenderness. Genitourinary:     Comments: Lemos in place   Musculoskeletal:         General: No swelling, tenderness or deformity. Normal range of motion. Cervical back: Normal range of motion and neck supple. No muscular tenderness. Right lower leg: No edema. Left lower leg: No edema. Skin:     General: Skin is warm and dry. Findings: No bruising or lesion. Neurological:      Mental Status: He is alert and oriented to person, place, and time. Psychiatric:         Mood and Affect: Mood normal.         Behavior: Behavior normal.         Thought Content:  Thought content normal.            Medications:      lactated ringers 150 mL/hr at 03/23/22 1440    sodium chloride        ciprofloxacin  400 mg IntraVENous Once    pantoprazole (PROTONIX) 40 mg injection  40 mg IntraVENous Q12H    sodium chloride flush  5-40 mL IntraVENous 2 times per day    enoxaparin  40 mg SubCUTAneous Q24H    tamsulosin  0.4 mg Oral Daily     sodium chloride flush, sodium chloride, acetaminophen, ondansetron **OR** ondansetron  ADULT DIET; Clear Liquid     Lab and other Data:     Recent Labs     03/22/22 0728 03/23/22  0301   WBC 8.4 7.1   HGB 12.9* 10.7*    224     Recent Labs     03/22/22 0728 03/23/22 0302    136   K 4.5 4.7   CL 97* 99   CO2 23 23   BUN 18 20   CREATININE 0.9 0.8   GLUCOSE 127* 102     Recent Labs     03/22/22 0728 03/23/22  0302   AST 38 27   ALT 55* 38   BILITOT 0.6 0.6   ALKPHOS 112 92     INR:   Recent Labs     03/22/22 0728   INR 1.16       RAD:     CT ABDOMEN PELVIS W IV CONTRAST Additional Contrast? None  Result Date: 3/22/2022      1. Recent acute pancreatitis with areas of walled off necrosis surrounding the pancreas. This includes a fairly large area of walled off necrosis anterior to the pancreas, measuring up to 9.2 cm in greatest diameter. There is mass effect on the pancreas as well as the mid transverse colon, however, I do not see any significant inflammatory change along the colon or evidence of actual mechanical obstruction. The small bowel is nondilated. 2. Mesenteric vascular congestion with trace intraperitoneal free fluid which I believe is secondary to the mass effect on the SMV and proximal main portal vein. I do not see any portal vein thrombus. 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 3. Bilateral small layering pleural effusions. 4. Cholelithiasis. Signed by Dr Barcenas December    EGD    Result Date: 3/23/2022  No dictation     EUS    Result Date: 3/23/2022  No dictation         Micro:    Culture, Body Fluid [[id number]] Collected: 03/23/22 1059   Order Status: Sent Specimen:  Body Fluid from Pancreas Updated: 03/23/22 1515    Gram Stain Result No WBCs or organisms seen     COVID-19, Rapid [[id number]] Collected: 03/22/22 1020   Order Status: Completed Specimen: Nasopharyngeal Swab Updated: 03/22/22 1051    SARS-CoV-2, NAAT Not Detected         Assessment/Plan   Principal Problem:    Acute recurrent pancreatitis  Active Problems:    Urinary retention  Resolved Problems:    * No resolved hospital problems. *    Principal Problem:    Acute recurrent pancreatitis-   - GI following     - underwent EGD with findings of gastric lumen with appearance of extrinsic compression most notable in the distal stomach, suspicious for a functional gastric outlet obstruction from the extrinsic fluid collections seen on CT, duodenum appears significantly inflamed with mild-moderate duodenitis    - S/p FNA of pancreatic fluid collection and endoscopic placement of post pyloric feeding tube per GI    - GI recommended PPI BID, dietician for initiation of tube feeds, clear liquid diet for oral comfort   - Clear liquids per GI  - IVF  - Triglycerides 89  - Monitor calcium level, WNL   - As needed pain control            Active Problems:    Urinary retention-               - Continue indwelling Lemos               - Monitor I's and O's closely               - Continue home Flomax               - Urology follow up outpatient on Friday              DVT Prophylaxis: Lovenox      GI prophylaxis:  Protonix      Discharge planning: TBD       Further Orders per Clinical course/attending. Electronically signed by Malen Aschoff, APRN - CNP on 3/23/2022 at 5:14 PM       EMR Dragon/Transcription disclaimer:   Much of this encounter note is an electronic transcription/translation of spoken language to printed text.  The electronic translation of spoken language may permit erroneous, or at times, nonsensical words or phrases to be inadvertently transcribed; although attempts have made to review the note for such errors, some may still exist.

## 2022-03-23 NOTE — CARE COORDINATION
Initial CM Assessment    Initial Assessment Completed at bedside with:    [x]   Patient  [x]   Family/Caregiver/Guardian - spouse at bedside    []   Other:      Patient Contact Information:  [address] [place] [address]  [phone] (home)   Above information verified? [x]   Yes  []   No    ADLS:   Independent   Support System:     spouse   Plan to return to current housing:   [x]   Yes  []   No    Transportation plan for Discharge:  Spouse     Do you have any unmet social needs that would keep you from returning home safely:  []   Yes  [x]   No              Unmet Social Needs Notes:       Had [address] [facility] prior to admission:    []   Yes  [x]   No    Currently ACTIVE with Home Health CARE:    []   Yes  [x]   No  []   Interested at discharge  [facility]:      Current PCP:  Peyton Wang MD  PCP verified? [x]   Yes  []   No    Have you been vaccinated for COVID-19 (SARS-CoV-2)? [x]   Yes  []   No                   If so, when? Which :  []   [employer]  []   Moderna  []   Amie Oakes  []   Other:       Pharmacy:    [facility] [address] FADI JEFFRIES [address], [address] [phone] Maria Guadalupe Obrien [phone]  [address] [place] [address]  Phone: [phone] Fax: [phone]    Prefer to use Meds to Bed? []   Yes  [x]   No  Potential assistance purchasing medications?      []   Yes  [x]   No    Active with HD/PD prior to admission:           []   Yes  [x]   No  HD Center:       Financial:  Payor: Christopher Mason / Plan: MEDICARE PART A AND B / Product Type: *No Product type* /     Pre-Cert required for SNF:   []   Yes  [x]   No    Patient Deficits:  []   Yes   [x]   No    If yes:  []   Confusion/Memory  []   Visual  []   Motor/Sensory         []   Right arm         []   Right leg         []   Left arm         []   Left leg  []   Language/Speech         []   Aphasia         []   Dysarthria         []   Swallow         [place] Coma Scale  Eye Opening: Spontaneous  Best Verbal Response: Oriented  Best Motor Response: Obeys commands  [place] Coma Scale Score: 15    Patient Deficit Notes: Additional CM/SW Notes:    Spoke with pt and spouse at pt's bedside. They voiced concerns about communication of pt's diagnosis and plans/testing during admission. SW will continue to follow.      Emir Colvin and/or his family were provided with choice of provider:  [x]   Yes   []   No      Patient Admission Status:       [address]

## 2022-03-23 NOTE — ANESTHESIA POSTPROCEDURE EVALUATION
Department of Anesthesiology  Postprocedure Note    Patient: Jayde Veras  MRN: [id number]  [place]: 1950  Date of evaluation: 3/23/2022  Time:  12:52 PM     Procedure Summary     Date: 03/23/22 Room / Location: [address]    Anesthesia Start: 8129 Anesthesia Stop:     Procedures:       EGD ESOPHAGOGASTRODUODENOSCOPY (N/A )      ENDOSCOPIC ULTRASOUND w/FNA (N/A Abdomen) Diagnosis: (Nausea. Pancreatitis. Abnormal imaging study. Feeding difficulties.)    Surgeons: Lucia More MD Responsible Provider: RAY Modi CRNA    Anesthesia Type: general, TIVA ASA Status: 3          Anesthesia Type: No value filed. Lorenzo Phase I:      Lorenzo Phase II:      Last vitals: Reviewed and per EMR flowsheets.        Anesthesia Post Evaluation    Patient location during evaluation: bedside  Patient participation: complete - patient participated  Level of consciousness: sleepy but conscious  Pain score: 0  Airway patency: patent  Nausea & Vomiting: no nausea and no vomiting  Complications: no  Cardiovascular status: hemodynamically stable and blood pressure returned to baseline  Respiratory status: acceptable and nasal cannula  Hydration status: stable

## 2022-03-24 ENCOUNTER — APPOINTMENT (OUTPATIENT)
Dept: GENERAL RADIOLOGY | Age: 72
DRG: 438 | End: 2022-03-24
Payer: MEDICARE

## 2022-03-24 LAB
ALBUMIN SERPL-MCNC: 2.6 G/DL (ref 3.5–5.2)
ALP BLD-CCNC: 79 U/L (ref 40–130)
ALT SERPL-CCNC: 29 U/L (ref 5–41)
ANION GAP SERPL CALCULATED.3IONS-SCNC: 9 MMOL/L (ref 7–19)
AST SERPL-CCNC: 21 U/L (ref 5–40)
BASOPHILS ABSOLUTE: 0 K/UL (ref 0–0.2)
BASOPHILS RELATIVE PERCENT: 0.2 % (ref 0–1)
BILIRUB SERPL-MCNC: 0.5 MG/DL (ref 0.2–1.2)
BUN BLDV-MCNC: 15 MG/DL (ref 8–23)
CALCIUM SERPL-MCNC: 8.6 MG/DL (ref 8.8–10.2)
CHLORIDE BLD-SCNC: 102 MMOL/L (ref 98–111)
CO2: 25 MMOL/L (ref 22–29)
CREAT SERPL-MCNC: 0.7 MG/DL (ref 0.5–1.2)
EOSINOPHILS ABSOLUTE: 0.2 K/UL (ref 0–0.6)
EOSINOPHILS RELATIVE PERCENT: 4.8 % (ref 0–5)
GFR AFRICAN AMERICAN: >59
GFR NON-AFRICAN AMERICAN: >60
GLUCOSE BLD-MCNC: 112 MG/DL (ref 74–109)
HCT VFR BLD CALC: 31.8 % (ref 42–52)
HEMOGLOBIN: 9.8 G/DL (ref 14–18)
IMMATURE GRANULOCYTES #: 0 K/UL
LYMPHOCYTES ABSOLUTE: 1.1 K/UL (ref 1.1–4.5)
LYMPHOCYTES RELATIVE PERCENT: 22.4 % (ref 20–40)
MCH RBC QN AUTO: 29.5 PG (ref 27–31)
MCHC RBC AUTO-ENTMCNC: 30.8 G/DL (ref 33–37)
MCV RBC AUTO: 95.8 FL (ref 80–94)
MONOCYTES ABSOLUTE: 0.5 K/UL (ref 0–0.9)
MONOCYTES RELATIVE PERCENT: 9.3 % (ref 0–10)
NEUTROPHILS ABSOLUTE: 3.2 K/UL (ref 1.5–7.5)
NEUTROPHILS RELATIVE PERCENT: 62.9 % (ref 50–65)
PDW BLD-RTO: 13.8 % (ref 11.5–14.5)
PLATELET # BLD: 184 K/UL (ref 130–400)
PMV BLD AUTO: 10 FL (ref 9.4–12.4)
POTASSIUM REFLEX MAGNESIUM: 4.4 MMOL/L (ref 3.5–5)
RBC # BLD: 3.32 M/UL (ref 4.7–6.1)
SODIUM BLD-SCNC: 136 MMOL/L (ref 136–145)
TOTAL PROTEIN: 4.7 G/DL (ref 6.6–8.7)
WBC # BLD: 5.1 K/UL (ref 4.8–10.8)

## 2022-03-24 PROCEDURE — 36415 COLL VENOUS BLD VENIPUNCTURE: CPT

## 2022-03-24 PROCEDURE — 99233 SBSQ HOSP IP/OBS HIGH 50: CPT | Performed by: INTERNAL MEDICINE

## 2022-03-24 PROCEDURE — 2580000003 HC RX 258: Performed by: INTERNAL MEDICINE

## 2022-03-24 PROCEDURE — 1210000000 HC MED SURG R&B

## 2022-03-24 PROCEDURE — 85025 COMPLETE CBC W/AUTO DIFF WBC: CPT

## 2022-03-24 PROCEDURE — 6360000002 HC RX W HCPCS: Performed by: INTERNAL MEDICINE

## 2022-03-24 PROCEDURE — 6370000000 HC RX 637 (ALT 250 FOR IP): Performed by: INTERNAL MEDICINE

## 2022-03-24 PROCEDURE — 71045 X-RAY EXAM CHEST 1 VIEW: CPT

## 2022-03-24 PROCEDURE — C9113 INJ PANTOPRAZOLE SODIUM, VIA: HCPCS | Performed by: INTERNAL MEDICINE

## 2022-03-24 PROCEDURE — 80053 COMPREHEN METABOLIC PANEL: CPT

## 2022-03-24 RX ADMIN — SODIUM CHLORIDE, PRESERVATIVE FREE 40 MG: 5 INJECTION INTRAVENOUS at 05:48

## 2022-03-24 RX ADMIN — SODIUM CHLORIDE, POTASSIUM CHLORIDE, SODIUM LACTATE AND CALCIUM CHLORIDE: 600; 310; 30; 20 INJECTION, SOLUTION INTRAVENOUS at 20:02

## 2022-03-24 RX ADMIN — SODIUM CHLORIDE, POTASSIUM CHLORIDE, SODIUM LACTATE AND CALCIUM CHLORIDE: 600; 310; 30; 20 INJECTION, SOLUTION INTRAVENOUS at 02:55

## 2022-03-24 RX ADMIN — ENOXAPARIN SODIUM 40 MG: 100 INJECTION SUBCUTANEOUS at 11:21

## 2022-03-24 RX ADMIN — TAMSULOSIN HYDROCHLORIDE 0.4 MG: 0.4 CAPSULE ORAL at 08:54

## 2022-03-24 RX ADMIN — SODIUM CHLORIDE, PRESERVATIVE FREE 40 MG: 5 INJECTION INTRAVENOUS at 17:58

## 2022-03-24 ASSESSMENT — ENCOUNTER SYMPTOMS
ABDOMINAL PAIN: 0
COLOR CHANGE: 0
WHEEZING: 0
COUGH: 0
SHORTNESS OF BREATH: 0
BLOOD IN STOOL: 0
DIARRHEA: 0
VOMITING: 0
CONSTIPATION: 0
ABDOMINAL DISTENTION: 0
NAUSEA: 0

## 2022-03-24 ASSESSMENT — PAIN SCALES - GENERAL: PAINLEVEL_OUTOF10: 0

## 2022-03-24 NOTE — CONSULTS
Comprehensive Nutrition Assessment    Type and Reason for Visit:  Initial,Consult    Nutrition Recommendations/Plan:   Initiate EN per consult: Vital AF 1.2 @  25 ml/hr. Advance by 10 ml every 8 hours as tolerated until goal rate of 65 ml/hr achieved. Monitor s/sx of intolerance and pancreatitis. Bolus Regimen for d/c: 260 ml of Vital AF 1.2 six times/day. This matches nutritional content of continuous goal rate. Recommend work patient to goal rate of 65 ml/hr and monitor tolerance prior to change to bolus regimen. If IVF d/c'd initiate free water flush at 30 ml/hr via pump. Nutrition Assessment:  Consult for tube feeding recieved. Pt meets criteria for severe acute malnutrition AEB poor po intake X 5 days and significant wt loss X 1 month (6.2%, 13 lbs). Aware pt has post-pyloric DHT, elemental formula required. Will initiate EN per consult. Malnutrition Assessment:  Malnutrition Status:  Severe malnutrition    Context:  Acute Illness     Findings of the 6 clinical characteristics of malnutrition:  Energy Intake:  7 - 50% or less of estimated energy requirements for 5 or more days  Weight Loss:  7 - Greater than 5% over 1 month     Body Fat Loss:  Unable to assess     Muscle Mass Loss:  Unable to assess    Fluid Accumulation:  No significant fluid accumulation     Strength:  Not Performed    Estimated Daily Nutrient Needs:  Energy (kcal):  6643-4807 kcals/day; Weight Used for Energy Requirements:  Current (20-25 kcals/kg)     Protein (g):  101-118 g/pro/day; Weight Used for Protein Requirements:  Ideal (1.2-1.4 g/kg)        Fluid (ml/day):  7491-0785 mL/fluid/day; Method Used for Fluid Requirements:  1 ml/kcal      Nutrition Related Findings:  LR @ 150 ml/hr      Current Nutrition Therapies:    ADULT DIET; Clear Liquid  ADULT TUBE FEEDING; Nasoenteric; Peptide Based; Continuous; 25; Yes; 10; Q 8 hours; 65; 0; Other (specify); If IVF d/c'd initiate 30 ml/hr H2O flush via pump.   Current Tube Feeding (TF) Orders:  · Feeding Route: Nasoenteric (doudenum)  · Formula: Peptide Based  · Schedule: Continuous  · Water Flushes: 0 ml/hr d/t IVF (LR @ 150 ml/hr)  · Current TF & Flush Orders Provides: 0  · Goal TF & Flush Orders Provides: Vital AF @ 65 ml/hr = 1872 kcals, 117 g Protein, 173 g CHO, 84 g Fat, 1265 ml/fluid/day from formula. Anthropometric Measures:  · Height: 6' 1\" (185.4 cm)  · Current Body Weight: 197 lb (89.4 kg)   · Admission Body Weight: 197 lb (89.4 kg)    · Usual Body Weight: 210 lb (95.3 kg) (2/28/2022)     · Ideal Body Weight: 184 lbs  · BMI: 26   · BMI Categories: Overweight (BMI 25.0-29. 9)       Nutrition Diagnosis:   · Severe malnutrition,In context of acute illness or injury related to altered GI function,inadequate protein-energy intake as evidenced by poor intake prior to admission,weight loss greater than or equal to 5% in 1 month    Nutrition Interventions:   Food and/or Nutrient Delivery:  Continue Current Diet,Start Tube Feeding  Nutrition Education/Counseling:  Education not indicated   Coordination of Nutrition Care:  Continue to monitor while inpatient    Goals:  Pt will tolerate EN to meet nutritional needs. Nutrition Monitoring and Evaluation:   Food/Nutrient Intake Outcomes:  Diet Advancement/Tolerance,Food and Nutrient Intake,Enteral Nutrition Intake/Tolerance  Physical Signs/Symptoms Outcomes:  Biochemical Data,Nutrition Focused Physical Findings,Weight,GI Status     Discharge Planning:     Too soon to determine     Electronically signed by Sun Sigala, MS, RD, LD on 3/24/22 at 9:16 AM CDT    Contact: [phone]

## 2022-03-24 NOTE — CARE COORDINATION
Bolus feeds regimen sent to [address] [place] [address] Dr Araujo.    [facility]  [phone] P   [phone] main fax for [facility] weekend coverage  Electronically signed by Maia Hernandez on 3/24/2022 at 2:47 PM

## 2022-03-24 NOTE — PROGRESS NOTES
[facility]      Progress Note    Patient:  Ivet Castro  YOB: 1950  Date of Service: 3/24/2022  MRN: [id number]   Acct: [de-identified]   Primary Care Physician: Delma Caceres MD  Advance Directive: Full Code  Admit Date: 3/22/2022       Hospital Day: 2    Portions of this note have been copied forward, however, updated to reflect the most current clinical status of this patient. CHIEF COMPLAINT Abdominal pain    SUBJECTIVE:  Mr. Leslie Bess was resting in bed this morning. Denied fever, chills, nausea, or vomiting. Denies indigestion. CUMULATIVE HOSPITAL COURSE:   The patient is a 73 y.o. male with past medical history of Metastatic renal carcinoma, and recent pancreatitis who presented to [facility] ED complaining of abdominal pain and eating well in the past few days. Mr. Leslie Bess reported he initially presented to ED for Lemos catheter removal from having urinary retention recently. Presented to ED on 3/18/2022 with complaints of urinary retention, and indwelling Lemos catheter was inserted at that time with recommendations of outpatient follow-up with urology or return to ED for Lemos removal. Mr. Leslie Bess reported having pancreatitis on Feb 28, 2022 and being hospitalized at that time. Stated his pancreatitis got somewhat better initially. Reported nausea and early satiety. Denied fever, chills, shortness of breath, chest pain, vomiting, or diarrhea. Work-up in ED revealed ALT 55, lipase 215, Hgb 12.9, CT abdomen showed recent acute pancreatitis with area of walled off necrosis surrounding the pancreas, mesenteric vascular congestion with trace intraperitoneal free fluid which is secondary to mass-effect on the SMV and proximal main portal vein, cholelithiasis. Patient was admitted to hospital medicine with acute pancreatitis with GI consultation.   GI recommended supportive treatment with clear liquid diet, IV rehydration, upper GI endoscopy with possible endoscopic ultrasound and cyst drainage if indicated. Patient underwent EGD with findings of gastric lumen with appearance of extrinsic compression most notable in the distal stomach, suspicious for a functional gastric outlet obstruction from the extrinsic fluid collections seen on CT, duodenum appears significantly inflamed with mild-moderate duodenitis. S/p FNA of pancreatic fluid collection and endoscopic placement of post pyloric feeding tube per GI. GI recommended PPI BID, dietician for initiation of tube feeds, clear liquid diet for oral comfort. Dietician recommended Vital AF 1.2 @ 15 ml/hr, advance by 10 mL every 2 hours as tolerated with goal rate of 65 ml/hr. Review of Systems   Constitutional: Negative for chills, diaphoresis, fatigue and fever. HENT: Negative for congestion and ear pain. Eyes: Negative for visual disturbance. Respiratory: Negative for cough, shortness of breath and wheezing. Cardiovascular: Negative for chest pain, palpitations and leg swelling. Gastrointestinal: Negative for abdominal distention, abdominal pain, blood in stool, constipation, diarrhea, nausea and vomiting. Denies N/V or indigestion    Endocrine: Negative for cold intolerance and heat intolerance. Genitourinary: Negative for difficulty urinating, flank pain, frequency and urgency. Musculoskeletal: Negative for arthralgias and myalgias. Skin: Negative for color change and wound. Neurological: Negative for dizziness, syncope, weakness, light-headedness, numbness and headaches. Hematological: Does not bruise/bleed easily. Psychiatric/Behavioral: Negative for agitation, confusion and dysphoric mood.         Objective:   VITALS:  BP (!) 131/58   Pulse 99   Temp 97.7 °F (36.5 °C)   Resp 20   Ht 6' 1\" (1.854 m)   Wt 197 lb (89.4 kg)   SpO2 91%   BMI 25.99 kg/m²   24HR INTAKE/OUTPUT:      Intake/Output Summary (Last 24 hours) at 3/24/2022 1156  Last data filed at 3/24/2022 0956  Gross per 24 hour   Intake 1440 ml   Output 2500 ml   Net -1060 ml       Physical Exam  Constitutional:       General: He is not in acute distress. Appearance: Normal appearance. He is ill-appearing. HENT:      Head: Normocephalic and atraumatic. Right Ear: External ear normal.      Left Ear: External ear normal.      Nose: Nose normal.      Mouth/Throat:      Mouth: Mucous membranes are moist.   Eyes:      Extraocular Movements: Extraocular movements intact. Conjunctiva/sclera: Conjunctivae normal.      Pupils: Pupils are equal, round, and reactive to light. Cardiovascular:      Rate and Rhythm: Normal rate and regular rhythm. Pulses: Normal pulses. Heart sounds: Normal heart sounds. Pulmonary:      Effort: Pulmonary effort is normal. No respiratory distress. Breath sounds: Normal breath sounds. No wheezing, rhonchi or rales. Abdominal:      General: Bowel sounds are normal. There is no distension. Palpations: Abdomen is soft. Tenderness: There is no abdominal tenderness. Comments: NG tube in place   Genitourinary:     Comments: Lemos in place   Musculoskeletal:         General: No swelling, tenderness or deformity. Normal range of motion. Cervical back: Normal range of motion and neck supple. No muscular tenderness. Right lower leg: No edema. Left lower leg: No edema. Skin:     General: Skin is warm and dry. Findings: No bruising or lesion. Neurological:      Mental Status: He is alert and oriented to person, place, and time. Psychiatric:         Mood and Affect: Mood normal.         Behavior: Behavior normal.         Thought Content:  Thought content normal.            Medications:      lactated ringers 150 mL/hr at 03/24/22 0255    sodium chloride        pantoprazole (PROTONIX) 40 mg injection  40 mg IntraVENous Q12H    sodium chloride flush  5-40 mL IntraVENous 2 times per day    enoxaparin  40 mg SubCUTAneous Q24H    tamsulosin  0.4 mg Oral Daily sodium chloride flush, sodium chloride, acetaminophen, ondansetron **OR** ondansetron  ADULT DIET; Clear Liquid  ADULT TUBE FEEDING; Nasoenteric; Peptide Based; Continuous; 15; Yes; 10; Q 12 hours; 65; 0; Other (specify); If IVF d/c'd initiate 30 ml/hr H2O flush via pump. Lab and other Data:     Recent Labs     03/22/22 0728 03/23/22 0301 03/24/22  0433   WBC 8.4 7.1 5.1   HGB 12.9* 10.7* 9.8*    224 184     Recent Labs     03/22/22 0728 03/23/22 0302 03/24/22  0433    136 136   K 4.5 4.7 4.4   CL 97* 99 102   CO2 23 23 25   BUN 18 20 15   CREATININE 0.9 0.8 0.7   GLUCOSE 127* 102 112*     Recent Labs     03/22/22 0728 03/23/22 0302 03/24/22  0433   AST 38 27 21   ALT 55* 38 29   BILITOT 0.6 0.6 0.5   ALKPHOS 112 92 79     INR:   Recent Labs     03/22/22 0728   INR 1.16       RAD:     CT ABDOMEN PELVIS W IV CONTRAST Additional Contrast? None  Result Date: 3/22/2022      1. Recent acute pancreatitis with areas of walled off necrosis surrounding the pancreas. This includes a fairly large area of walled off necrosis anterior to the pancreas, measuring up to 9.2 cm in greatest diameter. There is mass effect on the pancreas as well as the mid transverse colon, however, I do not see any significant inflammatory change along the colon or evidence of actual mechanical obstruction. The small bowel is nondilated. 2. Mesenteric vascular congestion with trace intraperitoneal free fluid which I believe is secondary to the mass effect on the SMV and proximal main portal vein. I do not see any portal vein thrombus. 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 3. Bilateral small layering pleural effusions. 4. Cholelithiasis. Signed by Dr Barcenas December    EGD    Result Date: 3/23/2022  No dictation     EUS    Result Date: 3/23/2022  No dictation         Micro:    Culture, Body Fluid [[id number]] Collected: 03/23/22 1050   Order Status: Sent Specimen:  Body Fluid from Pancreas Updated: 03/23/22 1516    Gram Stain Result No WBCs or organisms seen     COVID-19, Rapid [[id number]] Collected: 03/22/22 1020   Order Status: Completed Specimen: Nasopharyngeal Swab Updated: 03/22/22 1051    SARS-CoV-2, NAAT Not Detected         Assessment/Plan   Principal Problem:    Acute recurrent pancreatitis  Active Problems:    Urinary retention    Severe malnutrition ([address]  Resolved Problems:    * No resolved hospital problems. *    Principal Problem:    Acute recurrent pancreatitis-   - GI following     - underwent EGD with findings of gastric lumen with appearance of extrinsic compression most notable in the distal stomach, suspicious for a functional gastric outlet obstruction from the extrinsic fluid collections seen on CT, duodenum appears significantly inflamed with mild-moderate duodenitis    - S/p FNA of pancreatic fluid collection and endoscopic placement of post pyloric feeding tube per GI    - GI recommended PPI BID, dietician for initiation of tube feeds, clear liquid diet for oral comfort     -  Dietician recommended Vital AF 1.2 @ 15 ml/hr, advance by 10 mL every 2 hours as tolerated with goal rate of 65 ml/hr   - Clear liquids per GI  - IVF  - Triglycerides 89  - Monitor calcium level, WNL   - As needed pain control            Active Problems:    Urinary retention-               - Continue indwelling Lemos               - Monitor I's and O's closely               - Continue home Flomax               - Urology follow up outpatient on Friday, may need to change?              DVT Prophylaxis: Lovenox      GI prophylaxis:  Protonix      Discharge planning: TBD       Further Orders per Clinical course/attending. Electronically signed by RAY Sigala CNP on 3/24/2022 at 11:56 AM       EMR Dragon/Transcription disclaimer:   Much of this encounter note is an electronic transcription/translation of spoken language to printed text.  The electronic translation of spoken language may permit erroneous, or at times, nonsensical words or phrases to be inadvertently transcribed; although attempts have made to review the note for such errors, some may still exist.

## 2022-03-24 NOTE — PROGRESS NOTES
GI  - PROGRESS NOTE    Subjective:   Admit Date: 3/22/2022  PCP: Rodrigo Barnett MD    Patient being seen for: Pancreatitis. Abnormal imaging study. HPI: Patient is feeling fine. No pain after his endoscopy and EUS. He is feeling much better now. He is tolerating some diet. His nasal jejunostomy tube is in place. Feeding has not been started yet. No significant fever chills. No chest pain or palpitation. Medications:  Scheduled Meds:   pantoprazole (PROTONIX) 40 mg injection  40 mg IntraVENous Q12H    sodium chloride flush  5-40 mL IntraVENous 2 times per day    enoxaparin  40 mg SubCUTAneous Q24H    tamsulosin  0.4 mg Oral Daily       Continuous Infusions:   lactated ringers 150 mL/hr at 03/24/22 0255    sodium chloride         PRN Meds:.sodium chloride flush, sodium chloride, acetaminophen, ondansetron **OR** ondansetron      Labs:     Recent Labs     03/22/22  0728 03/23/22 0301 03/24/22 0433   WBC 8.4 7.1 5.1   RBC 4.35* 3.59* 3.32*   HGB 12.9* 10.7* 9.8*   HCT 40.6* 34.0* 31.8*   MCV 93.3 94.7* 95.8*   MCH 29.7 29.8 29.5   MCHC 31.8* 31.5* 30.8*    224 184     Recent Labs     03/22/22  0728 03/23/22 0302 03/24/22  0433    136 136   K 4.5 4.7 4.4   ANIONGAP 16 14 9   CL 97* 99 102   CO2 23 23 25   BUN 18 20 15   CREATININE 0.9 0.8 0.7   GLUCOSE 127* 102 112*   CALCIUM 9.9 9.9 8.6*     No results for input(s): MG, PHOS in the last 72 hours. Recent Labs     03/22/22  0728 03/23/22  0302 03/24/22  0433   AST 38 27 21   ALT 55* 38 29   BILITOT 0.6 0.6 0.5   ALKPHOS 112 92 79     HgBA1c:  No components found for: HGBA1C  FLP:    Lab Results   Component Value Date    TRIG 89 03/23/2022    HDL 37 03/12/2021    LDLCALC 152 03/12/2021     TSH:    Lab Results   Component Value Date    TSH 0.607 03/05/2020     Troponin T: No results for input(s): TROPONINI in the last 72 hours.   INR:   Recent Labs     03/22/22  0728   INR 1.16 Recent Labs     03/22/22  0728 03/23/22  0302   LIPASE 215* 291*     -----------------------------------------------------------------  RAD:     EGD    Result Date: 3/23/2022  No dictation     EUS    Result Date: 3/23/2022  No dictation           Physical Exam:     BP (!) 131/58   Pulse 99   Temp 97.7 °F (36.5 °C)   Resp 20   Ht 6' 1\" (1.854 m)   Wt 197 lb (89.4 kg)   SpO2 91%   BMI 25.99 kg/m²     General appearance: alert and cooperative with exam, appears stated age, no acute distress   Head: normal cephalic, atraumatic. EOMI bilaterally, no neck lymphadenopathy appreciated, no carotid bruits noted  Lungs: Clear to percussion and auscultation. No wheezing or rales. Heart: S1 S2 are audible. No added sound. Abdomen: Soft, non distended and non tender. No hepatosplenomegaly. Bowel sounds are audible. No masses palpable. Skin: warm, dry, no obvious rash, non-jaundice  Extremities: No cyanosis or clubbing or peripheral edema noted. Dorsalis pedis pulses were intact. Asssessment:   Patient admitted to the hospital with feeling of fullness, nausea and unable to eat. Endoscopic ultrasound revealed extrinsic pressure secondary to his pancreatic cyst.  Status post drainage partially. A nasojejunal CBD was placed. He denies any acute GI symptoms. Impression:   1. Pancreatitis with the wall of necrosis. Status post EUS with some drainage. 2.  Feeding difficulties, status post nasojejunal ostomy tube placement. 3.  Nausea. Plan:   1. Endoscopy findings were discussed. 2.  Patient can be started on tube feedings. He was advised that he still can take some soft mechanical diet or liquid diet orally as needed. 3.  If patient clinically stable he can be discharged home with tube feedings preferable to be bolus feedings. 4.  Follow-up with Dr. Denver Quivers in 3 to 4 weeks time as an outpatient.     Ewa Bautista MD  3/24/2022, 12:44 PM

## 2022-03-24 NOTE — PLAN OF CARE
Nutrition Problem #1: Severe malnutrition,In context of acute illness or injury  Intervention: Food and/or Nutrient Delivery: Continue Current Diet,Start Tube Feeding  Nutritional Goals: Pt will tolerate EN to meet nutritional needs.     Problem: Nutrition  Goal: Optimal nutrition therapy  Outcome: Ongoing

## 2022-03-25 ENCOUNTER — APPOINTMENT (OUTPATIENT)
Dept: GENERAL RADIOLOGY | Age: 72
DRG: 438 | End: 2022-03-25
Payer: MEDICARE

## 2022-03-25 ENCOUNTER — TELEPHONE (OUTPATIENT)
Dept: UROLOGY | Age: 72
End: 2022-03-25

## 2022-03-25 LAB
ALBUMIN SERPL-MCNC: 2.9 G/DL (ref 3.5–5.2)
ALP BLD-CCNC: 81 U/L (ref 40–130)
ALT SERPL-CCNC: 27 U/L (ref 5–41)
ANION GAP SERPL CALCULATED.3IONS-SCNC: 11 MMOL/L (ref 7–19)
AST SERPL-CCNC: 20 U/L (ref 5–40)
BASOPHILS ABSOLUTE: 0 K/UL (ref 0–0.2)
BASOPHILS RELATIVE PERCENT: 0.4 % (ref 0–1)
BILIRUB SERPL-MCNC: 0.4 MG/DL (ref 0.2–1.2)
BUN BLDV-MCNC: 9 MG/DL (ref 8–23)
CALCIUM SERPL-MCNC: 8.6 MG/DL (ref 8.8–10.2)
CHLORIDE BLD-SCNC: 104 MMOL/L (ref 98–111)
CO2: 25 MMOL/L (ref 22–29)
CREAT SERPL-MCNC: 0.6 MG/DL (ref 0.5–1.2)
EOSINOPHILS ABSOLUTE: 0.2 K/UL (ref 0–0.6)
EOSINOPHILS RELATIVE PERCENT: 4.3 % (ref 0–5)
GFR AFRICAN AMERICAN: >59
GFR NON-AFRICAN AMERICAN: >60
GLUCOSE BLD-MCNC: 134 MG/DL (ref 74–109)
HCT VFR BLD CALC: 31.6 % (ref 42–52)
HEMOGLOBIN: 9.9 G/DL (ref 14–18)
IMMATURE GRANULOCYTES #: 0 K/UL
LYMPHOCYTES ABSOLUTE: 1.2 K/UL (ref 1.1–4.5)
LYMPHOCYTES RELATIVE PERCENT: 24.8 % (ref 20–40)
MCH RBC QN AUTO: 29.6 PG (ref 27–31)
MCHC RBC AUTO-ENTMCNC: 31.3 G/DL (ref 33–37)
MCV RBC AUTO: 94.6 FL (ref 80–94)
MONOCYTES ABSOLUTE: 0.4 K/UL (ref 0–0.9)
MONOCYTES RELATIVE PERCENT: 8.2 % (ref 0–10)
NEUTROPHILS ABSOLUTE: 3 K/UL (ref 1.5–7.5)
NEUTROPHILS RELATIVE PERCENT: 61.9 % (ref 50–65)
PDW BLD-RTO: 13.6 % (ref 11.5–14.5)
PLATELET # BLD: 187 K/UL (ref 130–400)
PMV BLD AUTO: 10.5 FL (ref 9.4–12.4)
POTASSIUM REFLEX MAGNESIUM: 4 MMOL/L (ref 3.5–5)
RBC # BLD: 3.34 M/UL (ref 4.7–6.1)
SODIUM BLD-SCNC: 140 MMOL/L (ref 136–145)
TOTAL PROTEIN: 5 G/DL (ref 6.6–8.7)
WBC # BLD: 4.9 K/UL (ref 4.8–10.8)

## 2022-03-25 PROCEDURE — 6360000002 HC RX W HCPCS: Performed by: INTERNAL MEDICINE

## 2022-03-25 PROCEDURE — C9113 INJ PANTOPRAZOLE SODIUM, VIA: HCPCS | Performed by: INTERNAL MEDICINE

## 2022-03-25 PROCEDURE — A4216 STERILE WATER/SALINE, 10 ML: HCPCS | Performed by: INTERNAL MEDICINE

## 2022-03-25 PROCEDURE — 85025 COMPLETE CBC W/AUTO DIFF WBC: CPT

## 2022-03-25 PROCEDURE — 2580000003 HC RX 258: Performed by: INTERNAL MEDICINE

## 2022-03-25 PROCEDURE — 71045 X-RAY EXAM CHEST 1 VIEW: CPT

## 2022-03-25 PROCEDURE — 1210000000 HC MED SURG R&B

## 2022-03-25 PROCEDURE — 36415 COLL VENOUS BLD VENIPUNCTURE: CPT

## 2022-03-25 PROCEDURE — 80053 COMPREHEN METABOLIC PANEL: CPT

## 2022-03-25 PROCEDURE — 99233 SBSQ HOSP IP/OBS HIGH 50: CPT | Performed by: INTERNAL MEDICINE

## 2022-03-25 RX ADMIN — Medication 10 ML: at 21:00

## 2022-03-25 RX ADMIN — SODIUM CHLORIDE, PRESERVATIVE FREE 40 MG: 5 INJECTION INTRAVENOUS at 17:56

## 2022-03-25 RX ADMIN — ENOXAPARIN SODIUM 40 MG: 100 INJECTION SUBCUTANEOUS at 14:26

## 2022-03-25 RX ADMIN — SODIUM CHLORIDE, POTASSIUM CHLORIDE, SODIUM LACTATE AND CALCIUM CHLORIDE: 600; 310; 30; 20 INJECTION, SOLUTION INTRAVENOUS at 02:56

## 2022-03-25 RX ADMIN — SODIUM CHLORIDE, PRESERVATIVE FREE 40 MG: 5 INJECTION INTRAVENOUS at 05:41

## 2022-03-25 ASSESSMENT — ENCOUNTER SYMPTOMS
NAUSEA: 0
DIARRHEA: 0
VOMITING: 0
CONSTIPATION: 0
COLOR CHANGE: 0
SHORTNESS OF BREATH: 0
ABDOMINAL PAIN: 0
BLOOD IN STOOL: 0
COUGH: 0
WHEEZING: 0
ABDOMINAL DISTENTION: 0

## 2022-03-25 NOTE — PROGRESS NOTES
[facility]      Progress Note    Patient:  Eliel Smith  YOB: 1950  Date of Service: 3/25/2022  MRN: [id number]   Acct: [de-identified]   Primary Care Physician: Jeffrey Mayorga MD  Advance Directive: Full Code  Admit Date: 3/22/2022       Hospital Day: 3    Portions of this note have been copied forward, however, updated to reflect the most current clinical status of this patient. CHIEF COMPLAINT Abdominal pain    SUBJECTIVE:   [facility] was resting comfortably in bed this morning. Denies nausea or vomiting. Tube feeding have not been started, GI adjusted NJ this morning. CUMULATIVE HOSPITAL COURSE:   The patient is a 73 y.o. male with past medical history of Metastatic renal carcinoma, and recent pancreatitis who presented to [facility] ED complaining of abdominal pain and eating well in the past few days.  [facility] reported he initially presented to ED for Lemos catheter removal from having urinary retention recently. Presented to ED on 3/18/2022 with complaints of urinary retention, and indwelling Lemos catheter was inserted at that time with recommendations of outpatient follow-up with urology or return to ED for Lemos removal.  [facility] reported having pancreatitis on Feb 28, 2022 and being hospitalized at that time. Stated his pancreatitis got somewhat better initially. Reported nausea and early satiety. Denied fever, chills, shortness of breath, chest pain, vomiting, or diarrhea. Work-up in ED revealed ALT 55, lipase 215, Hgb 12.9, CT abdomen showed recent acute pancreatitis with area of walled off necrosis surrounding the pancreas, mesenteric vascular congestion with trace intraperitoneal free fluid which is secondary to mass-effect on the SMV and proximal main portal vein, cholelithiasis. Patient was admitted to hospital medicine with acute pancreatitis with GI consultation.   GI recommended supportive treatment with clear liquid diet, IV rehydration, upper GI endoscopy with possible endoscopic ultrasound and cyst drainage if indicated. Patient underwent EGD with findings of gastric lumen with appearance of extrinsic compression most notable in the distal stomach, suspicious for a functional gastric outlet obstruction from the extrinsic fluid collections seen on CT, duodenum appears significantly inflamed with mild-moderate duodenitis. S/p FNA of pancreatic fluid collection and endoscopic placement of post pyloric feeding tube per GI. GI recommended PPI BID, dietician for initiation of tube feeds, clear liquid diet for oral comfort. Dietician recommended Vital AF 1.2 @ 25 ml/hr, advance by 10 mL every 8 hours as tolerated with goal rate of 65 ml/hr and monitor for tolerance prior to change to bolus regimen as GI recommended. Review of Systems   Constitutional: Negative for chills, diaphoresis, fatigue and fever. HENT: Negative for congestion and ear pain. Eyes: Negative for visual disturbance. Respiratory: Negative for cough, shortness of breath and wheezing. Cardiovascular: Negative for chest pain, palpitations and leg swelling. Gastrointestinal: Negative for abdominal distention, abdominal pain, blood in stool, constipation, diarrhea, nausea and vomiting. Denies N/V or indigestion    Endocrine: Negative for cold intolerance and heat intolerance. Genitourinary: Negative for difficulty urinating, flank pain, frequency and urgency. Musculoskeletal: Negative for arthralgias and myalgias. Skin: Negative for color change and wound. Neurological: Negative for dizziness, syncope, weakness, light-headedness, numbness and headaches. Hematological: Does not bruise/bleed easily. Psychiatric/Behavioral: Negative for agitation, confusion and dysphoric mood.         Objective:   VITALS:  BP (!) 140/69   Pulse 93   Temp 98.1 °F (36.7 °C) (Temporal)   Resp 18   Ht 6' 1\" (1.854 m)   Wt 197 lb (89.4 kg)   SpO2 94%   BMI 25.99 kg/m²   24HR INTAKE/OUTPUT:      Intake/Output Summary (Last 24 hours) at 3/25/2022 1446  Last data filed at 3/25/2022 7973  Gross per 24 hour   Intake --   Output 2650 ml   Net -2650 ml       Physical Exam  Constitutional:       General: He is not in acute distress. Appearance: Normal appearance. He is not ill-appearing. HENT:      Head: Normocephalic and atraumatic. Right Ear: External ear normal.      Left Ear: External ear normal.      Nose: Nose normal.      Mouth/Throat:      Mouth: Mucous membranes are moist.   Eyes:      Extraocular Movements: Extraocular movements intact. Conjunctiva/sclera: Conjunctivae normal.      Pupils: Pupils are equal, round, and reactive to light. Cardiovascular:      Rate and Rhythm: Normal rate and regular rhythm. Pulses: Normal pulses. Heart sounds: Normal heart sounds. Pulmonary:      Effort: Pulmonary effort is normal. No respiratory distress. Breath sounds: Normal breath sounds. No wheezing, rhonchi or rales. Abdominal:      General: Bowel sounds are normal. There is no distension. Palpations: Abdomen is soft. Tenderness: There is no abdominal tenderness. Comments: NJ tube in place   Genitourinary:     Comments: Lemos in place   Musculoskeletal:         General: No swelling, tenderness or deformity. Normal range of motion. Cervical back: Normal range of motion and neck supple. No muscular tenderness. Right lower leg: No edema. Left lower leg: No edema. Skin:     General: Skin is warm and dry. Findings: No bruising or lesion. Neurological:      Mental Status: He is alert and oriented to person, place, and time. Psychiatric:         Mood and Affect: Mood normal.         Behavior: Behavior normal.         Thought Content:  Thought content normal.            Medications:      lactated ringers 150 mL/hr at 03/25/22 0256    sodium chloride        pantoprazole (PROTONIX) 40 mg injection  40 mg IntraVENous Q12H    sodium chloride flush  5-40 mL IntraVENous 2 times per day    enoxaparin  40 mg SubCUTAneous Q24H    tamsulosin  0.4 mg Oral Daily     sodium chloride flush, sodium chloride, acetaminophen, ondansetron **OR** ondansetron  ADULT DIET; Clear Liquid  ADULT TUBE FEEDING; Nasoenteric; Peptide Based; Continuous; 25; Yes; 10; Q 8 hours; 65; 0; Other (specify); If IVF d/c'd initiate 30 ml/hr H2O flush via pump. Lab and other Data:     Recent Labs     03/23/22 0301 03/24/22 0433 03/25/22  0351   WBC 7.1 5.1 4.9   HGB 10.7* 9.8* 9.9*    184 187     Recent Labs     03/23/22 0302 03/24/22 0433 03/25/22  0351    136 140   K 4.7 4.4 4.0   CL 99 102 104   CO2 23 25 25   BUN 20 15 9   CREATININE 0.8 0.7 0.6   GLUCOSE 102 112* 134*     Recent Labs     03/23/22 0302 03/24/22 0433 03/25/22  0351   AST 27 21 20   ALT 38 29 27   BILITOT 0.6 0.5 0.4   ALKPHOS 92 79 81       RAD:     CT ABDOMEN PELVIS W IV CONTRAST Additional Contrast? None  Result Date: 3/22/2022      1. Recent acute pancreatitis with areas of walled off necrosis surrounding the pancreas. This includes a fairly large area of walled off necrosis anterior to the pancreas, measuring up to 9.2 cm in greatest diameter. There is mass effect on the pancreas as well as the mid transverse colon, however, I do not see any significant inflammatory change along the colon or evidence of actual mechanical obstruction. The small bowel is nondilated. 2. Mesenteric vascular congestion with trace intraperitoneal free fluid which I believe is secondary to the mass effect on the SMV and proximal main portal vein. I do not see any portal vein thrombus. 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 3. Bilateral small layering pleural effusions. 4. Cholelithiasis.  Signed by Dr Cleve Ortiz    EGD    Result Date: 3/23/2022  No dictation     EUS    Result Date: 3/23/2022  No dictation         Micro:    Culture, Body Fluid [[id number]] Collected: 03/23/22 1059   Order Status: Sent Specimen: Body Fluid from Pancreas Updated: 03/23/22 1515    Gram Stain Result No WBCs or organisms seen     COVID-19, Rapid [[id number]] Collected: 03/22/22 1020   Order Status: Completed Specimen: Nasopharyngeal Swab Updated: 03/22/22 1051    SARS-CoV-2, NAAT Not Detected         Assessment/Plan   Principal Problem:    Acute recurrent pancreatitis  Active Problems:    Urinary retention    Severe malnutrition ([address]  Resolved Problems:    * No resolved hospital problems. 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 compression-   - GI following     - underwent EGD with findings of gastric lumen with appearance of extrinsic compression most notable in the distal stomach, suspicious for a functional gastric outlet obstruction from the extrinsic fluid collections seen on CT, duodenum appears significantly inflamed with mild-moderate duodenitis    - S/p FNA of pancreatic fluid collection and endoscopic placement of post pyloric feeding tube per GI    - GI recommended PPI BID, dietician for initiation of tube feeds, clear liquid diet for oral comfort     -  Dietician recommended Vital AF 1.2 @ 25 ml/hr, advance by 10 mL every 8 hours as tolerated with goal rate of 65 ml/hr, monitor for tolerance prior to change to bolus regimen as GI recommended    - Clear liquids for comfort per GI  - Triglycerides 89  - Calcium level, WNL   - As needed pain control             Active Problems:    Urinary retention-               - Continue indwelling Lemos               - Monitor I's and O's closely               - Continue home Flomax               - Urology follow up outpatient on Tuesday          DVT Prophylaxis: Lovenox      GI prophylaxis:  Protonix      Discharge planning: TBD       Further Orders per Clinical course/attending.      Electronically signed by RAY Aldrich CNP on 3/25/2022 at 2:46 PM       EMR Dragon/Transcription disclaimer:   Much of this encounter note is an electronic transcription/translation of spoken language to printed text.  The electronic translation of spoken language may permit erroneous, or at times, nonsensical words or phrases to be inadvertently transcribed; although attempts have made to review the note for such errors, some may still exist.

## 2022-03-25 NOTE — TELEPHONE ENCOUNTER
Ernie Sutton called to schedule a HFU for cath removal.     Please be advised that the best time to call him to accommodate their needs is Anytime. Thank you.

## 2022-03-25 NOTE — PROGRESS NOTES
GI  - PROGRESS NOTE    Subjective:   Admit Date: 3/22/2022  PCP: Jesusita Kapadia MD    Patient being seen for: Pancreatitis. HPI: Patient is feeling better. No significant pain. No nausea and vomiting. Apparently last night he is nasal jejunostomy tube could not be flushed. He was tolerating some liquid diet yesterday. No fever chills. Medications:  Scheduled Meds:   pantoprazole (PROTONIX) 40 mg injection  40 mg IntraVENous Q12H    sodium chloride flush  5-40 mL IntraVENous 2 times per day    enoxaparin  40 mg SubCUTAneous Q24H    tamsulosin  0.4 mg Oral Daily       Continuous Infusions:   lactated ringers 150 mL/hr at 03/25/22 0256    sodium chloride         PRN Meds:.sodium chloride flush, sodium chloride, acetaminophen, ondansetron **OR** ondansetron      Labs:     Recent Labs     03/23/22 0301 03/24/22 0433 03/25/22  0351   WBC 7.1 5.1 4.9   RBC 3.59* 3.32* 3.34*   HGB 10.7* 9.8* 9.9*   HCT 34.0* 31.8* 31.6*   MCV 94.7* 95.8* 94.6*   MCH 29.8 29.5 29.6   MCHC 31.5* 30.8* 31.3*    184 187     Recent Labs     03/23/22 0302 03/24/22 0433 03/25/22  0351    136 140   K 4.7 4.4 4.0   ANIONGAP 14 9 11   CL 99 102 104   CO2 23 25 25   BUN 20 15 9   CREATININE 0.8 0.7 0.6   GLUCOSE 102 112* 134*   CALCIUM 9.9 8.6* 8.6*     No results for input(s): MG, PHOS in the last 72 hours. Recent Labs     03/23/22 0302 03/24/22  0433 03/25/22  0351   AST 27 21 20   ALT 38 29 27   BILITOT 0.6 0.5 0.4   ALKPHOS 92 79 81     HgBA1c:  No components found for: HGBA1C  FLP:    Lab Results   Component Value Date    TRIG 89 03/23/2022    HDL 37 03/12/2021    LDLCALC 152 03/12/2021     TSH:    Lab Results   Component Value Date    TSH 0.607 03/05/2020     Troponin T: No results for input(s): TROPONINI in the last 72 hours. INR: No results for input(s): INR in the last 72 hours.     Recent Labs     03/23/22  0302   LIPASE 291* -----------------------------------------------------------------  RAD:     XR CHEST PORTABLE    Result Date: 3/24/2022  EXAM: XR CHEST PORTABLE INDICATION: Feeding tube placement COMPARISON: 3/22/2022 FINDINGS: Weighted feeding tube tip is in the distal stomach in the peripyloric region. Redemonstrated bowel distention. Mild atelectasis in the lung bases. Cardiac silhouette is normal size. No mass effect or suspicious calcifications. No acute osseous finding. Weighted feeding tube tip is in the distal stomach near the pylorus. Signed by Dr Niya Martinez          Physical Exam:     BP (!) 141/72   Pulse 97   Temp 98.4 °F (36.9 °C)   Resp 20   Ht 6' 1\" (1.854 m)   Wt 197 lb (89.4 kg)   SpO2 95%   BMI 25.99 kg/m²     General appearance: alert and cooperative with exam, appears stated age, no acute distress   Head: normal cephalic, atraumatic. EOMI bilaterally, no neck lymphadenopathy appreciated, no carotid bruits noted  Lungs: Clear to percussion and auscultation. No wheezing or rales. Heart: S1 S2 are audible. No added sound. Abdomen: Soft, non distended and non tender. No hepatosplenomegaly. Bowel sounds are audible. No masses palpable. Skin: warm, dry, no obvious rash, non-jaundice  Extremities: No cyanosis or clubbing or peripheral edema noted. Dorsalis pedis pulses were intact. Asssessment:   Patient mid to the hospital with complicated pancreatitis. He is status post EGD/EUS and placement of Dobbhoff tube. His feeding tube was not functioning. Impression:   1. Pancreatitis with complication. 2.  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 Plan:   1. I pulled out his tube few centimeter. He seems like it was gained in the posterior pharyngeal area. After stripping the tube I was able to flush with water and then later with Coke.   Patient is also feeling better as he was complaining of throat pain and discomfort in the back of his throat and posterior nasal area.  2.  Patient can be discharged home to be follow-up with Dr. Traci Donnelly in the next few weeks. 3.  His tube can be used for feeding purposes starting now.     Wicho Garza MD  3/25/2022, 11:46 AM

## 2022-03-25 NOTE — PROGRESS NOTES
Comprehensive Nutrition Assessment    Type and Reason for Visit:  Reassess    Nutrition Recommendations/Plan:   Recommend initiate EN per current orders: Vital AF 1.2 at 25 ml/hr and advance by 10 ml q8hrs as tolerated until goal rate of 65 ml/hr achieved (If bolus desired: 260 ml of Vital AF 6X/day). Nutrition Assessment:  Pt remains malnourished AEB po intake <50% of estimated needs X 6 days d/t continued clear liquids and inability to initiate EN secondary to malfunctioning DHT. Per pt's RN, tube now able to flush and x-ray results show \"tip of the enteric tube within the gastric antrum. \"     Elemental formula (Vital AF or equivalent) required to allow pancreatic rest. Standard Formula trial not appropriate as it would stress the pancreas and delay healing. Malnutrition Assessment:  Malnutrition Status:  Severe malnutrition    Context:  Acute Illness     Findings of the 6 clinical characteristics of malnutrition:  Energy Intake:  7 - 50% or less of estimated energy requirements for 5 or more days  Weight Loss:  7 - Greater than 5% over 1 month     Body Fat Loss:  Unable to assess     Muscle Mass Loss:  Unable to assess    Fluid Accumulation:  No significant fluid accumulation     Strength:  Not Performed    Estimated Daily Nutrient Needs:  Energy (kcal):  4657-2744 kcals/day; Weight Used for Energy Requirements:  Current (20-25 kcals/kg)     Protein (g):  101-118 g/pro/day; Weight Used for Protein Requirements:  Ideal (1.2-1.4 g/kg)        Fluid (ml/day):  6989-4103 mL/fluid/day; Method Used for Fluid Requirements:  1 ml/kcal      Nutrition Related Findings:  LR @ 150 ml/hr      Current Nutrition Therapies:    ADULT DIET; Clear Liquid  ADULT TUBE FEEDING; Nasoenteric; Peptide Based; Continuous; 25; Yes; 10; Q 8 hours; 65; 0; Other (specify); If IVF d/c'd initiate 30 ml/hr H2O flush via pump.   Current Tube Feeding (TF) Orders:  · Feeding Route: Nasogastric  · Formula: Peptide Based  · Schedule: Continuous   · Water Flushes: 0 ml/hr d/t IVF (LR @ 150 ml/hr)  · Current TF & Flush Orders Provides: 0  · Goal TF & Flush Orders Provides: Vital AF @ 65 ml/hr = 1872 kcals, 117 g Protein, 173 g CHO, 84 g Fat, 1265 ml/fluid/day from formula. Anthropometric Measures:  · Height: 6' 1\" (185.4 cm)  · Current Body Weight: 197 lb (89.4 kg)   · Admission Body Weight: 197 lb (89.4 kg)    · Usual Body Weight: 210 lb (95.3 kg) (2/28/2022)     · Ideal Body Weight: 184 lbs  · BMI: 26   · BMI Categories: Overweight (BMI 25.0-29. 9)       Nutrition Diagnosis:   · Severe malnutrition,In context of acute illness or injury related to altered GI function,inadequate protein-energy intake as evidenced by poor intake prior to admission,weight loss greater than or equal to 5% in 1 month    Nutrition Interventions:   Food and/or Nutrient Delivery:  Start Tube Feeding  Nutrition Education/Counseling:  Education not indicated   Coordination of Nutrition Care:  Continue to monitor while inpatient    Goals:  Pt will tolerate EN to meet nutritional needs.        Nutrition Monitoring and Evaluation:   Food/Nutrient Intake Outcomes:  Diet Advancement/Tolerance,Food and Nutrient Intake,Enteral Nutrition Intake/Tolerance  Physical Signs/Symptoms Outcomes:  Biochemical Data,Nutrition Focused Physical Findings,Weight,GI Status     Discharge Planning:    Enteral Nutrition     Electronically signed by Samson Zuluaga, MS, RD, LD on 3/25/22 at 3:29 PM CDT    Contact: [phone]  Cell: [phone]

## 2022-03-25 NOTE — PLAN OF CARE
Nutrition Problem #1: Severe malnutrition,In context of acute illness or injury  Intervention: Food and/or Nutrient Delivery: Start Tube Feeding  Nutritional Goals: Pt will tolerate EN to meet nutritional needs.     Problem: Nutrition  Goal: Optimal nutrition therapy  Outcome: Ongoing

## 2022-03-25 NOTE — CARE COORDINATION
Updated dietary note sent to [address] [place] [address] Dr Araujo. They are past cut off time, so unable to deliver supplies until tomorrow (3/25) at the earliest. Weekend team updated.    [facility]  [phone] P   [phone] main fax for [facility] option care weekend coverage  Electronically signed by Esme Zavala on 3/25/2022 at 3:36 PM

## 2022-03-26 VITALS
BODY MASS INDEX: 26.11 KG/M2 | WEIGHT: 197 LBS | HEART RATE: 94 BPM | SYSTOLIC BLOOD PRESSURE: 142 MMHG | DIASTOLIC BLOOD PRESSURE: 77 MMHG | RESPIRATION RATE: 20 BRPM | TEMPERATURE: 97.7 F | HEIGHT: 73 IN | OXYGEN SATURATION: 95 %

## 2022-03-26 LAB
ALBUMIN SERPL-MCNC: 3 G/DL (ref 3.5–5.2)
ALP BLD-CCNC: 82 U/L (ref 40–130)
ALT SERPL-CCNC: 24 U/L (ref 5–41)
ANION GAP SERPL CALCULATED.3IONS-SCNC: 11 MMOL/L (ref 7–19)
AST SERPL-CCNC: 19 U/L (ref 5–40)
BASOPHILS ABSOLUTE: 0 K/UL (ref 0–0.2)
BASOPHILS RELATIVE PERCENT: 0.4 % (ref 0–1)
BILIRUB SERPL-MCNC: 0.5 MG/DL (ref 0.2–1.2)
BUN BLDV-MCNC: 8 MG/DL (ref 8–23)
CALCIUM SERPL-MCNC: 8.5 MG/DL (ref 8.8–10.2)
CHLORIDE BLD-SCNC: 106 MMOL/L (ref 98–111)
CO2: 23 MMOL/L (ref 22–29)
CREAT SERPL-MCNC: 0.6 MG/DL (ref 0.5–1.2)
EOSINOPHILS ABSOLUTE: 0.2 K/UL (ref 0–0.6)
EOSINOPHILS RELATIVE PERCENT: 4.5 % (ref 0–5)
GFR AFRICAN AMERICAN: >59
GFR NON-AFRICAN AMERICAN: >60
GLUCOSE BLD-MCNC: 143 MG/DL (ref 74–109)
HCT VFR BLD CALC: 31.7 % (ref 42–52)
HEMOGLOBIN: 10.3 G/DL (ref 14–18)
IMMATURE GRANULOCYTES #: 0 K/UL
LYMPHOCYTES ABSOLUTE: 1.3 K/UL (ref 1.1–4.5)
LYMPHOCYTES RELATIVE PERCENT: 24.3 % (ref 20–40)
MCH RBC QN AUTO: 30.4 PG (ref 27–31)
MCHC RBC AUTO-ENTMCNC: 32.5 G/DL (ref 33–37)
MCV RBC AUTO: 93.5 FL (ref 80–94)
MONOCYTES ABSOLUTE: 0.4 K/UL (ref 0–0.9)
MONOCYTES RELATIVE PERCENT: 7.4 % (ref 0–10)
NEUTROPHILS ABSOLUTE: 3.3 K/UL (ref 1.5–7.5)
NEUTROPHILS RELATIVE PERCENT: 63 % (ref 50–65)
PDW BLD-RTO: 13.3 % (ref 11.5–14.5)
PLATELET # BLD: 177 K/UL (ref 130–400)
PMV BLD AUTO: 9.6 FL (ref 9.4–12.4)
POTASSIUM REFLEX MAGNESIUM: 3.8 MMOL/L (ref 3.5–5)
RBC # BLD: 3.39 M/UL (ref 4.7–6.1)
SODIUM BLD-SCNC: 140 MMOL/L (ref 136–145)
TOTAL PROTEIN: 5 G/DL (ref 6.6–8.7)
WBC # BLD: 5.3 K/UL (ref 4.8–10.8)

## 2022-03-26 PROCEDURE — C9113 INJ PANTOPRAZOLE SODIUM, VIA: HCPCS | Performed by: INTERNAL MEDICINE

## 2022-03-26 PROCEDURE — 6360000002 HC RX W HCPCS: Performed by: INTERNAL MEDICINE

## 2022-03-26 PROCEDURE — 85025 COMPLETE CBC W/AUTO DIFF WBC: CPT

## 2022-03-26 PROCEDURE — 80053 COMPREHEN METABOLIC PANEL: CPT

## 2022-03-26 PROCEDURE — 36415 COLL VENOUS BLD VENIPUNCTURE: CPT

## 2022-03-26 PROCEDURE — 2580000003 HC RX 258: Performed by: INTERNAL MEDICINE

## 2022-03-26 PROCEDURE — 6370000000 HC RX 637 (ALT 250 FOR IP): Performed by: INTERNAL MEDICINE

## 2022-03-26 RX ORDER — PANTOPRAZOLE SODIUM 40 MG/1
40 TABLET, DELAYED RELEASE ORAL
Qty: 60 TABLET | Refills: 0 | Status: SHIPPED | OUTPATIENT
Start: 2022-03-26 | End: 2022-04-22 | Stop reason: SDUPTHER

## 2022-03-26 RX ADMIN — SODIUM CHLORIDE, PRESERVATIVE FREE 40 MG: 5 INJECTION INTRAVENOUS at 06:14

## 2022-03-26 RX ADMIN — SODIUM CHLORIDE, PRESERVATIVE FREE 40 MG: 5 INJECTION INTRAVENOUS at 17:38

## 2022-03-26 RX ADMIN — TAMSULOSIN HYDROCHLORIDE 0.4 MG: 0.4 CAPSULE ORAL at 09:03

## 2022-03-26 NOTE — CARE COORDINATION
Informed by nurse that [facility] needs additional information. RAJEEV called and spoke with Juan at [facility]. She states \" He is not approved by Medicare. Medicare needs documentation on why the patient needs a speciality formula. Medicare also needs to know what formulas have been tried and failed. Need to know the length of need too. The problem is, Medicare isn't open on the weekend. I could send the answers to these questions to Medicare and the pt could dc home, however, Medicare could come back on Monday with a new list of questions and end up not covering it\" Will inform nurse of the need for documentation for what is in bold.      [facility]   P   [phone] main fax for [facility] option care weekend coverage  Electronically signed by Nicola Beltrán on 3/26/2022 at 3:06 PM

## 2022-03-26 NOTE — PROGRESS NOTES
Nutrition Education    Nutrition Assessment: EN running at 35 ml/hr with no s/sx of intolerance, pt denies abd pain and diarrhea. Pt provided with clear liquid and pancreatitis nutrition therapy education. Handouts verbally reviewed, questions sought and answered. Continue current EN per orders. Nutrition Intervention:  · Continue Enteral Nutrition  · Verbally reviewed information with Patient  · Educated on Pancreatitis/Clear Liquids  · Written educational materials provided. · Contact name and number provided. Current Diet Orders:  ADULT DIET; Clear Liquid  ADULT TUBE FEEDING; Nasoenteric; Peptide Based; Continuous; 25; Yes; 10; Q 8 hours; 65; 0; Other (specify); If IVF d/c'd initiate 30 ml/hr H2O flush via pump.     Electronically signed by Celsa Redman MS, RD, LD on 3/26/22 at 7:24 AM CDT    Contact: [phone]

## 2022-03-26 NOTE — DISCHARGE INSTR - DIET
Good nutrition is important when healing from an illness, injury, or surgery. Follow any nutrition recommendations given to you during your hospital stay. If you were given an oral nutrition supplement while in the hospital, continue to take this supplement at home. You can take it with meals, in-between meals, and/or before bedtime. These supplements can be purchased at most local grocery stores, pharmacies, and chain [employer]stores. If you have any questions about your diet or nutrition, call the hospital and ask for the dietitian. Diet for Chronic Pancreatitis: Care Instructions  Overview     The pancreas is an organ behind the stomach that makes hormones and enzymes to help your body digest food. Sometimes the enzymes attack another part of the pancreas, which can cause pain and swelling. This is called pancreatitis. Chronic pancreatitis may cause you to be in pain much of the time. You may be able to help the pain by avoiding alcohol and eating a low-fat diet. Your doctor and dietitian can help you make an eating plan that does not irritate your digestive system. Always talk with your doctor or dietitian before you make changes in your diet. Follow-up care is a key part of your treatment and safety. Be sure to make and go to all appointments, and call your doctor if you are having problems. It's also a good idea to know your test results and keep a list of the medicines you take. How can you care for yourself at home? Do not drink alcohol. It may make your pain worse and cause other problems. Tell your doctor if you need help to quit. Counseling, support groups, and sometimes medicines can help you stay sober. Ask your doctor if you need to take pancreatic enzyme pills to help your body digest fat and protein. Drink plenty of fluids. If you have kidney, heart, or liver disease and have to limit fluids, talk with your doctor before you increase the amount of fluids you drink.   Eat a low-fat diet Eat many small meals and snacks each day instead of three large meals. Choose lean meats. Eat no more than 5 to 6½ ounces of meat a day. Cut off all fat you can see. Eat chicken and turkey without the skin. Many types of fish, such as salmon, lake trout, tuna, and herring, provide healthy omega-3 fat. But avoid fish canned in oil, such as sardines in olive oil. Bake, broil, or grill meats, poultry, or fish instead of frying them in butter or fat. Drink or eat nonfat or low-fat milk, yogurt, cheese, or other milk products each day. Read the labels on cheeses, and choose those with less than 5 grams of fat an ounce. Try fat-free sour cream, cream cheese, or yogurt. Avoid cream soups and cream sauces on pasta. Eat low-fat ice cream, frozen yogurt, or sorbet. Avoid regular ice cream.  Eat whole-grain cereals, breads, crackers, rice, or pasta. Avoid high-fat foods such as croissants, scones, biscuits, waffles, doughnuts, muffins, granola, and high-fat breads. Flavor your foods with herbs and spices (such as basil, tarragon, or mint), fat-free sauces, or lemon juice instead of butter. You can also use butter substitutes, fat-free mayonnaise, or fat-free dressing. Try applesauce, prune puree, or mashed bananas to replace some or all of the fat when you bake. Limit fats and oils, such as butter, margarine, mayonnaise, and salad dressing, to no more than 1 tablespoon a meal.  Avoid high-fat foods, such as:  Chocolate, whole milk, ice cream, processed cheese, and egg yolks. Fried or buttered foods. Sausage, salami, and oliveira. Cinnamon rolls, cakes, pies, cookies, and other pastries. Prepared snack foods, such as potato chips, nut and granola bars, and mixed nuts. Coconut and avocado. Learn how to read food labels for serving sizes and ingredients. Fast-food and convenience-food meals often have lots of fat. Where can you learn more? Go to https://kate.[facility]. org and sign in to your [employer] account. Enter W937 in the [facility] box to learn more about \"Diet for Chronic Pancreatitis: Care Instructions. \"     If you do not have an account, please click on the \"Sign Up Now\" link. Current as of: September 8, 2021               Content Version: 13.1  © 5468-9091 [employer], [employer]. Care instructions adapted under license by [facility] [place] ([facility]). If you have questions about a medical condition or this instruction, always ask your healthcare professional. [address] any warranty or liability for your use of this information.

## 2022-03-26 NOTE — DISCHARGE SUMMARY
Daniel Glasgow  :  1950  MRN:  [id number]    Admit date:  3/22/2022  Discharge date: 3/26/22     Discharging Physician:  Dr. Kylah Alexandre Directive: Full Code    Consults: Damari Mathis     Primary Care Physician:  Nathan Doran MD    Discharge Diagnoses:  Principal Problem:    Acute recurrent pancreatitis  Active Problems:    Urinary retention    Severe malnutrition ([address]  Resolved Problems:    * No resolved hospital problems. *      Portions of this note have been copied forward, however, changed to reflect the most current clinical status of this patient. Hospital Course:   Patient is a 62-year-old male with past medical history of metastatic renal carcinoma and recent pancreatitis who presented to [facility] ED complaining of abdominal pain. The patient reported initially coming to the ED for Lemos catheter removal that was placed for urinary retention on 3/18/2022. While present for Lemos catheter removal patient also endorsed he had been having increased nausea and early Saturday. Patient denied fever, chills, shortness of breath, chest pain, vomiting, or diarrhea. ED work-up revealed ALT 55, lipase 215, hemoglobin 12.9, and CT abdomen showed recent acute pancreatitis with a area of walled off necrosis surrounding the pancreas, mesenteric vascular congestion with trace intraperitoneal free fluid which is secondary to mass-effect on the SMV and proximal main portal vein, and cholelithiasis. Decision was made to admit the patient for pancreatitis. GI was consulted recommended clear liquid diet and IV hydration with possible endoscopic ultrasound.   Patient underwent the EGD with endoscopic ultrasound which indicated findings of gastric lumen with appearance of extrinsic compression most notable in the distal stomach, suspicious for functional gastric outlet obstruction from concentric fluid collections seen on CT, duodenum appeared significantly inflamed with mild to moderate duodenitis. FNA of pancreatic fluid collection via EUS was completed by GI and patient had postpyloric nasogastric feeding tube placed by GI during this procedure. GI recommended oral PPI twice daily, clear liquid diet for comfort, and dietitian consult for tube feeds. GI recommended tube feedings to allow the pancreas to rest for at least the next 6 days. Social work assisted in obtaining tube feeding materials. Dietitian recommended vital AF 1.2 and bolus feedings of 260 mL 6 times a day. Patient has tolerated feedings while inpatient without any difficulty. Per GI patient can follow-up outpatient in 2 to 3 weeks. Patient is now medically stable for discharge home. Patient already has outpatient follow-up scheduled with GI and urology to have Lemos removed. Significant Diagnostic Studies:   CT ABDOMEN PELVIS W IV CONTRAST Additional Contrast? None    Result Date: 3/22/2022  CT ABDOMEN PELVIS W IV CONTRAST 3/22/2022 8:42 AM HISTORY: Abdominal pain COMPARISON: CT scan dated 3/9/2022. DLP: 2422 mGy cm TECHNIQUE: Following the uneventful administration of intravenous iodinated contrast, helical CT tomographic images of the abdomen and pelvis were acquired. Coronal reformatted images were also provided for review. Automated exposure control was also utilized to decrease patient radiation dose. FINDINGS: Bilateral small layering pleural effusions. Coronary atheromatous calcification. LIVER: No focal liver lesion. The hepatic vasculature is patent. BILIARY SYSTEM: Cholelithiasis. Gallbladder is decompressed. Biliary tree is nondilated. PANCREAS: Recent pancreatitis. There is a 9.2 x 5.5 x 7.1 cm focus of walled off necrosis anterior to the pancreas, with associated mass effect on the pancreas.  There is a second focus of walled off necrosis which is seen posterior to the pancreatic head measuring up to 5.2 cm in greatest axial diameter by 4.1 cm craniocaudal. Main pancreatic duct is nondilated. SPLEEN: Normal size. There is a tiny subcapsular low density collection which is basically a developing pseudocyst due to the recent pancreatitis. No infarct identified although the native splenic vein appears occluded. KIDNEYS AND ADRENALS: Adrenal glands are unremarkable. Bilateral small benign renal cysts which require no follow-up. No hydronephrosis. RETROPERITONEUM: No adenopathy or hemorrhage. GI TRACT: Stomach is nondistended. The small bowel is nondilated. Redundant sigmoid colon. There are a few colonic diverticula. Mass effect on the transverse colon as a result of the walled off necrosis. Moderate stool identified along the ascending and proximal transverse colon. Next OTHER: Trace intraperitoneal free fluid. Mesenteric vascular congestion. No organized peritoneal collection. No intraperitoneal free air. Advanced atheromatous vascular calcification. No acute bony abnormality. PELVIS: No pelvic mass or pelvic adenopathy. Lemos catheter in place. 1. Recent acute pancreatitis with areas of walled off necrosis surrounding the pancreas. This includes a fairly large area of walled off necrosis anterior to the pancreas, measuring up to 9.2 cm in greatest diameter. There is mass effect on the pancreas as well as the mid transverse colon, however, I do not see any significant inflammatory change along the colon or evidence of actual mechanical obstruction. The small bowel is nondilated. 2. Mesenteric vascular congestion with trace intraperitoneal free fluid which I believe is secondary to the mass effect on the SMV and proximal main portal vein. I do not see any portal vein thrombus. 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 3. Bilateral small layering pleural effusions. 4. Cholelithiasis.  Signed by Dr Eduin Reed    EGD    Result Date: 3/23/2022  No dictation     EUS    Result Date: 3/23/2022  No dictation       Pertinent Labs:   CBC:   Recent Labs     03/24/22  0433 03/25/22  0351 03/26/22  0340 WBC 5.1 4.9 5.3   HGB 9.8* 9.9* 10.3*    187 177     BMP:    Recent Labs     03/24/22  0433 03/25/22  0351 03/26/22  0340    140 140   K 4.4 4.0 3.8    104 106   CO2 25 25 23   BUN 15 9 8   CREATININE 0.7 0.6 0.6   GLUCOSE 112* 134* 143*     INR: No results for input(s): INR in the last 72 hours. Physical Exam:  Vital Signs: /73   Pulse 99   Temp 97.7 °F (36.5 °C) (Temporal)   Resp 18   Ht 6' 1\" (1.854 m)   Wt 197 lb (89.4 kg)   SpO2 94%   BMI 25.99 kg/m²   Physical Exam  Vitals and nursing note reviewed. Constitutional:       Appearance: He is not ill-appearing. HENT:      Head: Normocephalic. Nose:      Comments: NG to left nare     Mouth/Throat:      Mouth: Mucous membranes are moist.      Pharynx: Oropharynx is clear. Eyes:      Pupils: Pupils are equal, round, and reactive to light. Cardiovascular:      Rate and Rhythm: Normal rate and regular rhythm. Pulses: Normal pulses. Heart sounds: Normal heart sounds. Pulmonary:      Effort: Pulmonary effort is normal.      Breath sounds: Normal breath sounds. Abdominal:      General: Abdomen is flat. Bowel sounds are normal. There is no distension. Tenderness: There is no abdominal tenderness. There is no guarding. Genitourinary:     Comments: Lemos catheter in place, leg bag and use  Musculoskeletal:         General: Normal range of motion. Cervical back: Normal range of motion and neck supple. Right lower leg: No edema. Left lower leg: No edema. Skin:     General: Skin is warm and dry. Capillary Refill: Capillary refill takes less than 2 seconds. Neurological:      General: No focal deficit present. Mental Status: He is alert and oriented to person, place, and time. Psychiatric:         Mood and Affect: Mood normal.         Behavior: Behavior normal.         Thought Content:  Thought content normal.         Judgment: Judgment normal.         Discharge Medications: Medication List      CONTINUE taking these medications    calcium carbonate 500 MG chewable tablet  Commonly known as: TUMS     cetirizine 10 MG tablet  Commonly known as: ZYRTEC     famotidine 20 MG tablet  Commonly known as: Pepcid  Take 1 tablet by mouth daily     lipase-protease-amylase 10964-23765 units delayed release capsule  Commonly known as: CREON  Take 1 capsule by mouth 3 times daily (with meals)     niacin 100 MG tablet     tamsulosin 0.4 MG capsule  Commonly known as: FLOMAX     therapeutic multivitamin-minerals tablet     vitamin C 500 MG tablet  Commonly known as: ASCORBIC ACID     vitamin E 1000 units capsule            Discharge Instructions: Follow up with Moise Kenyon MD in 5-7 days. Take medications as directed. Resume activity as tolerated. Diet: ADULT DIET; Clear Liquid  ADULT TUBE FEEDING; Nasoenteric; Peptide Based; Continuous; 25; Yes; 10; Q 8 hours; 65; 0; Other (specify); If IVF d/c'd initiate 30 ml/hr H2O flush via pump. Disposition: Patient is Stableand will be discharged to Home. Time spent on discharge 38 minutes spent in assessing patient, reviewing medications, discussion with nursing, confirming safe discharge plan and preparation of discharge summary.     Signed:  Cyndra Holter, APRN - CNP, 3/26/2022 12:20 PM

## 2022-03-26 NOTE — PLAN OF CARE
Problem: Discharge Planning:  Goal: Participates in care planning  Description: Participates in care planning  Outcome: Ongoing     Problem: Falls - Risk of:  Goal: Will remain free from falls  Description: Will remain free from falls  Outcome: Ongoing  Goal: Absence of physical injury  Description: Absence of physical injury  Outcome: Ongoing     Problem: Nutritional:  Goal: Ability to tolerate tube feedings without aspirating will improve  Description: Ability to tolerate tube feedings without aspirating will improve  Outcome: Ongoing  Goal: Consumption of food in small portions  Description: Consumption of food in small portions  Outcome: Ongoing  Goal: Consumption of liquid of appropriate consistency  Description: Consumption of liquid of appropriate consistency  Outcome: Ongoing     Problem: Respiratory:  Goal: Ability to maintain a clear airway will improve  Description: Ability to maintain a clear airway will improve  Outcome: Ongoing  Goal: Will remain free from infection  Description: Will remain free from infection  Outcome: Ongoing  Goal: Absence of aspiration  Description: Absence of aspiration  Outcome: Ongoing     Problem: Safety:  Goal: Ability to chew and swallow food without choking will improve  Description: Ability to chew and swallow food without choking will improve  Outcome: Ongoing  Goal: Ability to demonstrate good, daily oral hygiene techniques will improve  Description: Ability to demonstrate good, daily oral hygiene techniques will improve  Outcome: Ongoing  Goal: Maintenance of upright position during and after feeding  Description: Maintenance of upright position during and after feeding  Outcome: Ongoing     Problem: Nutrition  Goal: Optimal nutrition therapy  3/25/2022 2009 by Vanessa Gutierrez RN  Outcome: Ongoing  3/25/2022 1531 by Reji Villanueva, MS, RD, LD  Outcome: Ongoing

## 2022-03-27 LAB
ANAEROBIC CULTURE: NORMAL
BODY FLUID CULTURE, STERILE: NORMAL
GRAM STAIN RESULT: NORMAL

## 2022-03-29 ENCOUNTER — OFFICE VISIT (OUTPATIENT)
Dept: UROLOGY | Age: 72
End: 2022-03-29
Payer: COMMERCIAL

## 2022-03-29 ENCOUNTER — TELEPHONE (OUTPATIENT)
Dept: INTERNAL MEDICINE | Age: 72
End: 2022-03-29

## 2022-03-29 VITALS — WEIGHT: 197 LBS | TEMPERATURE: 98 F | BODY MASS INDEX: 26.11 KG/M2 | HEIGHT: 73 IN

## 2022-03-29 DIAGNOSIS — N13.8 BPH WITH OBSTRUCTION/LOWER URINARY TRACT SYMPTOMS: Primary | ICD-10-CM

## 2022-03-29 DIAGNOSIS — N40.1 BPH WITH OBSTRUCTION/LOWER URINARY TRACT SYMPTOMS: Primary | ICD-10-CM

## 2022-03-29 DIAGNOSIS — Z85.528 PERSONAL HISTORY OF RENAL CELL CANCER: ICD-10-CM

## 2022-03-29 DIAGNOSIS — R33.9 URINARY RETENTION: ICD-10-CM

## 2022-03-29 PROCEDURE — 1036F TOBACCO NON-USER: CPT | Performed by: NURSE PRACTITIONER

## 2022-03-29 PROCEDURE — 1111F DSCHRG MED/CURRENT MED MERGE: CPT | Performed by: NURSE PRACTITIONER

## 2022-03-29 PROCEDURE — G8427 DOCREV CUR MEDS BY ELIG CLIN: HCPCS | Performed by: NURSE PRACTITIONER

## 2022-03-29 PROCEDURE — 3017F COLORECTAL CA SCREEN DOC REV: CPT | Performed by: NURSE PRACTITIONER

## 2022-03-29 PROCEDURE — G8417 CALC BMI ABV UP PARAM F/U: HCPCS | Performed by: NURSE PRACTITIONER

## 2022-03-29 PROCEDURE — 99204 OFFICE O/P NEW MOD 45 MIN: CPT | Performed by: NURSE PRACTITIONER

## 2022-03-29 PROCEDURE — 1123F ACP DISCUSS/DSCN MKR DOCD: CPT | Performed by: NURSE PRACTITIONER

## 2022-03-29 PROCEDURE — 4040F PNEUMOC VAC/ADMIN/RCVD: CPT | Performed by: NURSE PRACTITIONER

## 2022-03-29 PROCEDURE — G8484 FLU IMMUNIZE NO ADMIN: HCPCS | Performed by: NURSE PRACTITIONER

## 2022-03-29 RX ORDER — TAMSULOSIN HYDROCHLORIDE 0.4 MG/1
0.8 CAPSULE ORAL DAILY
Qty: 180 CAPSULE | Refills: 3 | Status: SHIPPED | OUTPATIENT
Start: 2022-03-29 | End: 2022-04-01 | Stop reason: SDUPTHER

## 2022-03-29 NOTE — TELEPHONE ENCOUNTER
[facility]   3/31/2022  1:30 PM Daniel White DO N LPS Gen SG [facility]   4/1/2022  9:30 AM Jamel Schilder, MD LPS [facility]   4/22/2022  8:30 AM RAY Humphrey - CNP N LPS URO [facility]   5/12/2022  8:00 AM Jamel Schilder, MD [place], [place]

## 2022-03-29 NOTE — PROGRESS NOTES
Patient of D. Shelvy Cabot presents today for voiding trial post Urinary Retention. The patient denies any fever, chills or  N&V. After patient had given consent, using the catheter in place, 120cc of sterile water was installed into the bladder with no complications. Patient was able to void 150cc. RAY Marie was in office at time of procedure. Patient was advised to drink clear fluids for the next couple hours and urinate. Patient was advised they may experience some blood in the urine and burning with urination for the next couple days. If the patient is unable to urinate or develops fever, chills, N&V or suprapubic pain, they will call office for an appt at clinic or seek medical treatment at nearest ER, PCP or Urgent Care if after hours. Patient verbalized understanding and all questions were answered. Patient advised to call the office with any questions or concerns. Patient is to follow up as scheduled.

## 2022-03-29 NOTE — PROGRESS NOTES
Matt Quezada is a 73 y.o., male, New patient who presents today   Chief Complaint   Patient presents with    New Patient     I am here today for my new patient appointment for urinary retention, cath was placed in the ER        HPI   Patient presents for follow-up after episode of urinary retention. He reports this is his first episode. It did come on suddenly and occurred after he was discharged from the hospital for pancreatitis at the end of February. He presented to the emergency room for evaluation where about 1 L of urine was drained from the bladder. Prior to his episode of retention, he had been followed by Dr. Louis Nava in [facility] for [facility]. He had already been maintained on Flomax every other day. He reports taking the medication every day made him severely dizzy, however, when he noticed his stream was worsening after his hospitalization, he did increase the dosing to daily. He states he did have 1 incident of elevated postvoid residual around 400 mL at his last checkup in December, however, he was constipated at that time. He also has a history of a left nephrectomy secondary to renal cell carcinoma in 2016. He is followed by [facility]. He reports his brother has a history of prostate cancer and was diagnosed in his late 46s. He denies any significant family history of bladder cancer. He denies any personal history of nephrolithiasis or hematuria. Past PSAs have been within normal limits. PSA has not been checked this year, but would not check in the setting of urinary retention. Lab Results   Component Value Date    PSA 1.61 03/12/2021    PSA 1.65 03/05/2020       REVIEW OF SYSTEMS:  Review of Systems   Constitutional: Negative for chills and fever. Gastrointestinal: Positive for abdominal pain (recent issues of pancreatitis). Negative for abdominal distention, nausea and vomiting. Genitourinary: Positive for difficulty urinating. Negative for dysuria, flank pain and hematuria. Musculoskeletal: Negative for back pain and gait problem. Psychiatric/Behavioral: Negative for agitation and confusion. PHYSICAL EXAM:  Temp 98 °F (36.7 °C) (Temporal)   Ht 6' 1\" (1.854 m)   Wt 197 lb (89.4 kg)   BMI 25.99 kg/m²   Physical Exam  Vitals and nursing note reviewed. Constitutional:       General: He is not in acute distress. Appearance: Normal appearance. He is not ill-appearing. Pulmonary:      Effort: Pulmonary effort is normal. No respiratory distress. Abdominal:      General: There is no distension. Tenderness: There is no abdominal tenderness. There is no right CVA tenderness or left CVA tenderness. Genitourinary:     Prostate: Enlarged (50-60g). Not tender and no nodules present (outer right rim more indurated but no definite nodule). Neurological:      Mental Status: He is alert and oriented to person, place, and time. Mental status is at baseline. Psychiatric:         Mood and Affect: Mood normal.         Behavior: Behavior normal.         DATA:  No results found for this visit on 03/29/22. IMAGING:  Impression   1. Recent acute pancreatitis with areas of walled off necrosis   surrounding the pancreas. This includes a fairly large area of walled   off necrosis anterior to the pancreas, measuring up to 9.2 cm in   greatest diameter. There is mass effect on the pancreas as well as the   mid transverse colon, however, I do not see any significant   inflammatory change along the colon or evidence of actual mechanical   obstruction. The small bowel is nondilated. 2. Mesenteric vascular congestion with trace intraperitoneal free   fluid which I believe is secondary to the mass effect on the SMV and   proximal main portal vein. I do not see any portal vein thrombus. 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 3. Bilateral small layering pleural effusions. 4. Cholelithiasis.    Signed by Dr Bhavin Brumfield     I reviewed the CT images and agree with radiologist dilatation. Patient is following with GI as well as general surgery for his recent pancreatitis issues. In regards to the urinary system, I did note bilateral renal cyst that are benign, otherwise, no significant findings. ASSESSMENT/PLAN  1. BPH with obstruction/lower urinary tract symptoms  Patient with episode of urinary retention. Presents for voiding trial today. He was able to empty his bladder in the office today. We will increase his Flomax to see if he will be able to tolerate the dosing. We will plan to follow-up in about 3 weeks to ensure he continues to empty his bladder. Should he need to undergo TURP, we will be happy to accommodate this. He will continue to follow-up with MD [facility] Alvarenga for his renal cancer. 2. Urinary retention  See above      No orders of the defined types were placed in this encounter. Return in about 3 weeks (around 4/19/2022). An electronic signature was used to authenticate this note. ALBINO LY, RAY - CNP    All information inputted into the note by the MA to include chief complaint, past medical history, past surgical history, medications, allergies, social and family history and review of systems has been reviewed and updated as needed by me. EMR Dragon/transcription disclaimer: Much of this document is electronic transcription/translation of spoken language to printed text. The electronic translation of spoken language may be erroneous or, at times, nonsensical words or phrases may be inadvertently transcribed.  Although I have reviewed the document for such errors, some may still exist.

## 2022-03-31 ENCOUNTER — OFFICE VISIT (OUTPATIENT)
Dept: GASTROENTEROLOGY | Age: 72
End: 2022-03-31
Payer: COMMERCIAL

## 2022-03-31 ENCOUNTER — TELEPHONE (OUTPATIENT)
Dept: GASTROENTEROLOGY | Age: 72
End: 2022-03-31

## 2022-03-31 ENCOUNTER — OFFICE VISIT (OUTPATIENT)
Dept: SURGERY | Age: 72
End: 2022-03-31
Payer: COMMERCIAL

## 2022-03-31 VITALS
WEIGHT: 185 LBS | OXYGEN SATURATION: 98 % | HEIGHT: 73 IN | DIASTOLIC BLOOD PRESSURE: 65 MMHG | BODY MASS INDEX: 24.52 KG/M2 | HEART RATE: 106 BPM | SYSTOLIC BLOOD PRESSURE: 120 MMHG

## 2022-03-31 VITALS
DIASTOLIC BLOOD PRESSURE: 70 MMHG | WEIGHT: 189.6 LBS | BODY MASS INDEX: 25.13 KG/M2 | SYSTOLIC BLOOD PRESSURE: 120 MMHG | TEMPERATURE: 97.6 F | HEIGHT: 73 IN

## 2022-03-31 DIAGNOSIS — Z87.19 HX OF ACUTE PANCREATITIS: ICD-10-CM

## 2022-03-31 DIAGNOSIS — K85.90 ACUTE RECURRENT PANCREATITIS: ICD-10-CM

## 2022-03-31 DIAGNOSIS — Z87.19 HX OF ACUTE PANCREATITIS: Primary | ICD-10-CM

## 2022-03-31 DIAGNOSIS — K85.10 PANCREATITIS, GALLSTONE: Primary | ICD-10-CM

## 2022-03-31 PROBLEM — E86.0 DEHYDRATION: Status: RESOLVED | Noted: 2022-03-01 | Resolved: 2022-03-31

## 2022-03-31 LAB
ALBUMIN SERPL-MCNC: 4.1 G/DL (ref 3.5–5.2)
ALP BLD-CCNC: 105 U/L (ref 40–130)
ALT SERPL-CCNC: 31 U/L (ref 5–41)
AST SERPL-CCNC: 24 U/L (ref 5–40)
BILIRUB SERPL-MCNC: 0.5 MG/DL (ref 0.2–1.2)
BILIRUBIN DIRECT: 0.2 MG/DL (ref 0–0.3)
BILIRUBIN, INDIRECT: 0.3 MG/DL (ref 0.1–1)
LIPASE: 192 U/L (ref 13–60)
TOTAL PROTEIN: 6.7 G/DL (ref 6.6–8.7)

## 2022-03-31 PROCEDURE — G8427 DOCREV CUR MEDS BY ELIG CLIN: HCPCS | Performed by: SURGERY

## 2022-03-31 PROCEDURE — 1111F DSCHRG MED/CURRENT MED MERGE: CPT | Performed by: SURGERY

## 2022-03-31 PROCEDURE — 1123F ACP DISCUSS/DSCN MKR DOCD: CPT | Performed by: SURGERY

## 2022-03-31 PROCEDURE — 99213 OFFICE O/P EST LOW 20 MIN: CPT | Performed by: SURGERY

## 2022-03-31 PROCEDURE — 1111F DSCHRG MED/CURRENT MED MERGE: CPT | Performed by: NURSE PRACTITIONER

## 2022-03-31 PROCEDURE — 4040F PNEUMOC VAC/ADMIN/RCVD: CPT | Performed by: NURSE PRACTITIONER

## 2022-03-31 PROCEDURE — 1123F ACP DISCUSS/DSCN MKR DOCD: CPT | Performed by: NURSE PRACTITIONER

## 2022-03-31 PROCEDURE — G8427 DOCREV CUR MEDS BY ELIG CLIN: HCPCS | Performed by: NURSE PRACTITIONER

## 2022-03-31 PROCEDURE — G8484 FLU IMMUNIZE NO ADMIN: HCPCS | Performed by: NURSE PRACTITIONER

## 2022-03-31 PROCEDURE — G8484 FLU IMMUNIZE NO ADMIN: HCPCS | Performed by: SURGERY

## 2022-03-31 PROCEDURE — G8420 CALC BMI NORM PARAMETERS: HCPCS | Performed by: NURSE PRACTITIONER

## 2022-03-31 PROCEDURE — G8417 CALC BMI ABV UP PARAM F/U: HCPCS | Performed by: SURGERY

## 2022-03-31 PROCEDURE — 1036F TOBACCO NON-USER: CPT | Performed by: SURGERY

## 2022-03-31 PROCEDURE — 4040F PNEUMOC VAC/ADMIN/RCVD: CPT | Performed by: SURGERY

## 2022-03-31 PROCEDURE — 3017F COLORECTAL CA SCREEN DOC REV: CPT | Performed by: NURSE PRACTITIONER

## 2022-03-31 PROCEDURE — 3017F COLORECTAL CA SCREEN DOC REV: CPT | Performed by: SURGERY

## 2022-03-31 PROCEDURE — 99214 OFFICE O/P EST MOD 30 MIN: CPT | Performed by: NURSE PRACTITIONER

## 2022-03-31 PROCEDURE — 1036F TOBACCO NON-USER: CPT | Performed by: NURSE PRACTITIONER

## 2022-03-31 ASSESSMENT — ENCOUNTER SYMPTOMS
VOMITING: 0
COUGH: 0
BACK PAIN: 0
EYE PAIN: 0
ANAL BLEEDING: 0
CONSTIPATION: 0
SHORTNESS OF BREATH: 0
VOICE CHANGE: 0
ABDOMINAL DISTENTION: 1
DIARRHEA: 0
BLOOD IN STOOL: 0
WHEEZING: 0
NAUSEA: 1
SORE THROAT: 0
ABDOMINAL DISTENTION: 0
NAUSEA: 0
CHEST TIGHTNESS: 0
EYE REDNESS: 0
TROUBLE SWALLOWING: 0
ABDOMINAL PAIN: 0
RECTAL PAIN: 0
CONSTIPATION: 0
DIARRHEA: 0
FACIAL SWELLING: 0
SHORTNESS OF BREATH: 0
EYE DISCHARGE: 0
COUGH: 0
ABDOMINAL PAIN: 1

## 2022-03-31 NOTE — TELEPHONE ENCOUNTER
3. 31.22   pt's been notified of results and recommendations. Pt has an appt at [facility] on 4-14-22, will repeat labs then. Thanks.

## 2022-03-31 NOTE — TELEPHONE ENCOUNTER
Please let Nabor Baptiste know I have reviewed his labs. His liver enzymes are normal.  Lipase (pancreatic enzyme is still elevated however trending down nicely. It was 291 when he was discharged home from the hospital, today it was 192. Put him in recall to check it again in 2-3 weeks.  thanks

## 2022-03-31 NOTE — PROGRESS NOTES
Subjective:      Rizwana Calvert is a80 y.o. male  Chief Complaint   Patient presents with    New Patient       HPI  PCP: Moise Kenyon MD  Referring Provider: Semaj Lovell MD  New pt referral  From Dr Desi Fuentes  For hospital follow up. Pt developed gallstone pancreatitis which resulted in acute pancreatitis with pancreatic cyst formation, cysts were drained and a nasogastric tube was placed to allow for nutrition so his pancreas could rest    Hospital Course:   Patient is a 42-year-old male with past medical history of metastatic renal carcinoma and recent pancreatitis who presented to [facility] ED complaining of abdominal pain. The patient reported initially coming to the ED for Lemos catheter removal that was placed for urinary retention on 3/18/2022. While present for Lemos catheter removal patient also endorsed he had been having increased nausea and early Saturday. Patient denied fever, chills, shortness of breath, chest pain, vomiting, or diarrhea. ED work-up revealed ALT 55, lipase 215, hemoglobin 12.9, and CT abdomen showed recent acute pancreatitis with a area of walled off necrosis surrounding the pancreas, mesenteric vascular congestion with trace intraperitoneal free fluid which is secondary to mass-effect on the SMV and proximal main portal vein, and cholelithiasis. Decision was made to admit the patient for pancreatitis. GI was consulted recommended clear liquid diet and IV hydration with possible endoscopic ultrasound. Patient underwent the EGD with endoscopic ultrasound which indicated findings of gastric lumen with appearance of extrinsic compression most notable in the distal stomach, suspicious for functional gastric outlet obstruction from concentric fluid collections seen on CT, duodenum appeared significantly inflamed with mild to moderate duodenitis.   FNA of pancreatic fluid collection via EUS was completed by GI and patient had postpyloric nasogastric feeding tube placed by GI during this procedure. GI recommended oral PPI twice daily, clear liquid diet for comfort, and dietitian consult for tube feeds. GI recommended tube feedings to allow the pancreas to rest for at least the next 6 days. Social work assisted in obtaining tube feeding materials. Dietitian recommended vital AF 1.2 and bolus feedings of 260 mL 6 times a day. Patient has tolerated feedings while inpatient without any difficulty. Per GI patient can follow-up outpatient in 2 to 3 weeks. Patient is now medically stable for discharge home. Patient already has outpatient follow-up scheduled with GI and urology to have Lemos removed. EGD with EUS:  IMPRESSION:  1. Pancreatic fluid collections appearing to cause extrinsic compression on the distal stomach s/p FNA as above. 2. Successful placement of post pyloric DHT due to concerns for functional gastric outlet obstruction  3. Duodenitis. Fluid cytology:  FINAL DIAGNOSIS:   Pancreas, pancreatic cyst fluid, ThinPrep: Acellular debris. COMMENT:   Although the findings are consistent with cyst fluid, the   absence of epithelial elements imposes limitations upon this   interpretation.  Clinical correlation is suggested.     RECOMMENDATIONS:   - PPI twice daily  - Dietician to see for initiation of tube feeds  - Clear liquid diet for oral comfort  - If patient's symptoms continue to worsen, he may require repeat contrasted imaging in 1 week and/or transfer to tertiary care center if feeding status, nutrition, etc continue to worsen. S/p hospital discharge he has done well  Has advanced his diet on his own  eating a low fat diet   No abd pain  He is asymptomatic  NG tube still intact  He wants this out  Reports he has appt ith Dr Erica Elam today at 1 to discuss getting cholecystectomy scheduled        Family HX:    Pt denies family hx of colon polyps, colon CA, inflammatory bowel dx, gastric CA and esophageal CA.     Past Medical History:   Diagnosis Date    Metastatic renal cell carcinoma (HCC)     Nasopharyngeal mass     Metastatic renal cell carcinoma, clear cell          Past Surgical History:   Procedure Laterality Date    ADRENALECTOMY Left 10/2017    COLONOSCOPY  12/09/2019    Dr Eric Perea: Prolapsed Thrombosed Hemorrhoids    NOSE SURGERY  12/2017    Renal cell carcinoma, metastatic clear cell, April 2016, December 2017    PARTIAL NEPHRECTOMY Left 10/2017    t3a n0 m0    UPPER GASTROINTESTINAL ENDOSCOPY N/A 03/23/2022    Dr Jules Martino (Dr Shaw Suazo pt) w/EUS and fna-Pancreatic fluid collections appearing to cause extrinsic compression on the distal stomach, placement of post pyloric DHT, duodenitis    UPPER GASTROINTESTINAL ENDOSCOPY N/A 03/23/2022    Dr Jules Martino (Dr Shaw Suazo pt) w/EUS and fna-Pancreatic fluid collections appearing to cause extrinsic compression on the distal stomach, placement of post pyloric DHT, duodenitis       Social History     Socioeconomic History    Marital status:      Spouse name: None    Number of children: None    Years of education: None    Highest education level: None   Occupational History    None   Tobacco Use    Smoking status: Never Smoker    Smokeless tobacco: Never Used   Vaping Use    Vaping Use: Never used   Substance and Sexual Activity    Alcohol use: No    Drug use: No    Sexual activity: None   Other Topics Concern    None   Social History Narrative    None     Social Determinants of Health     Financial Resource Strain:     Difficulty of Paying Living Expenses: Not on file   Food Insecurity:     Worried About Running Out of Food in the Last Year: Not on file    Fabio of Food in the Last Year: Not on file   Transportation Needs:     Lack of Transportation (Medical): Not on file    Lack of Transportation (Non-Medical):  Not on file   Physical Activity:     Days of Exercise per Week: Not on file    Minutes of Exercise per Session: Not on file   Stress:     Feeling of Stress : Not on file   Social Connections:  Frequency of Communication with Friends and Family: Not on file    Frequency of Social Gatherings with Friends and Family: Not on file    Attends Spiritism Services: Not on file    Active Member of Clubs or Organizations: Not on file    Attends Club or Organization Meetings: Not on file    Marital Status: Not on file   Intimate Partner Violence:     Fear of Current or Ex-Partner: Not on file    Emotionally Abused: Not on file    Physically Abused: Not on file    Sexually Abused: Not on file   Housing Stability:     Unable to Pay for Housing in the Last Year: Not on file    Number of [place] in the Last Year: Not on file    Unstable Housing in the Last Year: Not on file       Allergies   Allergen Reactions    Nexium [Esomeprazole Magnesium] Rash    Prilosec [Omeprazole] Rash       Current Outpatient Medications   Medication Sig Dispense Refill    tamsulosin (FLOMAX) 0.4 MG capsule Take 2 capsules by mouth daily 180 capsule 3    pantoprazole (PROTONIX) 40 MG tablet Take 1 tablet by mouth 2 times daily (before meals) 60 tablet 0    calcium carbonate (TUMS) 500 MG chewable tablet Take 1 tablet by mouth as needed for Heartburn      cetirizine (ZYRTEC) 10 MG tablet Take 10 mg by mouth      lipase-protease-amylase (CREON) 44152-20323 units delayed release capsule Take 1 capsule by mouth 3 times daily (with meals) (Patient not taking: Reported on 3/31/2022) 90 capsule 0    Multiple Vitamins-Minerals (THERAPEUTIC MULTIVITAMIN-MINERALS) tablet Take 1 tablet by mouth daily (Patient not taking: Reported on 3/31/2022)      vitamin E 1000 units capsule Take 1,000 Units by mouth daily (Patient not taking: Reported on 3/31/2022)      vitamin C (ASCORBIC ACID) 500 MG tablet Take 500 mg by mouth daily (Patient not taking: Reported on 3/31/2022)      niacin 100 MG tablet Take 100 mg by mouth daily (with breakfast) (Patient not taking: Reported on 3/31/2022)       No current facility-administered medications for this visit. Review of Systems   Constitutional: Negative for appetite change, fatigue, fever and unexpected weight change. HENT: Negative for sore throat, trouble swallowing and voice change. Respiratory: Negative for cough and shortness of breath. Cardiovascular: Negative for chest pain, palpitations and leg swelling. Gastrointestinal: Negative for abdominal distention, abdominal pain, anal bleeding, blood in stool, constipation, diarrhea, nausea, rectal pain and vomiting. Genitourinary: Negative for hematuria. Musculoskeletal: Negative for arthralgias, back pain and neck pain. Neurological: Negative for dizziness, weakness, light-headedness and headaches. Psychiatric/Behavioral: Negative for dysphoric mood and sleep disturbance. The patient is not nervous/anxious. All other systems reviewed and are negative. Objective:     Physical Exam  Vitals and nursing note reviewed. Constitutional:       Appearance: He is well-developed. Comments: /65 (Site: Left Upper Arm)   Pulse 106   Ht 6' 1\" (1.854 m)   Wt 185 lb (83.9 kg)   SpO2 98%   BMI 24.41 kg/m²     NG tube intact and taped to his nose   Eyes:      General: No scleral icterus. Conjunctiva/sclera: Conjunctivae normal.      Pupils: Pupils are equal, round, and reactive to light. Neck:      Thyroid: No thyromegaly. Cardiovascular:      Rate and Rhythm: Normal rate and regular rhythm. Heart sounds: Normal heart sounds. No murmur heard. No friction rub. No gallop. Pulmonary:      Effort: Pulmonary effort is normal. No respiratory distress. Breath sounds: Normal breath sounds. Abdominal:      General: Bowel sounds are normal. There is no distension. Palpations: Abdomen is soft. Tenderness: There is no abdominal tenderness. There is no rebound. Musculoskeletal:         General: No deformity. Normal range of motion.       Cervical back: Normal range of motion and neck supple. Skin:     Coloration: Skin is not pale. Neurological:      Mental Status: He is alert and oriented to person, place, and time. Cranial Nerves: No cranial nerve deficit. Psychiatric:         Judgment: Judgment normal.           Assessment:       Diagnosis Orders   1. Hx of acute pancreatitis  Lipase    Hepatic Function Panel         Plan: 1. Labs today. Will discuss with Dr Joya Hunt when we can get him in for nasogastric feeding tube removal, hopeful we can get this done today or tomorrow. Will call him to let him know. F/u in about 4 weeks for re-evaluation  2. The operative note from EUS in his history is incorrect, it is not this patients EUS report/findings, I notified my MA  Marquise Damon to get this corrected in his chart.

## 2022-03-31 NOTE — PROGRESS NOTES
SUBJECTIVE:  Mr. COLES [facility] is a 73 y.o. male who presents today for follow-up after hospitalization for gallstone pancreatitis. Patient recently admitted again for pancreatitis with concern for pancreatic necrosis. Underwent EUS with FNA. Postpyloric tube placed at that time. Patient was seen by Dr. Janeal Goldberg office this a.m. and had his feeding tube removed. Still feeling very weak. Eating a low-fat diet but states he has lost over 20 pounds during the situation. Patient's medications, allergies, past medical, surgical, social and family histories werereviewed and updated as appropriate. Review of Systems   Constitutional: Positive for fatigue. Negative for chills, diaphoresis and fever. HENT: Negative for ear discharge, ear pain, facial swelling and nosebleeds. Eyes: Negative for pain, discharge and redness. Respiratory: Negative for cough, chest tightness, shortness of breath and wheezing. Cardiovascular: Negative for chest pain and palpitations. Gastrointestinal: Positive for abdominal distention, abdominal pain and nausea. Negative for constipation and diarrhea. Genitourinary: Negative for difficulty urinating, flank pain and hematuria. Musculoskeletal: Negative for neck pain and neck stiffness. Neurological: Positive for weakness. Negative for dizziness, numbness and headaches. Psychiatric/Behavioral: Negative for agitation, behavioral problems and self-injury. OBJECTIVE:  /70 (Site: Left Upper Arm, Position: Sitting, Cuff Size: Medium Adult)   Temp 97.6 °F (36.4 °C) (Temporal)   Ht 6' 1\" (1.854 m)   Wt 189 lb 9.6 oz (86 kg)   BMI 25.01 kg/m²   Physical Exam  Vitals reviewed. Constitutional:       Appearance: Normal appearance. HENT:      Head: Normocephalic and atraumatic. Right Ear: External ear normal.      Left Ear: External ear normal.      Nose: Nose normal.   Eyes:      Extraocular Movements: Extraocular movements intact.    Pulmonary:      Effort: Pulmonary effort is normal. No respiratory distress. Abdominal:      General: There is distension. Palpations: Abdomen is soft. Tenderness: There is no abdominal tenderness. There is no guarding or rebound. Musculoskeletal:         General: Normal range of motion. Cervical back: Normal range of motion. Skin:     General: Skin is warm and dry. Neurological:      General: No focal deficit present. Mental Status: He is alert and oriented to person, place, and time. Psychiatric:         Mood and Affect: Mood normal.         Behavior: Behavior normal.         Thought Content: Thought content normal.         ASSESSMENT:   Diagnosis Orders   1. Pancreatitis, gallstone     2. Acute recurrent pancreatitis         PLAN:  No orders of the defined types were placed in this encounter. No orders of the defined types were placed in this encounter. Plan to still remove the patient's gallbladder as an outpatient. Would like to wait till he gets his energy and nutrition levels back up prior to surgery. We will follow up in 2 weeks to again see how he is feeling and possibly schedule surgery. Return in about 2 weeks (around 4/14/2022).     Amanda Yeboah,  3/31/2022 1:50 PM

## 2022-04-01 ENCOUNTER — OFFICE VISIT (OUTPATIENT)
Dept: FAMILY MEDICINE CLINIC | Age: 72
End: 2022-04-01
Payer: MEDICARE

## 2022-04-01 VITALS
TEMPERATURE: 98.4 F | WEIGHT: 188 LBS | BODY MASS INDEX: 24.8 KG/M2 | OXYGEN SATURATION: 97 % | SYSTOLIC BLOOD PRESSURE: 104 MMHG | DIASTOLIC BLOOD PRESSURE: 74 MMHG | HEART RATE: 110 BPM

## 2022-04-01 DIAGNOSIS — K80.20 CALCULUS OF GALLBLADDER WITHOUT CHOLECYSTITIS WITHOUT OBSTRUCTION: Primary | ICD-10-CM

## 2022-04-01 DIAGNOSIS — N13.8 BPH WITH OBSTRUCTION/LOWER URINARY TRACT SYMPTOMS: ICD-10-CM

## 2022-04-01 DIAGNOSIS — R11.0 NAUSEA: ICD-10-CM

## 2022-04-01 DIAGNOSIS — R33.9 URINARY RETENTION: ICD-10-CM

## 2022-04-01 DIAGNOSIS — K85.10 PANCREATITIS, GALLSTONE: ICD-10-CM

## 2022-04-01 DIAGNOSIS — N40.1 BPH WITH OBSTRUCTION/LOWER URINARY TRACT SYMPTOMS: ICD-10-CM

## 2022-04-01 PROCEDURE — 99495 TRANSJ CARE MGMT MOD F2F 14D: CPT | Performed by: FAMILY MEDICINE

## 2022-04-01 RX ORDER — TAMSULOSIN HYDROCHLORIDE 0.4 MG/1
0.4 CAPSULE ORAL DAILY
Qty: 30 CAPSULE | Refills: 5
Start: 2022-04-01

## 2022-04-01 RX ORDER — PROMETHAZINE HYDROCHLORIDE 25 MG/1
25 TABLET ORAL 4 TIMES DAILY PRN
Qty: 12 TABLET | Refills: 0 | Status: SHIPPED | OUTPATIENT
Start: 2022-04-01 | End: 2022-08-12 | Stop reason: SDUPTHER

## 2022-04-01 ASSESSMENT — ENCOUNTER SYMPTOMS
VOMITING: 0
ABDOMINAL PAIN: 1
ABDOMINAL DISTENTION: 0
BACK PAIN: 0
NAUSEA: 0

## 2022-04-01 NOTE — PROGRESS NOTES
[facility] PHYSICIAN SERVICES  [facility]  [id number] [address] [address]  Dept: [phone]  Dept Fax: [phone]  Loc: [phone]    Matt Quezada is a 73 y.o. male who presents today for his medical conditions/complaints asnoted below. Matt Quezada is here for Follow-Up from [address] [place] date:  3/22/2022                      Discharge date: 3/26/22      Discharging Physician:  Dr. Gil End Directive: Full Code     Consults: IP CONSULT TO GI  IP CONSULT TO Emmie John      Primary Care Physician:  Augie Summers MD     Discharge Diagnoses:  Principal Problem:    Acute recurrent pancreatitis  Active Problems:    Urinary retention    Severe malnutrition ([address]  Resolved Problems:    * No resolved hospital problems. *        Inpatient course: Discharge summary reviewed- see chart for full details. 72-year-old with a history of metastatic renal carcinoma and recent pancreatitis presented to the emergency department complaining of abdominal pain. He initially came to emergency department have his Lemos catheter removed for urinary retention. While present for the Lemos catheter removal he stated he was having increased nausea. No fever chills shortness of breath or chest pain. His ALT was 55 lipase 215 and CT of abdomen showed acute pancreatitis with an area of walled off necrosis surrounding the pancreas. He had mesenteric vascular congestion and trace intraperitoneal free fluid which was secondary to mass-effect on the SMV and proximal main portal vein and cholelithiasis. Decision was made to admit him for management of pancreatitis. GI was consulted and he was placed on a clear liquid diet and IV hydration. Suggested he have an endoscopic ultrasound.   His esophagoduodenoscopy and endoscopic ultrasound showed findings of extrinsic compression most notable in the distal stomach. Suspicion for functional gastric outlet obstruction from concentric fluid collections. GI recommended PPI twice a day, clear liquid diet, and dietitian. GI recommended tube feedings to allow the pancreas to rest for at least the next 6 days from discharge. Social work was assisted in obtaining tube feeding materials. Dietitian left orders as well. He followed up with gastroenterology yesterday. They were discussing the timing of nasogastric tube removal.    He also followed up with general surgery yesterday. Plan was to perform a cholecystectomy once he has improved from the above issues. They are following up again in 2 weeks. [facility] Risk Score (risk of hospital readmission):Readmission Risk Score: 21.6 ( )    Active Problems:    * No active hospital problems. *      Care management risk score Rising risk (score 2-5) and Complex Care (Scores >=6): 0     Non face to face  following discharge, date last encounter closed (first attempt may havebeen earlier): 3/29/2022 10:31 AM    Call initiated 2 business days of discharge: Yes      HPI andCOURSE SINCE DISCHARGE   CC:  Here today to discuss thefollowing:    Regarding his BPH with lower urinary tract obstruction, he is voiding without difficulty. He has had his Lemos catheter removed. He denies any dysuria or hematuria. No fever chills. Regarding his gallstone pancreatitis, he denies any nausea vomiting or abdominal pain. He followed up with general surgery on March 31. The plan was to follow-up 2 weeks after that visit to discuss cholecystectomy. Plan was for him to improve his nutrition. He states has been taking p.o. adequately.         HPI    Past Medical History:   Diagnosis Date    Metastatic renal cell carcinoma (HCC)     Nasopharyngeal mass     Metastatic renal cell carcinoma, clear cell      Past Surgical History:   Procedure Laterality Date    ADRENALECTOMY Left 10/2017    COLONOSCOPY  12/09/2019    Dr Smith Sessions: Prolapsed Thrombosed Hemorrhoids    NOSE SURGERY  12/2017    Renal cell carcinoma, metastatic clear cell, April 2016, December 2017    PARTIAL NEPHRECTOMY Left 10/2017    t3a n0 m0    UPPER GASTROINTESTINAL ENDOSCOPY N/A 03/23/2022    Dr Hamm Sample (Dr Naveen Cruz pt) w/EUS and fna-Pancreatic fluid collections appearing to cause extrinsic compression on the distal stomach, placement of post pyloric DHT, duodenitis    UPPER GASTROINTESTINAL ENDOSCOPY N/A 03/23/2022    Dr Hamm Sample (Dr Naveen Cruz pt) w/EUS and fna-Pancreatic fluid collections appearing to cause extrinsic compression on the distal stomach, placement of post pyloric DHT, duodenitis       Family History   Problem Relation Age of Onset    Breast Cancer Mother     Breast Cancer Sister     Cancer Brother     Colon Cancer Neg Hx     Colon Polyps Neg Hx        Social History     Tobacco Use    Smoking status: Never Smoker    Smokeless tobacco: Never Used   Substance Use Topics    Alcohol use: No        Medications patient taking as of now reconciled against medications ordered at time of hospital discharge     Current Outpatient Medications   Medication Sig Dispense Refill    tamsulosin (FLOMAX) 0.4 MG capsule Take 1 capsule by mouth daily 30 capsule 5    pantoprazole (PROTONIX) 40 MG tablet Take 1 tablet by mouth 2 times daily (before meals) 60 tablet 0    cetirizine (ZYRTEC) 10 MG tablet Take 10 mg by mouth       No current facility-administered medications for this visit.      Allergies   Allergen Reactions    Nexium [Esomeprazole Magnesium] Rash    Prilosec [Omeprazole] Rash       Health Maintenance   Topic Date Due    DTaP/Tdap/Td vaccine (1 - Tdap) Never done    Colorectal Cancer Screen  Never done    COVID-19 Vaccine (3 - Booster for Pfizer series) 09/18/2021    Depression Screen  05/10/2022    Lipid screen  03/12/2026    Flu vaccine  Completed    Shingles Vaccine  Completed    Pneumococcal 65+ years Vaccine  Completed    Hepatitis C screen Completed    Hepatitis A vaccine  Aged Out    Hepatitis B vaccine  Aged Out    Hib vaccine  Aged Out    Meningococcal (ACWY) vaccine  Aged Out       Subjective:      Review of Systems  Review of Systems   Constitutional: Negative for chills and fever. HENT: Negative for congestion. Respiratory: Negative for cough, chest tightness and shortness of breath. Cardiovascular: Negative for chest pain, palpitations and leg swelling. Gastrointestinal: Negative for abdominal pain, anal bleeding, constipation, diarrhea and nausea. Genitourinary: Negative for difficulty urinating. Psychiatric/Behavioral: Negative. See HPI for visit specific review of symptoms. All others negative      Objective:   /74   Pulse 110   Temp 98.4 °F (36.9 °C)   Wt 188 lb (85.3 kg)   SpO2 97%   BMI 24.80 kg/m²   Physical Exam  Physical Exam   Constitutional: He appears well-developed. Does not appear ill. Neck: Normal range of motion. Neck supple. No masses. Neck Symmetric. Normal tracheal position. No thyroid enlargement  Cardiovascular: Normal rate and regular rhythm. Exam reveals no friction rub. Carotid arteries: no bruit observed. No murmur heard. Respiratory:  Effort normal and breath sounds normal. No respiratory distress. No wheezes. No rales. No use of accessory muscles or intercostal retractions. Neurological: alert. Psychiatric: normal mood and affect. His behavior is normal. Normal judgement and insight observed.       Recent Results (from the past 672 hour(s))   Urinalysis with Reflex to Culture    Collection Time: 03/18/22  7:22 AM    Specimen: Urine, clean catch   Result Value Ref Range    Color, UA YELLOW Straw/Yellow    Clarity, UA Clear Clear    Glucose, Ur Negative Negative mg/dL    Bilirubin Urine Negative Negative    Ketones, Urine Negative Negative mg/dL    Specific Gravity, UA 1.009 1.005 - 1.030    Blood, Urine Negative Negative    pH, UA 7.0 5.0 - 8.0    Protein, UA Negative Negative mg/dL    Urobilinogen, Urine 0.2 <2.0 E.U./dL    Nitrite, Urine Negative Negative    Leukocyte Esterase, Urine Negative Negative   CBC with Auto Differential    Collection Time: 03/18/22  7:22 AM   Result Value Ref Range    WBC 13.0 (H) 4.8 - 10.8 K/uL    RBC 4.24 (L) 4.70 - 6.10 M/uL    Hemoglobin 12.6 (L) 14.0 - 18.0 g/dL    Hematocrit 39.6 (L) 42.0 - 52.0 %    MCV 93.4 80.0 - 94.0 fL    MCH 29.7 27.0 - 31.0 pg    MCHC 31.8 (L) 33.0 - 37.0 g/dL    RDW 14.1 11.5 - 14.5 %    Platelets 626 (H) 854 - 400 K/uL    MPV 9.5 9.4 - 12.4 fL    Neutrophils % 70.9 (H) 50.0 - 65.0 %    Lymphocytes % 20.1 20.0 - 40.0 %    Monocytes % 7.3 0.0 - 10.0 %    Eosinophils % 0.8 0.0 - 5.0 %    Basophils % 0.5 0.0 - 1.0 %    Neutrophils Absolute 9.2 (H) 1.5 - 7.5 K/uL    Immature Granulocytes # 0.1 K/uL    Lymphocytes Absolute 2.6 1.1 - 4.5 K/uL    Monocytes Absolute 1.00 (H) 0.00 - 0.90 K/uL    Eosinophils Absolute 0.10 0.00 - 0.60 K/uL    Basophils Absolute 0.10 0.00 - 0.20 K/uL   SPECIMEN REJECTION    Collection Time: 03/18/22  8:02 AM   Result Value Ref Range    Rejected Test cmp lipase     Reason for Rejection see below    Comprehensive Metabolic Panel    Collection Time: 03/18/22  8:10 AM   Result Value Ref Range    Sodium 135 (L) 136 - 145 mmol/L    Potassium 4.1 3.5 - 5.0 mmol/L    Chloride 100 98 - 111 mmol/L    CO2 21 (L) 22 - 29 mmol/L    Anion Gap 14 7 - 19 mmol/L    Glucose 146 (H) 74 - 109 mg/dL    BUN 13 8 - 23 mg/dL    CREATININE 0.8 0.5 - 1.2 mg/dL    GFR Non-African American >60 >60    GFR African American >59 >59    Calcium 8.6 (L) 8.8 - 10.2 mg/dL    Total Protein 5.8 (L) 6.6 - 8.7 g/dL    Albumin 3.4 (L) 3.5 - 5.2 g/dL    Total Bilirubin 0.6 0.2 - 1.2 mg/dL    Alkaline Phosphatase 88 40 - 130 U/L    ALT 49 (H) 5 - 41 U/L    AST 29 5 - 40 U/L   Lipase    Collection Time: 03/18/22  8:10 AM   Result Value Ref Range    Lipase 163 (H) 13 - 60 U/L   Comprehensive Metabolic Panel    Collection Time: 03/22/22  7:28 AM   Result Value Ref Range    Sodium 136 136 - 145 mmol/L    Potassium 4.5 3.5 - 5.0 mmol/L    Chloride 97 (L) 98 - 111 mmol/L    CO2 23 22 - 29 mmol/L    Anion Gap 16 7 - 19 mmol/L    Glucose 127 (H) 74 - 109 mg/dL    BUN 18 8 - 23 mg/dL    CREATININE 0.9 0.5 - 1.2 mg/dL    GFR Non-African American >60 >60    GFR African American >59 >59    Calcium 9.9 8.8 - 10.2 mg/dL    Total Protein 6.5 (L) 6.6 - 8.7 g/dL    Albumin 3.8 3.5 - 5.2 g/dL    Total Bilirubin 0.6 0.2 - 1.2 mg/dL    Alkaline Phosphatase 112 40 - 130 U/L    ALT 55 (H) 5 - 41 U/L    AST 38 5 - 40 U/L   CBC with Auto Differential    Collection Time: 03/22/22  7:28 AM   Result Value Ref Range    WBC 8.4 4.8 - 10.8 K/uL    RBC 4.35 (L) 4.70 - 6.10 M/uL    Hemoglobin 12.9 (L) 14.0 - 18.0 g/dL    Hematocrit 40.6 (L) 42.0 - 52.0 %    MCV 93.3 80.0 - 94.0 fL    MCH 29.7 27.0 - 31.0 pg    MCHC 31.8 (L) 33.0 - 37.0 g/dL    RDW 13.9 11.5 - 14.5 %    Platelets 730 056 - 558 K/uL    MPV 10.0 9.4 - 12.4 fL    Neutrophils % 62.2 50.0 - 65.0 %    Lymphocytes % 25.3 20.0 - 40.0 %    Monocytes % 8.8 0.0 - 10.0 %    Eosinophils % 2.6 0.0 - 5.0 %    Basophils % 0.7 0.0 - 1.0 %    Neutrophils Absolute 5.3 1.5 - 7.5 K/uL    Immature Granulocytes # 0.0 K/uL    Lymphocytes Absolute 2.1 1.1 - 4.5 K/uL    Monocytes Absolute 0.70 0.00 - 0.90 K/uL    Eosinophils Absolute 0.20 0.00 - 0.60 K/uL    Basophils Absolute 0.10 0.00 - 0.20 K/uL   Lipase    Collection Time: 03/22/22  7:28 AM   Result Value Ref Range    Lipase 215 (H) 13 - 60 U/L   Protime-INR    Collection Time: 03/22/22  7:28 AM   Result Value Ref Range    Protime 14.9 (H) 12.0 - 14.6 sec    INR 1.16 0.88 - 1.18   EKG 12 Lead    Collection Time: 03/22/22  7:55 AM   Result Value Ref Range    P-R Interval 132 ms    QRS Duration 78 ms    Q-T Interval 316 ms    QTc Calculation (Bazett) 394 ms    P Axis 62 degrees    T Axis 62 degrees   COVID-19, Rapid    Collection Time: 03/22/22 10:20 AM    Specimen: Nasopharyngeal Swab Result Value Ref Range    SARS-CoV-2, NAAT Not Detected Not Detected   CBC with Auto Differential    Collection Time: 03/23/22  3:01 AM   Result Value Ref Range    WBC 7.1 4.8 - 10.8 K/uL    RBC 3.59 (L) 4.70 - 6.10 M/uL    Hemoglobin 10.7 (L) 14.0 - 18.0 g/dL    Hematocrit 34.0 (L) 42.0 - 52.0 %    MCV 94.7 (H) 80.0 - 94.0 fL    MCH 29.8 27.0 - 31.0 pg    MCHC 31.5 (L) 33.0 - 37.0 g/dL    RDW 13.8 11.5 - 14.5 %    Platelets 952 553 - 698 K/uL    MPV 10.2 9.4 - 12.4 fL    Neutrophils % 73.9 (H) 50.0 - 65.0 %    Lymphocytes % 14.5 (L) 20.0 - 40.0 %    Monocytes % 7.8 0.0 - 10.0 %    Eosinophils % 2.7 0.0 - 5.0 %    Basophils % 0.7 0.0 - 1.0 %    Neutrophils Absolute 5.2 1.5 - 7.5 K/uL    Immature Granulocytes # 0.0 K/uL    Lymphocytes Absolute 1.0 (L) 1.1 - 4.5 K/uL    Monocytes Absolute 0.60 0.00 - 0.90 K/uL    Eosinophils Absolute 0.20 0.00 - 0.60 K/uL    Basophils Absolute 0.10 0.00 - 0.20 K/uL   Triglyceride    Collection Time: 03/23/22  3:02 AM   Result Value Ref Range    Triglycerides 89 0 - 149 mg/dL   Comprehensive Metabolic Panel w/ Reflex to MG    Collection Time: 03/23/22  3:02 AM   Result Value Ref Range    Sodium 136 136 - 145 mmol/L    Potassium reflex Magnesium 4.7 3.5 - 5.0 mmol/L    Chloride 99 98 - 111 mmol/L    CO2 23 22 - 29 mmol/L    Anion Gap 14 7 - 19 mmol/L    Glucose 102 74 - 109 mg/dL    BUN 20 8 - 23 mg/dL    CREATININE 0.8 0.5 - 1.2 mg/dL    GFR Non-African American >60 >60    GFR African American >59 >59    Calcium 9.9 8.8 - 10.2 mg/dL    Total Protein 5.3 (L) 6.6 - 8.7 g/dL    Albumin 3.1 (L) 3.5 - 5.2 g/dL    Total Bilirubin 0.6 0.2 - 1.2 mg/dL    Alkaline Phosphatase 92 40 - 130 U/L    ALT 38 5 - 41 U/L    AST 27 5 - 40 U/L   Lipase    Collection Time: 03/23/22  3:02 AM   Result Value Ref Range    Lipase 291 (H) 13 - 60 U/L   Amylase    Collection Time: 03/23/22  3:02 AM   Result Value Ref Range    Amylase 512 (H) 28 - 100 U/L   Culture, Body Fluid    Collection Time: 03/23/22 10:59 AM    Specimen: Pancreas;  Body Fluid   Result Value Ref Range    Body Fluid Culture, Sterile No growth 4 days     Gram Stain Result No WBCs or organisms seen     Anaerobic Culture No anaerobes isolated  4 days    Comprehensive Metabolic Panel w/ Reflex to MG    Collection Time: 03/24/22  4:33 AM   Result Value Ref Range    Sodium 136 136 - 145 mmol/L    Potassium reflex Magnesium 4.4 3.5 - 5.0 mmol/L    Chloride 102 98 - 111 mmol/L    CO2 25 22 - 29 mmol/L    Anion Gap 9 7 - 19 mmol/L    Glucose 112 (H) 74 - 109 mg/dL    BUN 15 8 - 23 mg/dL    CREATININE 0.7 0.5 - 1.2 mg/dL    GFR Non-African American >60 >60    GFR African American >59 >59    Calcium 8.6 (L) 8.8 - 10.2 mg/dL    Total Protein 4.7 (L) 6.6 - 8.7 g/dL    Albumin 2.6 (L) 3.5 - 5.2 g/dL    Total Bilirubin 0.5 0.2 - 1.2 mg/dL    Alkaline Phosphatase 79 40 - 130 U/L    ALT 29 5 - 41 U/L    AST 21 5 - 40 U/L   CBC with Auto Differential    Collection Time: 03/24/22  4:33 AM   Result Value Ref Range    WBC 5.1 4.8 - 10.8 K/uL    RBC 3.32 (L) 4.70 - 6.10 M/uL    Hemoglobin 9.8 (L) 14.0 - 18.0 g/dL    Hematocrit 31.8 (L) 42.0 - 52.0 %    MCV 95.8 (H) 80.0 - 94.0 fL    MCH 29.5 27.0 - 31.0 pg    MCHC 30.8 (L) 33.0 - 37.0 g/dL    RDW 13.8 11.5 - 14.5 %    Platelets 080 103 - 479 K/uL    MPV 10.0 9.4 - 12.4 fL    Neutrophils % 62.9 50.0 - 65.0 %    Lymphocytes % 22.4 20.0 - 40.0 %    Monocytes % 9.3 0.0 - 10.0 %    Eosinophils % 4.8 0.0 - 5.0 %    Basophils % 0.2 0.0 - 1.0 %    Neutrophils Absolute 3.2 1.5 - 7.5 K/uL    Immature Granulocytes # 0.0 K/uL    Lymphocytes Absolute 1.1 1.1 - 4.5 K/uL    Monocytes Absolute 0.50 0.00 - 0.90 K/uL    Eosinophils Absolute 0.20 0.00 - 0.60 K/uL    Basophils Absolute 0.00 0.00 - 0.20 K/uL   Comprehensive Metabolic Panel w/ Reflex to MG    Collection Time: 03/25/22  3:51 AM   Result Value Ref Range    Sodium 140 136 - 145 mmol/L    Potassium reflex Magnesium 4.0 3.5 - 5.0 mmol/L    Chloride 104 98 - 111 mmol/L    CO2 25 22 - 29 mmol/L    Anion Gap 11 7 - 19 mmol/L    Glucose 134 (H) 74 - 109 mg/dL    BUN 9 8 - 23 mg/dL    CREATININE 0.6 0.5 - 1.2 mg/dL    GFR Non-African American >60 >60    GFR African American >59 >59    Calcium 8.6 (L) 8.8 - 10.2 mg/dL    Total Protein 5.0 (L) 6.6 - 8.7 g/dL    Albumin 2.9 (L) 3.5 - 5.2 g/dL    Total Bilirubin 0.4 0.2 - 1.2 mg/dL    Alkaline Phosphatase 81 40 - 130 U/L    ALT 27 5 - 41 U/L    AST 20 5 - 40 U/L   CBC with Auto Differential    Collection Time: 03/25/22  3:51 AM   Result Value Ref Range    WBC 4.9 4.8 - 10.8 K/uL    RBC 3.34 (L) 4.70 - 6.10 M/uL    Hemoglobin 9.9 (L) 14.0 - 18.0 g/dL    Hematocrit 31.6 (L) 42.0 - 52.0 %    MCV 94.6 (H) 80.0 - 94.0 fL    MCH 29.6 27.0 - 31.0 pg    MCHC 31.3 (L) 33.0 - 37.0 g/dL    RDW 13.6 11.5 - 14.5 %    Platelets 138 060 - 540 K/uL    MPV 10.5 9.4 - 12.4 fL    Neutrophils % 61.9 50.0 - 65.0 %    Lymphocytes % 24.8 20.0 - 40.0 %    Monocytes % 8.2 0.0 - 10.0 %    Eosinophils % 4.3 0.0 - 5.0 %    Basophils % 0.4 0.0 - 1.0 %    Neutrophils Absolute 3.0 1.5 - 7.5 K/uL    Immature Granulocytes # 0.0 K/uL    Lymphocytes Absolute 1.2 1.1 - 4.5 K/uL    Monocytes Absolute 0.40 0.00 - 0.90 K/uL    Eosinophils Absolute 0.20 0.00 - 0.60 K/uL    Basophils Absolute 0.00 0.00 - 0.20 K/uL   Comprehensive Metabolic Panel w/ Reflex to MG    Collection Time: 03/26/22  3:40 AM   Result Value Ref Range    Sodium 140 136 - 145 mmol/L    Potassium reflex Magnesium 3.8 3.5 - 5.0 mmol/L    Chloride 106 98 - 111 mmol/L    CO2 23 22 - 29 mmol/L    Anion Gap 11 7 - 19 mmol/L    Glucose 143 (H) 74 - 109 mg/dL    BUN 8 8 - 23 mg/dL    CREATININE 0.6 0.5 - 1.2 mg/dL    GFR Non-African American >60 >60    GFR African American >59 >59    Calcium 8.5 (L) 8.8 - 10.2 mg/dL    Total Protein 5.0 (L) 6.6 - 8.7 g/dL    Albumin 3.0 (L) 3.5 - 5.2 g/dL    Total Bilirubin 0.5 0.2 - 1.2 mg/dL    Alkaline Phosphatase 82 40 - 130 U/L    ALT 24 5 - 41 U/L    AST 19 5 - 40 U/L   CBC with Auto Differential    Collection Time: 03/26/22  3:40 AM   Result Value Ref Range    WBC 5.3 4.8 - 10.8 K/uL    RBC 3.39 (L) 4.70 - 6.10 M/uL    Hemoglobin 10.3 (L) 14.0 - 18.0 g/dL    Hematocrit 31.7 (L) 42.0 - 52.0 %    MCV 93.5 80.0 - 94.0 fL    MCH 30.4 27.0 - 31.0 pg    MCHC 32.5 (L) 33.0 - 37.0 g/dL    RDW 13.3 11.5 - 14.5 %    Platelets 245 740 - 748 K/uL    MPV 9.6 9.4 - 12.4 fL    Neutrophils % 63.0 50.0 - 65.0 %    Lymphocytes % 24.3 20.0 - 40.0 %    Monocytes % 7.4 0.0 - 10.0 %    Eosinophils % 4.5 0.0 - 5.0 %    Basophils % 0.4 0.0 - 1.0 %    Neutrophils Absolute 3.3 1.5 - 7.5 K/uL    Immature Granulocytes # 0.0 K/uL    Lymphocytes Absolute 1.3 1.1 - 4.5 K/uL    Monocytes Absolute 0.40 0.00 - 0.90 K/uL    Eosinophils Absolute 0.20 0.00 - 0.60 K/uL    Basophils Absolute 0.00 0.00 - 0.20 K/uL   Hepatic Function Panel    Collection Time: 03/31/22  8:25 AM   Result Value Ref Range    Total Protein 6.7 6.6 - 8.7 g/dL    Albumin 4.1 3.5 - 5.2 g/dL    Alkaline Phosphatase 105 40 - 130 U/L    ALT 31 5 - 41 U/L    AST 24 5 - 40 U/L    Total Bilirubin 0.5 0.2 - 1.2 mg/dL    Bilirubin, Direct 0.2 0.0 - 0.3 mg/dL    Bilirubin, Indirect 0.3 0.1 - 1.0 mg/dL   Lipase    Collection Time: 03/31/22  8:25 AM   Result Value Ref Range    Lipase 192 (H) 13 - 60 U/L       Assessment & Plan: The following diagnoses and conditions are stable with no further orders unless indicated:  1. Pancreatitis, gallstone  2. Calculus of gallbladder without cholecystitis without obstruction  He has a follow-up appointment with general surgery on April 14  Currently asymptomatic. No abdominal pain, nausea, or vomiting. Taking adequate p.o.    3. BPH with obstruction/lower urinary tract symptoms  Stable  Refilled his Flomax  - tamsulosin (FLOMAX) 0.4 MG capsule; Take 1 capsule by mouth daily  Dispense: 30 capsule; Refill: 5    4.  Urinary retention  No issues with urinary retention refilled his Flomax  He has an appointment with urology on April 22  - tamsulosin (FLOMAX) 0.4 MG capsule; Take 1 capsule by mouth daily  Dispense: 30 capsule; Refill: 5    5. Nausea  Refilled his Phenergan  - promethazine (PHENERGAN) 25 MG tablet; Take 1 tablet by mouth 4 times daily as needed for Nausea  Dispense: 12 tablet; Refill: 0      Diagnostic test results reviewed. Medical Decision Making: moderate complexity    Return if symptoms worsen or fail to improve.

## 2022-04-01 NOTE — PROGRESS NOTES
[facility] PHYSICIAN SERVICES  [facility] FAMILY MEDICINE  [id number] [place] [address] [address] [address]  Dept: [phone]  Dept Fax: [phone]  Loc: [phone]    Tor Askew is a 73 y.o. male who presents today for his medical conditions/complaints as noted below. Tor Askew is here for No chief complaint on file. HPI:   CC: Here today to discuss the following:    ***        HPI    Subjective:      Review of Systems      [facility] for visit specific review of symptoms. All others negative      Objective: There were no vitals taken for this visit.   Physical Exam      Recent Results (from the past 672 hour(s))   CBC with Auto Differential    Collection Time: 03/05/22  3:43 AM   Result Value Ref Range    WBC 15.4 (H) 4.8 - 10.8 K/uL    RBC 4.26 (L) 4.70 - 6.10 M/uL    Hemoglobin 12.9 (L) 14.0 - 18.0 g/dL    Hematocrit 40.1 (L) 42.0 - 52.0 %    MCV 94.1 (H) 80.0 - 94.0 fL    MCH 30.3 27.0 - 31.0 pg    MCHC 32.2 (L) 33.0 - 37.0 g/dL    RDW 14.5 11.5 - 14.5 %    Platelets 145 530 - 183 K/uL    MPV 10.3 9.4 - 12.4 fL    Neutrophils % 83.1 (H) 50.0 - 65.0 %    Lymphocytes % 5.6 (L) 20.0 - 40.0 %    Monocytes % 10.2 (H) 0.0 - 10.0 %    Eosinophils % 0.2 0.0 - 5.0 %    Basophils % 0.2 0.0 - 1.0 %    Neutrophils Absolute 12.8 (H) 1.5 - 7.5 K/uL    Immature Granulocytes # 0.1 K/uL    Lymphocytes Absolute 0.9 (L) 1.1 - 4.5 K/uL    Monocytes Absolute 1.60 (H) 0.00 - 0.90 K/uL    Eosinophils Absolute 0.00 0.00 - 0.60 K/uL    Basophils Absolute 0.00 0.00 - 0.20 K/uL   Comprehensive Metabolic Panel w/ Reflex to MG    Collection Time: 03/05/22  3:43 AM   Result Value Ref Range    Sodium 141 136 - 145 mmol/L    Potassium reflex Magnesium 3.7 3.5 - 5.0 mmol/L    Chloride 101 98 - 111 mmol/L    CO2 26 22 - 29 mmol/L    Anion Gap 14 7 - 19 mmol/L    Glucose 170 (H) 74 - 109 mg/dL    BUN 33 (H) 8 - 23 mg/dL    CREATININE 1.0 0.5 - 1.2 mg/dL    GFR Non-African American >60 >60    GFR  >59 >59 Calcium 8.2 (L) 8.8 - 10.2 mg/dL    Total Protein 5.4 (L) 6.6 - 8.7 g/dL    Albumin 3.2 (L) 3.5 - 5.2 g/dL    Total Bilirubin 1.1 0.2 - 1.2 mg/dL    Alkaline Phosphatase 66 40 - 130 U/L    ALT 15 5 - 41 U/L    AST 20 5 - 40 U/L   Lipase    Collection Time: 03/05/22  3:43 AM   Result Value Ref Range    Lipase 38 13 - 60 U/L   Lactate Dehydrogenase    Collection Time: 03/05/22  3:43 AM   Result Value Ref Range     (H) 91 - 215 U/L   CBC with Auto Differential    Collection Time: 03/06/22  2:24 AM   Result Value Ref Range    WBC 14.6 (H) 4.8 - 10.8 K/uL    RBC 4.01 (L) 4.70 - 6.10 M/uL    Hemoglobin 12.4 (L) 14.0 - 18.0 g/dL    Hematocrit 37.3 (L) 42.0 - 52.0 %    MCV 93.0 80.0 - 94.0 fL    MCH 30.9 27.0 - 31.0 pg    MCHC 33.2 33.0 - 37.0 g/dL    RDW 14.4 11.5 - 14.5 %    Platelets 618 967 - 523 K/uL    MPV 9.8 9.4 - 12.4 fL    Neutrophils % 76.6 (H) 50.0 - 65.0 %    Lymphocytes % 9.1 (L) 20.0 - 40.0 %    Monocytes % 12.4 (H) 0.0 - 10.0 %    Eosinophils % 0.3 0.0 - 5.0 %    Basophils % 0.2 0.0 - 1.0 %    Neutrophils Absolute 11.2 (H) 1.5 - 7.5 K/uL    Immature Granulocytes # 0.2 K/uL    Lymphocytes Absolute 1.3 1.1 - 4.5 K/uL    Monocytes Absolute 1.80 (H) 0.00 - 0.90 K/uL    Eosinophils Absolute 0.00 0.00 - 0.60 K/uL    Basophils Absolute 0.00 0.00 - 0.20 K/uL   Comprehensive Metabolic Panel w/ Reflex to MG    Collection Time: 03/06/22  2:24 AM   Result Value Ref Range    Sodium 140 136 - 145 mmol/L    Potassium reflex Magnesium 3.4 (L) 3.5 - 5.0 mmol/L    Chloride 102 98 - 111 mmol/L    CO2 24 22 - 29 mmol/L    Anion Gap 14 7 - 19 mmol/L    Glucose 156 (H) 74 - 109 mg/dL    BUN 24 (H) 8 - 23 mg/dL    CREATININE 0.9 0.5 - 1.2 mg/dL    GFR Non-African American >60 >60    GFR African American >59 >59    Calcium 7.3 (L) 8.8 - 10.2 mg/dL    Total Protein 5.2 (L) 6.6 - 8.7 g/dL    Albumin 3.2 (L) 3.5 - 5.2 g/dL    Total Bilirubin 1.4 (H) 0.2 - 1.2 mg/dL    Alkaline Phosphatase 70 40 - 130 U/L    ALT 17 5 - 41 U/L AST 24 5 - 40 U/L   Lipase    Collection Time: 03/06/22  2:24 AM   Result Value Ref Range    Lipase 31 13 - 60 U/L   Magnesium    Collection Time: 03/06/22  2:24 AM   Result Value Ref Range    Magnesium 2.7 (H) 1.6 - 2.4 mg/dL   CBC with Auto Differential    Collection Time: 03/07/22  4:29 AM   Result Value Ref Range    WBC 14.5 (H) 4.8 - 10.8 K/uL    RBC 3.86 (L) 4.70 - 6.10 M/uL    Hemoglobin 11.8 (L) 14.0 - 18.0 g/dL    Hematocrit 35.6 (L) 42.0 - 52.0 %    MCV 92.2 80.0 - 94.0 fL    MCH 30.6 27.0 - 31.0 pg    MCHC 33.1 33.0 - 37.0 g/dL    RDW 14.6 (H) 11.5 - 14.5 %    Platelets 054 742 - 800 K/uL    MPV 10.1 9.4 - 12.4 fL    Neutrophils % 78.2 (H) 50.0 - 65.0 %    Lymphocytes % 8.2 (L) 20.0 - 40.0 %    Monocytes % 11.3 (H) 0.0 - 10.0 %    Eosinophils % 0.7 0.0 - 5.0 %    Basophils % 0.2 0.0 - 1.0 %    Neutrophils Absolute 11.3 (H) 1.5 - 7.5 K/uL    Immature Granulocytes # 0.2 K/uL    Lymphocytes Absolute 1.2 1.1 - 4.5 K/uL    Monocytes Absolute 1.60 (H) 0.00 - 0.90 K/uL    Eosinophils Absolute 0.10 0.00 - 0.60 K/uL    Basophils Absolute 0.00 0.00 - 0.20 K/uL   Comprehensive Metabolic Panel w/ Reflex to MG    Collection Time: 03/07/22  4:29 AM   Result Value Ref Range    Sodium 140 136 - 145 mmol/L    Potassium reflex Magnesium 3.3 (L) 3.5 - 5.0 mmol/L    Chloride 103 98 - 111 mmol/L    CO2 25 22 - 29 mmol/L    Anion Gap 12 7 - 19 mmol/L    Glucose 152 (H) 74 - 109 mg/dL    BUN 15 8 - 23 mg/dL    CREATININE 0.8 0.5 - 1.2 mg/dL    GFR Non-African American >60 >60    GFR African American >59 >59    Calcium 7.3 (L) 8.8 - 10.2 mg/dL    Total Protein 4.8 (L) 6.6 - 8.7 g/dL    Albumin 2.9 (L) 3.5 - 5.2 g/dL    Total Bilirubin 1.2 0.2 - 1.2 mg/dL    Alkaline Phosphatase 64 40 - 130 U/L    ALT 17 5 - 41 U/L    AST 23 5 - 40 U/L   Lipase    Collection Time: 03/07/22  4:29 AM   Result Value Ref Range    Lipase 26 13 - 60 U/L   Magnesium    Collection Time: 03/07/22  4:29 AM   Result Value Ref Range    Magnesium 2.5 (H) 1.6 - 2.4 mg/dL   CBC with Auto Differential    Collection Time: 03/08/22  3:27 AM   Result Value Ref Range    WBC 16.2 (H) 4.8 - 10.8 K/uL    RBC 3.61 (L) 4.70 - 6.10 M/uL    Hemoglobin 11.0 (L) 14.0 - 18.0 g/dL    Hematocrit 33.7 (L) 42.0 - 52.0 %    MCV 93.4 80.0 - 94.0 fL    MCH 30.5 27.0 - 31.0 pg    MCHC 32.6 (L) 33.0 - 37.0 g/dL    RDW 14.5 11.5 - 14.5 %    Platelets 359 375 - 283 K/uL    MPV 10.3 9.4 - 12.4 fL    Neutrophils % 82.2 (H) 50.0 - 65.0 %    Lymphocytes % 7.7 (L) 20.0 - 40.0 %    Monocytes % 8.1 0.0 - 10.0 %    Eosinophils % 0.4 0.0 - 5.0 %    Basophils % 0.2 0.0 - 1.0 %    Neutrophils Absolute 13.4 (H) 1.5 - 7.5 K/uL    Immature Granulocytes # 0.2 K/uL    Lymphocytes Absolute 1.3 1.1 - 4.5 K/uL    Monocytes Absolute 1.30 (H) 0.00 - 0.90 K/uL    Eosinophils Absolute 0.10 0.00 - 0.60 K/uL    Basophils Absolute 0.00 0.00 - 0.20 K/uL   Comprehensive Metabolic Panel w/ Reflex to MG    Collection Time: 03/08/22  3:27 AM   Result Value Ref Range    Sodium 139 136 - 145 mmol/L    Potassium reflex Magnesium 3.6 3.5 - 5.0 mmol/L    Chloride 103 98 - 111 mmol/L    CO2 22 22 - 29 mmol/L    Anion Gap 14 7 - 19 mmol/L    Glucose 172 (H) 74 - 109 mg/dL    BUN 13 8 - 23 mg/dL    CREATININE 0.8 0.5 - 1.2 mg/dL    GFR Non-African American >60 >60    GFR African American >59 >59    Calcium 7.3 (L) 8.8 - 10.2 mg/dL    Total Protein 4.7 (L) 6.6 - 8.7 g/dL    Albumin 2.8 (L) 3.5 - 5.2 g/dL    Total Bilirubin 0.9 0.2 - 1.2 mg/dL    Alkaline Phosphatase 67 40 - 130 U/L    ALT 19 5 - 41 U/L    AST 25 5 - 40 U/L   Lipase    Collection Time: 03/08/22  3:27 AM   Result Value Ref Range    Lipase 29 13 - 60 U/L   CBC with Auto Differential    Collection Time: 03/09/22  3:13 AM   Result Value Ref Range    WBC 19.4 (H) 4.8 - 10.8 K/uL    RBC 3.68 (L) 4.70 - 6.10 M/uL    Hemoglobin 11.3 (L) 14.0 - 18.0 g/dL    Hematocrit 34.6 (L) 42.0 - 52.0 %    MCV 94.0 80.0 - 94.0 fL    MCH 30.7 27.0 - 31.0 pg    MCHC 32.7 (L) 33.0 - 37.0 g/dL    RDW 14.5 11.5 - 14.5 %    Platelets 821 052 - 545 K/uL    MPV 10.6 9.4 - 12.4 fL    Neutrophils % 83.2 (H) 50.0 - 65.0 %    Lymphocytes % 7.8 (L) 20.0 - 40.0 %    Monocytes % 6.8 0.0 - 10.0 %    Eosinophils % 0.5 0.0 - 5.0 %    Basophils % 0.3 0.0 - 1.0 %    Neutrophils Absolute 16.2 (H) 1.5 - 7.5 K/uL    Immature Granulocytes # 0.3 K/uL    Lymphocytes Absolute 1.5 1.1 - 4.5 K/uL    Monocytes Absolute 1.30 (H) 0.00 - 0.90 K/uL    Eosinophils Absolute 0.10 0.00 - 0.60 K/uL    Basophils Absolute 0.10 0.00 - 0.20 K/uL   Comprehensive Metabolic Panel w/ Reflex to MG    Collection Time: 03/09/22  3:13 AM   Result Value Ref Range    Sodium 137 136 - 145 mmol/L    Potassium reflex Magnesium 3.3 (L) 3.5 - 5.0 mmol/L    Chloride 100 98 - 111 mmol/L    CO2 23 22 - 29 mmol/L    Anion Gap 14 7 - 19 mmol/L    Glucose 151 (H) 74 - 109 mg/dL    BUN 11 8 - 23 mg/dL    CREATININE 0.9 0.5 - 1.2 mg/dL    GFR Non-African American >60 >60    GFR African American >59 >59    Calcium 7.4 (L) 8.8 - 10.2 mg/dL    Total Protein 4.9 (L) 6.6 - 8.7 g/dL    Albumin 2.9 (L) 3.5 - 5.2 g/dL    Total Bilirubin 1.0 0.2 - 1.2 mg/dL    Alkaline Phosphatase 81 40 - 130 U/L    ALT 24 5 - 41 U/L    AST 30 5 - 40 U/L   Lipase    Collection Time: 03/09/22  3:13 AM   Result Value Ref Range    Lipase 30 13 - 60 U/L   Magnesium    Collection Time: 03/09/22  3:13 AM   Result Value Ref Range    Magnesium 2.3 1.6 - 2.4 mg/dL   Lactic Acid    Collection Time: 03/09/22 11:05 AM   Result Value Ref Range    Lactic Acid 1.2 0.5 - 1.9 mmol/L   CBC with Auto Differential    Collection Time: 03/10/22  2:57 AM   Result Value Ref Range    WBC 17.0 (H) 4.8 - 10.8 K/uL    RBC 3.63 (L) 4.70 - 6.10 M/uL    Hemoglobin 11.1 (L) 14.0 - 18.0 g/dL    Hematocrit 34.0 (L) 42.0 - 52.0 %    MCV 93.7 80.0 - 94.0 fL    MCH 30.6 27.0 - 31.0 pg    MCHC 32.6 (L) 33.0 - 37.0 g/dL    RDW 14.5 11.5 - 14.5 %    Platelets 704 828 - 298 K/uL    MPV 10.7 9.4 - 12.4 fL    Neutrophils % 83.3 (H) 50.0 - 65.0 %    Lymphocytes % 7.6 (L) 20.0 - 40.0 %    Monocytes % 7.1 0.0 - 10.0 %    Eosinophils % 0.7 0.0 - 5.0 %    Basophils % 0.2 0.0 - 1.0 %    Neutrophils Absolute 14.2 (H) 1.5 - 7.5 K/uL    Immature Granulocytes # 0.2 K/uL    Lymphocytes Absolute 1.3 1.1 - 4.5 K/uL    Monocytes Absolute 1.20 (H) 0.00 - 0.90 K/uL    Eosinophils Absolute 0.10 0.00 - 0.60 K/uL    Basophils Absolute 0.00 0.00 - 0.20 K/uL   Comprehensive Metabolic Panel w/ Reflex to MG    Collection Time: 03/10/22  2:57 AM   Result Value Ref Range    Sodium 134 (L) 136 - 145 mmol/L    Potassium reflex Magnesium 3.1 (L) 3.5 - 5.0 mmol/L    Chloride 101 98 - 111 mmol/L    CO2 22 22 - 29 mmol/L    Anion Gap 11 7 - 19 mmol/L    Glucose 138 (H) 74 - 109 mg/dL    BUN 10 8 - 23 mg/dL    CREATININE 0.7 0.5 - 1.2 mg/dL    GFR Non-African American >60 >60    GFR African American >59 >59    Calcium 7.6 (L) 8.8 - 10.2 mg/dL    Total Protein 5.2 (L) 6.6 - 8.7 g/dL    Albumin 2.5 (L) 3.5 - 5.2 g/dL    Total Bilirubin 0.7 0.2 - 1.2 mg/dL    Alkaline Phosphatase 72 40 - 130 U/L    ALT 25 5 - 41 U/L    AST 30 5 - 40 U/L   Lipase    Collection Time: 03/10/22  2:57 AM   Result Value Ref Range    Lipase 27 13 - 60 U/L   Magnesium    Collection Time: 03/10/22  2:57 AM   Result Value Ref Range    Magnesium 2.3 1.6 - 2.4 mg/dL   CBC with Auto Differential    Collection Time: 03/11/22  3:39 AM   Result Value Ref Range    WBC 12.4 (H) 4.8 - 10.8 K/uL    RBC 3.39 (L) 4.70 - 6.10 M/uL    Hemoglobin 10.2 (L) 14.0 - 18.0 g/dL    Hematocrit 32.1 (L) 42.0 - 52.0 %    MCV 94.7 (H) 80.0 - 94.0 fL    MCH 30.1 27.0 - 31.0 pg    MCHC 31.8 (L) 33.0 - 37.0 g/dL    RDW 14.5 11.5 - 14.5 %    Platelets 960 134 - 319 K/uL    MPV 10.2 9.4 - 12.4 fL    Neutrophils % 79.5 (H) 50.0 - 65.0 %    Lymphocytes % 11.3 (L) 20.0 - 40.0 %    Monocytes % 7.5 0.0 - 10.0 %    Eosinophils % 0.6 0.0 - 5.0 %    Basophils % 0.2 0.0 - 1.0 %    Neutrophils Absolute 9.9 (H) 1.5 - 7.5 K/uL    Immature Granulocytes # 0.1 K/uL    Lymphocytes Absolute 1.4 1.1 - 4.5 K/uL    Monocytes Absolute 0.90 0.00 - 0.90 K/uL    Eosinophils Absolute 0.10 0.00 - 0.60 K/uL    Basophils Absolute 0.00 0.00 - 0.20 K/uL   Comprehensive Metabolic Panel w/ Reflex to MG    Collection Time: 03/11/22  3:39 AM   Result Value Ref Range    Sodium 140 136 - 145 mmol/L    Potassium reflex Magnesium 3.6 3.5 - 5.0 mmol/L    Chloride 108 98 - 111 mmol/L    CO2 22 22 - 29 mmol/L    Anion Gap 10 7 - 19 mmol/L    Glucose 137 (H) 74 - 109 mg/dL    BUN 8 8 - 23 mg/dL    CREATININE 0.7 0.5 - 1.2 mg/dL    GFR Non-African American >60 >60    GFR African American >59 >59    Calcium 7.3 (L) 8.8 - 10.2 mg/dL    Total Protein 4.2 (L) 6.6 - 8.7 g/dL    Albumin 2.4 (L) 3.5 - 5.2 g/dL    Total Bilirubin 0.4 0.2 - 1.2 mg/dL    Alkaline Phosphatase 67 40 - 130 U/L    ALT 30 5 - 41 U/L    AST 38 5 - 40 U/L   Lipase    Collection Time: 03/11/22  3:39 AM   Result Value Ref Range    Lipase 32 13 - 60 U/L   Urinalysis with Reflex to Culture    Collection Time: 03/18/22  7:22 AM    Specimen: Urine, clean catch   Result Value Ref Range    Color, UA YELLOW Straw/Yellow    Clarity, UA Clear Clear    Glucose, Ur Negative Negative mg/dL    Bilirubin Urine Negative Negative    Ketones, Urine Negative Negative mg/dL    Specific Gravity, UA 1.009 1.005 - 1.030    Blood, Urine Negative Negative    pH, UA 7.0 5.0 - 8.0    Protein, UA Negative Negative mg/dL    Urobilinogen, Urine 0.2 <2.0 E.U./dL    Nitrite, Urine Negative Negative    Leukocyte Esterase, Urine Negative Negative   CBC with Auto Differential    Collection Time: 03/18/22  7:22 AM   Result Value Ref Range    WBC 13.0 (H) 4.8 - 10.8 K/uL    RBC 4.24 (L) 4.70 - 6.10 M/uL    Hemoglobin 12.6 (L) 14.0 - 18.0 g/dL    Hematocrit 39.6 (L) 42.0 - 52.0 %    MCV 93.4 80.0 - 94.0 fL    MCH 29.7 27.0 - 31.0 pg    MCHC 31.8 (L) 33.0 - 37.0 g/dL    RDW 14.1 11.5 - 14.5 %    Platelets 692 (H) 334 - 400 K/uL    MPV 9.5 9.4 - 12.4 fL U/L    AST 38 5 - 40 U/L   CBC with Auto Differential    Collection Time: 03/22/22  7:28 AM   Result Value Ref Range    WBC 8.4 4.8 - 10.8 K/uL    RBC 4.35 (L) 4.70 - 6.10 M/uL    Hemoglobin 12.9 (L) 14.0 - 18.0 g/dL    Hematocrit 40.6 (L) 42.0 - 52.0 %    MCV 93.3 80.0 - 94.0 fL    MCH 29.7 27.0 - 31.0 pg    MCHC 31.8 (L) 33.0 - 37.0 g/dL    RDW 13.9 11.5 - 14.5 %    Platelets 986 460 - 382 K/uL    MPV 10.0 9.4 - 12.4 fL    Neutrophils % 62.2 50.0 - 65.0 %    Lymphocytes % 25.3 20.0 - 40.0 %    Monocytes % 8.8 0.0 - 10.0 %    Eosinophils % 2.6 0.0 - 5.0 %    Basophils % 0.7 0.0 - 1.0 %    Neutrophils Absolute 5.3 1.5 - 7.5 K/uL    Immature Granulocytes # 0.0 K/uL    Lymphocytes Absolute 2.1 1.1 - 4.5 K/uL    Monocytes Absolute 0.70 0.00 - 0.90 K/uL    Eosinophils Absolute 0.20 0.00 - 0.60 K/uL    Basophils Absolute 0.10 0.00 - 0.20 K/uL   Lipase    Collection Time: 03/22/22  7:28 AM   Result Value Ref Range    Lipase 215 (H) 13 - 60 U/L   Protime-INR    Collection Time: 03/22/22  7:28 AM   Result Value Ref Range    Protime 14.9 (H) 12.0 - 14.6 sec    INR 1.16 0.88 - 1.18   EKG 12 Lead    Collection Time: 03/22/22  7:55 AM   Result Value Ref Range    P-R Interval 132 ms    QRS Duration 78 ms    Q-T Interval 316 ms    QTc Calculation (Bazett) 394 ms    P Axis 62 degrees    T Axis 62 degrees   COVID-19, Rapid    Collection Time: 03/22/22 10:20 AM    Specimen: Nasopharyngeal Swab   Result Value Ref Range    SARS-CoV-2, NAAT Not Detected Not Detected   CBC with Auto Differential    Collection Time: 03/23/22  3:01 AM   Result Value Ref Range    WBC 7.1 4.8 - 10.8 K/uL    RBC 3.59 (L) 4.70 - 6.10 M/uL    Hemoglobin 10.7 (L) 14.0 - 18.0 g/dL    Hematocrit 34.0 (L) 42.0 - 52.0 %    MCV 94.7 (H) 80.0 - 94.0 fL    MCH 29.8 27.0 - 31.0 pg    MCHC 31.5 (L) 33.0 - 37.0 g/dL    RDW 13.8 11.5 - 14.5 %    Platelets 900 416 - 574 K/uL    MPV 10.2 9.4 - 12.4 fL    Neutrophils % 73.9 (H) 50.0 - 65.0 %    Lymphocytes % 14.5 (L) 20.0 - 40.0 %    Monocytes % 7.8 0.0 - 10.0 %    Eosinophils % 2.7 0.0 - 5.0 %    Basophils % 0.7 0.0 - 1.0 %    Neutrophils Absolute 5.2 1.5 - 7.5 K/uL    Immature Granulocytes # 0.0 K/uL    Lymphocytes Absolute 1.0 (L) 1.1 - 4.5 K/uL    Monocytes Absolute 0.60 0.00 - 0.90 K/uL    Eosinophils Absolute 0.20 0.00 - 0.60 K/uL    Basophils Absolute 0.10 0.00 - 0.20 K/uL   Triglyceride    Collection Time: 03/23/22  3:02 AM   Result Value Ref Range    Triglycerides 89 0 - 149 mg/dL   Comprehensive Metabolic Panel w/ Reflex to MG    Collection Time: 03/23/22  3:02 AM   Result Value Ref Range    Sodium 136 136 - 145 mmol/L    Potassium reflex Magnesium 4.7 3.5 - 5.0 mmol/L    Chloride 99 98 - 111 mmol/L    CO2 23 22 - 29 mmol/L    Anion Gap 14 7 - 19 mmol/L    Glucose 102 74 - 109 mg/dL    BUN 20 8 - 23 mg/dL    CREATININE 0.8 0.5 - 1.2 mg/dL    GFR Non-African American >60 >60    GFR African American >59 >59    Calcium 9.9 8.8 - 10.2 mg/dL    Total Protein 5.3 (L) 6.6 - 8.7 g/dL    Albumin 3.1 (L) 3.5 - 5.2 g/dL    Total Bilirubin 0.6 0.2 - 1.2 mg/dL    Alkaline Phosphatase 92 40 - 130 U/L    ALT 38 5 - 41 U/L    AST 27 5 - 40 U/L   Lipase    Collection Time: 03/23/22  3:02 AM   Result Value Ref Range    Lipase 291 (H) 13 - 60 U/L   Amylase    Collection Time: 03/23/22  3:02 AM   Result Value Ref Range    Amylase 512 (H) 28 - 100 U/L   Culture, Body Fluid    Collection Time: 03/23/22 10:59 AM    Specimen: Pancreas;  Body Fluid   Result Value Ref Range    Body Fluid Culture, Sterile No growth 4 days     Gram Stain Result No WBCs or organisms seen     Anaerobic Culture No anaerobes isolated  4 days    Comprehensive Metabolic Panel w/ Reflex to MG    Collection Time: 03/24/22  4:33 AM   Result Value Ref Range    Sodium 136 136 - 145 mmol/L    Potassium reflex Magnesium 4.4 3.5 - 5.0 mmol/L    Chloride 102 98 - 111 mmol/L    CO2 25 22 - 29 mmol/L    Anion Gap 9 7 - 19 mmol/L    Glucose 112 (H) 74 - 109 mg/dL    BUN 15 8 - 23 mg/dL CREATININE 0.7 0.5 - 1.2 mg/dL    GFR Non-African American >60 >60    GFR African American >59 >59    Calcium 8.6 (L) 8.8 - 10.2 mg/dL    Total Protein 4.7 (L) 6.6 - 8.7 g/dL    Albumin 2.6 (L) 3.5 - 5.2 g/dL    Total Bilirubin 0.5 0.2 - 1.2 mg/dL    Alkaline Phosphatase 79 40 - 130 U/L    ALT 29 5 - 41 U/L    AST 21 5 - 40 U/L   CBC with Auto Differential    Collection Time: 03/24/22  4:33 AM   Result Value Ref Range    WBC 5.1 4.8 - 10.8 K/uL    RBC 3.32 (L) 4.70 - 6.10 M/uL    Hemoglobin 9.8 (L) 14.0 - 18.0 g/dL    Hematocrit 31.8 (L) 42.0 - 52.0 %    MCV 95.8 (H) 80.0 - 94.0 fL    MCH 29.5 27.0 - 31.0 pg    MCHC 30.8 (L) 33.0 - 37.0 g/dL    RDW 13.8 11.5 - 14.5 %    Platelets 189 836 - 393 K/uL    MPV 10.0 9.4 - 12.4 fL    Neutrophils % 62.9 50.0 - 65.0 %    Lymphocytes % 22.4 20.0 - 40.0 %    Monocytes % 9.3 0.0 - 10.0 %    Eosinophils % 4.8 0.0 - 5.0 %    Basophils % 0.2 0.0 - 1.0 %    Neutrophils Absolute 3.2 1.5 - 7.5 K/uL    Immature Granulocytes # 0.0 K/uL    Lymphocytes Absolute 1.1 1.1 - 4.5 K/uL    Monocytes Absolute 0.50 0.00 - 0.90 K/uL    Eosinophils Absolute 0.20 0.00 - 0.60 K/uL    Basophils Absolute 0.00 0.00 - 0.20 K/uL   Comprehensive Metabolic Panel w/ Reflex to MG    Collection Time: 03/25/22  3:51 AM   Result Value Ref Range    Sodium 140 136 - 145 mmol/L    Potassium reflex Magnesium 4.0 3.5 - 5.0 mmol/L    Chloride 104 98 - 111 mmol/L    CO2 25 22 - 29 mmol/L    Anion Gap 11 7 - 19 mmol/L    Glucose 134 (H) 74 - 109 mg/dL    BUN 9 8 - 23 mg/dL    CREATININE 0.6 0.5 - 1.2 mg/dL    GFR Non-African American >60 >60    GFR African American >59 >59    Calcium 8.6 (L) 8.8 - 10.2 mg/dL    Total Protein 5.0 (L) 6.6 - 8.7 g/dL    Albumin 2.9 (L) 3.5 - 5.2 g/dL    Total Bilirubin 0.4 0.2 - 1.2 mg/dL    Alkaline Phosphatase 81 40 - 130 U/L    ALT 27 5 - 41 U/L    AST 20 5 - 40 U/L   CBC with Auto Differential    Collection Time: 03/25/22  3:51 AM   Result Value Ref Range    WBC 4.9 4.8 - 10.8 K/uL RBC 3.34 (L) 4.70 - 6.10 M/uL    Hemoglobin 9.9 (L) 14.0 - 18.0 g/dL    Hematocrit 31.6 (L) 42.0 - 52.0 %    MCV 94.6 (H) 80.0 - 94.0 fL    MCH 29.6 27.0 - 31.0 pg    MCHC 31.3 (L) 33.0 - 37.0 g/dL    RDW 13.6 11.5 - 14.5 %    Platelets 491 333 - 526 K/uL    MPV 10.5 9.4 - 12.4 fL    Neutrophils % 61.9 50.0 - 65.0 %    Lymphocytes % 24.8 20.0 - 40.0 %    Monocytes % 8.2 0.0 - 10.0 %    Eosinophils % 4.3 0.0 - 5.0 %    Basophils % 0.4 0.0 - 1.0 %    Neutrophils Absolute 3.0 1.5 - 7.5 K/uL    Immature Granulocytes # 0.0 K/uL    Lymphocytes Absolute 1.2 1.1 - 4.5 K/uL    Monocytes Absolute 0.40 0.00 - 0.90 K/uL    Eosinophils Absolute 0.20 0.00 - 0.60 K/uL    Basophils Absolute 0.00 0.00 - 0.20 K/uL   Comprehensive Metabolic Panel w/ Reflex to MG    Collection Time: 03/26/22  3:40 AM   Result Value Ref Range    Sodium 140 136 - 145 mmol/L    Potassium reflex Magnesium 3.8 3.5 - 5.0 mmol/L    Chloride 106 98 - 111 mmol/L    CO2 23 22 - 29 mmol/L    Anion Gap 11 7 - 19 mmol/L    Glucose 143 (H) 74 - 109 mg/dL    BUN 8 8 - 23 mg/dL    CREATININE 0.6 0.5 - 1.2 mg/dL    GFR Non-African American >60 >60    GFR African American >59 >59    Calcium 8.5 (L) 8.8 - 10.2 mg/dL    Total Protein 5.0 (L) 6.6 - 8.7 g/dL    Albumin 3.0 (L) 3.5 - 5.2 g/dL    Total Bilirubin 0.5 0.2 - 1.2 mg/dL    Alkaline Phosphatase 82 40 - 130 U/L    ALT 24 5 - 41 U/L    AST 19 5 - 40 U/L   CBC with Auto Differential    Collection Time: 03/26/22  3:40 AM   Result Value Ref Range    WBC 5.3 4.8 - 10.8 K/uL    RBC 3.39 (L) 4.70 - 6.10 M/uL    Hemoglobin 10.3 (L) 14.0 - 18.0 g/dL    Hematocrit 31.7 (L) 42.0 - 52.0 %    MCV 93.5 80.0 - 94.0 fL    MCH 30.4 27.0 - 31.0 pg    MCHC 32.5 (L) 33.0 - 37.0 g/dL    RDW 13.3 11.5 - 14.5 %    Platelets 270 999 - 658 K/uL    MPV 9.6 9.4 - 12.4 fL    Neutrophils % 63.0 50.0 - 65.0 %    Lymphocytes % 24.3 20.0 - 40.0 %    Monocytes % 7.4 0.0 - 10.0 %    Eosinophils % 4.5 0.0 - 5.0 %    Basophils % 0.4 0.0 - 1.0 % Neutrophils Absolute 3.3 1.5 - 7.5 K/uL    Immature Granulocytes # 0.0 K/uL    Lymphocytes Absolute 1.3 1.1 - 4.5 K/uL    Monocytes Absolute 0.40 0.00 - 0.90 K/uL    Eosinophils Absolute 0.20 0.00 - 0.60 K/uL    Basophils Absolute 0.00 0.00 - 0.20 K/uL   Hepatic Function Panel    Collection Time: 03/31/22  8:25 AM   Result Value Ref Range    Total Protein 6.7 6.6 - 8.7 g/dL    Albumin 4.1 3.5 - 5.2 g/dL    Alkaline Phosphatase 105 40 - 130 U/L    ALT 31 5 - 41 U/L    AST 24 5 - 40 U/L    Total Bilirubin 0.5 0.2 - 1.2 mg/dL    Bilirubin, Direct 0.2 0.0 - 0.3 mg/dL    Bilirubin, Indirect 0.3 0.1 - 1.0 mg/dL   Lipase    Collection Time: 03/31/22  8:25 AM   Result Value Ref Range    Lipase 192 (H) 13 - 60 U/L               Assessment & Plan: The following diagnoses and conditions are stable with no further orders unless indicated: There are no diagnoses linked to this encounter. No follow-ups on file. Discussed use, benefit, and side effects of prescribed medications. All patient questions answered. Pt voiced understanding. Reviewed health maintenance. Instructedto continue current medications, diet and exercise. Patient agreed with treatmentplan.  Follow up as directed.     _______________________________________________________________      Past Medical History:   Diagnosis Date    Metastatic renal cell carcinoma (HCC)     Nasopharyngeal mass     Metastatic renal cell carcinoma, clear cell      Past Surgical History:   Procedure Laterality Date    ADRENALECTOMY Left 10/2017    COLONOSCOPY  12/09/2019    Dr Farshad Ennis: Prolapsed Thrombosed Hemorrhoids    NOSE SURGERY  12/2017    Renal cell carcinoma, metastatic clear cell, April 2016, December 2017    PARTIAL NEPHRECTOMY Left 10/2017    t3a n0 m0    UPPER GASTROINTESTINAL ENDOSCOPY N/A 03/23/2022    Dr Pamela Saenz (Dr Jeffers Cleaves pt) w/EUS and fna-Pancreatic fluid collections appearing to cause extrinsic compression on the distal stomach, placement of post pyloric DHT, duodenitis    UPPER GASTROINTESTINAL ENDOSCOPY N/A 03/23/2022    Dr Sofy Day (Dr Sivan Hooper pt) w/EUS and fna-Pancreatic fluid collections appearing to cause extrinsic compression on the distal stomach, placement of post pyloric DHT, duodenitis       Family History   Problem Relation Age of Onset    Breast Cancer Mother     Breast Cancer Sister     Cancer Brother     Colon Cancer Neg Hx     Colon Polyps Neg Hx        Social History     Tobacco Use    Smoking status: Never Smoker    Smokeless tobacco: Never Used   Substance Use Topics    Alcohol use: No     Current Outpatient Medications   Medication Sig Dispense Refill    tamsulosin (FLOMAX) 0.4 MG capsule Take 2 capsules by mouth daily 180 capsule 3    pantoprazole (PROTONIX) 40 MG tablet Take 1 tablet by mouth 2 times daily (before meals) 60 tablet 0    calcium carbonate (TUMS) 500 MG chewable tablet Take 1 tablet by mouth as needed for Heartburn      lipase-protease-amylase (CREON) 15587-64249 units delayed release capsule Take 1 capsule by mouth 3 times daily (with meals) (Patient not taking: Reported on 3/31/2022) 90 capsule 0    Multiple Vitamins-Minerals (THERAPEUTIC MULTIVITAMIN-MINERALS) tablet Take 1 tablet by mouth daily (Patient not taking: Reported on 3/31/2022)      vitamin E 1000 units capsule Take 1,000 Units by mouth daily (Patient not taking: Reported on 3/31/2022)      vitamin C (ASCORBIC ACID) 500 MG tablet Take 500 mg by mouth daily (Patient not taking: Reported on 3/31/2022)      niacin 100 MG tablet Take 100 mg by mouth daily (with breakfast) (Patient not taking: Reported on 3/31/2022)      cetirizine (ZYRTEC) 10 MG tablet Take 10 mg by mouth       No current facility-administered medications for this visit.      Allergies   Allergen Reactions    Nexium [Esomeprazole Magnesium] Rash    Prilosec [Omeprazole] Rash       Health Maintenance   Topic Date Due    DTaP/Tdap/Td vaccine (1 - Tdap) Never done  Diabetes screen  Never done    Colorectal Cancer Screen  Never done    COVID-19 Vaccine (3 - Booster for [employer] series) 09/18/2021    Annual Wellness Visit (AWV)  09/21/2021    Depression Screen  05/10/2022    Lipid screen  03/12/2026    Flu vaccine  Completed    Shingles Vaccine  Completed    Pneumococcal 65+ years Vaccine  Completed    Hepatitis C screen  Completed    Hepatitis A vaccine  Aged Out    Hepatitis B vaccine  Aged Out    Hib vaccine  Aged Out    Meningococcal (ACWY) vaccine  Aged Out       _______________________________________________________________    Note dictated using [id number] [facility]  Sometimes this dictation software makes erroneous transcriptions.

## 2022-04-12 PROBLEM — E43 SEVERE MALNUTRITION (HCC): Status: RESOLVED | Noted: 2022-03-24 | Resolved: 2022-04-12

## 2022-04-13 ENCOUNTER — TELEPHONE (OUTPATIENT)
Dept: GASTROENTEROLOGY | Age: 72
End: 2022-04-13

## 2022-04-13 DIAGNOSIS — Z87.19 HX OF ACUTE PANCREATITIS: Primary | ICD-10-CM

## 2022-04-13 NOTE — TELEPHONE ENCOUNTER
4/13/22  Reminder call to pt about repeat on his blood work. Pt voiced understanding and will be by tomorrow.

## 2022-04-14 ENCOUNTER — TELEPHONE (OUTPATIENT)
Dept: GASTROENTEROLOGY | Age: 72
End: 2022-04-14

## 2022-04-14 ENCOUNTER — OFFICE VISIT (OUTPATIENT)
Dept: SURGERY | Age: 72
End: 2022-04-14
Payer: MEDICARE

## 2022-04-14 VITALS
WEIGHT: 188.8 LBS | SYSTOLIC BLOOD PRESSURE: 126 MMHG | OXYGEN SATURATION: 98 % | DIASTOLIC BLOOD PRESSURE: 66 MMHG | HEART RATE: 102 BPM | BODY MASS INDEX: 25.02 KG/M2 | TEMPERATURE: 97.6 F | HEIGHT: 73 IN

## 2022-04-14 DIAGNOSIS — K85.90 ACUTE RECURRENT PANCREATITIS: Primary | ICD-10-CM

## 2022-04-14 DIAGNOSIS — K80.20 CALCULUS OF GALLBLADDER WITHOUT CHOLECYSTITIS WITHOUT OBSTRUCTION: ICD-10-CM

## 2022-04-14 DIAGNOSIS — Z87.19 HX OF ACUTE PANCREATITIS: ICD-10-CM

## 2022-04-14 LAB — LIPASE: 105 U/L (ref 13–60)

## 2022-04-14 PROCEDURE — 1111F DSCHRG MED/CURRENT MED MERGE: CPT | Performed by: SURGERY

## 2022-04-14 PROCEDURE — 1036F TOBACCO NON-USER: CPT | Performed by: SURGERY

## 2022-04-14 PROCEDURE — 3017F COLORECTAL CA SCREEN DOC REV: CPT | Performed by: SURGERY

## 2022-04-14 PROCEDURE — 1123F ACP DISCUSS/DSCN MKR DOCD: CPT | Performed by: SURGERY

## 2022-04-14 PROCEDURE — G8427 DOCREV CUR MEDS BY ELIG CLIN: HCPCS | Performed by: SURGERY

## 2022-04-14 PROCEDURE — 4040F PNEUMOC VAC/ADMIN/RCVD: CPT | Performed by: SURGERY

## 2022-04-14 PROCEDURE — 99213 OFFICE O/P EST LOW 20 MIN: CPT | Performed by: SURGERY

## 2022-04-14 PROCEDURE — G8420 CALC BMI NORM PARAMETERS: HCPCS | Performed by: SURGERY

## 2022-04-14 RX ORDER — SODIUM CHLORIDE 0.9 % (FLUSH) 0.9 %
5-40 SYRINGE (ML) INJECTION PRN
Status: CANCELLED | OUTPATIENT
Start: 2022-04-14

## 2022-04-14 RX ORDER — SODIUM CHLORIDE 0.9 % (FLUSH) 0.9 %
5-40 SYRINGE (ML) INJECTION EVERY 12 HOURS SCHEDULED
Status: CANCELLED | OUTPATIENT
Start: 2022-04-14

## 2022-04-14 RX ORDER — SODIUM CHLORIDE 9 MG/ML
25 INJECTION, SOLUTION INTRAVENOUS PRN
Status: CANCELLED | OUTPATIENT
Start: 2022-04-14

## 2022-04-14 RX ORDER — ACETAMINOPHEN 325 MG/1
1000 TABLET ORAL ONCE
Status: CANCELLED | OUTPATIENT
Start: 2022-04-14 | End: 2022-04-14

## 2022-04-14 ASSESSMENT — ENCOUNTER SYMPTOMS
EYE DISCHARGE: 0
EYE REDNESS: 0
NAUSEA: 1
EYE PAIN: 0
WHEEZING: 0
SHORTNESS OF BREATH: 0
CHEST TIGHTNESS: 0
DIARRHEA: 0
CONSTIPATION: 0
ABDOMINAL DISTENTION: 1
FACIAL SWELLING: 0
ABDOMINAL PAIN: 1
COUGH: 0

## 2022-04-14 NOTE — H&P (VIEW-ONLY)
SUBJECTIVE:  Mr. COLES [facility] is a 73 y.o. male who presents today for follow-up after hospitalization for gallstone pancreatitis. Patient recently admitted again for pancreatitis with concern for pancreatic necrosis. Underwent EUS with FNA. Postpyloric tube placed at that time. Patient was seen by Dr. Belinda Gonsales office this a.m. and had his feeding tube removed. Still feeling very weak. Eating a low-fat diet but states he has lost over 20 pounds during the situation. Returns today for reevaluation. Doing much better. Now tolerating regular diet. States his abdominal pain is mostly resolved. Patient's medications, allergies, past medical, surgical, social and family histories werereviewed and updated as appropriate. Review of Systems   Constitutional: Positive for fatigue. Negative for chills, diaphoresis and fever. HENT: Negative for ear discharge, ear pain, facial swelling and nosebleeds. Eyes: Negative for pain, discharge and redness. Respiratory: Negative for cough, chest tightness, shortness of breath and wheezing. Cardiovascular: Negative for chest pain and palpitations. Gastrointestinal: Positive for abdominal distention, abdominal pain and nausea. Negative for constipation and diarrhea. Genitourinary: Negative for difficulty urinating, flank pain and hematuria. Musculoskeletal: Negative for neck pain and neck stiffness. Neurological: Positive for weakness. Negative for dizziness, numbness and headaches. Psychiatric/Behavioral: Negative for agitation, behavioral problems and self-injury. OBJECTIVE:  /66   Pulse 102   Temp 97.6 °F (36.4 °C)   Ht 6' 1\" (1.854 m)   Wt 188 lb 12.8 oz (85.6 kg)   SpO2 98%   BMI 24.91 kg/m²   Physical Exam  Vitals reviewed. Constitutional:       Appearance: Normal appearance. HENT:      Head: Normocephalic and atraumatic.       Right Ear: External ear normal.      Left Ear: External ear normal.      Nose: Nose normal.   Eyes: Extraocular Movements: Extraocular movements intact. Pulmonary:      Effort: Pulmonary effort is normal. No respiratory distress. Abdominal:      General: There is distension. Palpations: Abdomen is soft. Tenderness: There is no abdominal tenderness. There is no guarding or rebound. Musculoskeletal:         General: Normal range of motion. Cervical back: Normal range of motion. Skin:     General: Skin is warm and dry. Neurological:      General: No focal deficit present. Mental Status: He is alert and oriented to person, place, and time. Psychiatric:         Mood and Affect: Mood normal.         Behavior: Behavior normal.         Thought Content: Thought content normal.         ASSESSMENT:   Diagnosis Orders   1. Acute recurrent pancreatitis     2. Calculus of gallbladder without cholecystitis without obstruction         PLAN:  No orders of the defined types were placed in this encounter. No orders of the defined types were placed in this encounter. .The risks, benefits, and alternatives were discussed with the pt. He is willing to accept the risks and proceed with a Robotic Cholecystectomy poss open. The surgical risks included but not limited to bleeding, infection, perforation, recurrence, risk of needing further surgery, etc. The anesthetic risks included heart attacks, strokes, pneumonia, phlebitis, etc.  He is willing to accept all risks and proceed with surgery. No guarantees have been given. No follow-ups on file.     Brian Dobbs DO 4/14/2022 1:46 PM

## 2022-04-14 NOTE — LETTER
SCHEDULING INSTRUCTIONS:     Patient: Edu Stewart  : 1950    Hospital: Hospital    Admitting Physician:  Dr. Xiomara Jeffries    Diagnosis: Sym Cholelithiasis w/ pancreatitis    Procedure: Robotic Cholecystectomy    ALLOW ADDITIONAL 30 MINS FOR TURNOVER EXCEPT FOR PHYSICIAN'S BOUNCE DAY    Anesthesia: GETA    Admission:Outpatient     Date: 2022          [unfilled]     NOT TO BE USED OUTSIDE OF THE OFFICE

## 2022-04-14 NOTE — PROGRESS NOTES
SUBJECTIVE:  Mr. COLES [facility] is a 73 y.o. male who presents today for follow-up after hospitalization for gallstone pancreatitis. Patient recently admitted again for pancreatitis with concern for pancreatic necrosis. Underwent EUS with FNA. Postpyloric tube placed at that time. Patient was seen by Dr. Igor Briones office this a.m. and had his feeding tube removed. Still feeling very weak. Eating a low-fat diet but states he has lost over 20 pounds during the situation. Returns today for reevaluation. Doing much better. Now tolerating regular diet. States his abdominal pain is mostly resolved. Patient's medications, allergies, past medical, surgical, social and family histories werereviewed and updated as appropriate. Review of Systems   Constitutional: Positive for fatigue. Negative for chills, diaphoresis and fever. HENT: Negative for ear discharge, ear pain, facial swelling and nosebleeds. Eyes: Negative for pain, discharge and redness. Respiratory: Negative for cough, chest tightness, shortness of breath and wheezing. Cardiovascular: Negative for chest pain and palpitations. Gastrointestinal: Positive for abdominal distention, abdominal pain and nausea. Negative for constipation and diarrhea. Genitourinary: Negative for difficulty urinating, flank pain and hematuria. Musculoskeletal: Negative for neck pain and neck stiffness. Neurological: Positive for weakness. Negative for dizziness, numbness and headaches. Psychiatric/Behavioral: Negative for agitation, behavioral problems and self-injury. OBJECTIVE:  /66   Pulse 102   Temp 97.6 °F (36.4 °C)   Ht 6' 1\" (1.854 m)   Wt 188 lb 12.8 oz (85.6 kg)   SpO2 98%   BMI 24.91 kg/m²   Physical Exam  Vitals reviewed. Constitutional:       Appearance: Normal appearance. HENT:      Head: Normocephalic and atraumatic.       Right Ear: External ear normal.      Left Ear: External ear normal.      Nose: Nose normal.   Eyes: Extraocular Movements: Extraocular movements intact. Pulmonary:      Effort: Pulmonary effort is normal. No respiratory distress. Abdominal:      General: There is distension. Palpations: Abdomen is soft. Tenderness: There is no abdominal tenderness. There is no guarding or rebound. Musculoskeletal:         General: Normal range of motion. Cervical back: Normal range of motion. Skin:     General: Skin is warm and dry. Neurological:      General: No focal deficit present. Mental Status: He is alert and oriented to person, place, and time. Psychiatric:         Mood and Affect: Mood normal.         Behavior: Behavior normal.         Thought Content: Thought content normal.         ASSESSMENT:   Diagnosis Orders   1. Acute recurrent pancreatitis     2. Calculus of gallbladder without cholecystitis without obstruction         PLAN:  No orders of the defined types were placed in this encounter. No orders of the defined types were placed in this encounter. .The risks, benefits, and alternatives were discussed with the pt. He is willing to accept the risks and proceed with a Robotic Cholecystectomy poss open. The surgical risks included but not limited to bleeding, infection, perforation, recurrence, risk of needing further surgery, etc. The anesthetic risks included heart attacks, strokes, pneumonia, phlebitis, etc.  He is willing to accept all risks and proceed with surgery. No guarantees have been given. No follow-ups on file.     [address] [place] [address],  4/14/2022 1:46 PM

## 2022-04-14 NOTE — H&P
SUBJECTIVE:  Mr. COLES [facility] is a 73 y.o. male who presents today for follow-up after hospitalization for gallstone pancreatitis. Patient recently admitted again for pancreatitis with concern for pancreatic necrosis. Underwent EUS with FNA. Postpyloric tube placed at that time. Patient was seen by Dr. Covarrubias Frames office this a.m. and had his feeding tube removed. Still feeling very weak. Eating a low-fat diet but states he has lost over 20 pounds during the situation. Returns today for reevaluation. Doing much better. Now tolerating regular diet. States his abdominal pain is mostly resolved. Patient's medications, allergies, past medical, surgical, social and family histories werereviewed and updated as appropriate. Review of Systems   Constitutional: Positive for fatigue. Negative for chills, diaphoresis and fever. HENT: Negative for ear discharge, ear pain, facial swelling and nosebleeds. Eyes: Negative for pain, discharge and redness. Respiratory: Negative for cough, chest tightness, shortness of breath and wheezing. Cardiovascular: Negative for chest pain and palpitations. Gastrointestinal: Positive for abdominal distention, abdominal pain and nausea. Negative for constipation and diarrhea. Genitourinary: Negative for difficulty urinating, flank pain and hematuria. Musculoskeletal: Negative for neck pain and neck stiffness. Neurological: Positive for weakness. Negative for dizziness, numbness and headaches. Psychiatric/Behavioral: Negative for agitation, behavioral problems and self-injury. OBJECTIVE:  /66   Pulse 102   Temp 97.6 °F (36.4 °C)   Ht 6' 1\" (1.854 m)   Wt 188 lb 12.8 oz (85.6 kg)   SpO2 98%   BMI 24.91 kg/m²   Physical Exam  Vitals reviewed. Constitutional:       Appearance: Normal appearance. HENT:      Head: Normocephalic and atraumatic.       Right Ear: External ear normal.      Left Ear: External ear normal.      Nose: Nose normal.   Eyes: Extraocular Movements: Extraocular movements intact. Pulmonary:      Effort: Pulmonary effort is normal. No respiratory distress. Abdominal:      General: There is distension. Palpations: Abdomen is soft. Tenderness: There is no abdominal tenderness. There is no guarding or rebound. Musculoskeletal:         General: Normal range of motion. Cervical back: Normal range of motion. Skin:     General: Skin is warm and dry. Neurological:      General: No focal deficit present. Mental Status: He is alert and oriented to person, place, and time. Psychiatric:         Mood and Affect: Mood normal.         Behavior: Behavior normal.         Thought Content: Thought content normal.         ASSESSMENT:   Diagnosis Orders   1. Acute recurrent pancreatitis     2. Calculus of gallbladder without cholecystitis without obstruction         PLAN:  No orders of the defined types were placed in this encounter. No orders of the defined types were placed in this encounter. .The risks, benefits, and alternatives were discussed with the pt. He is willing to accept the risks and proceed with a Robotic Cholecystectomy poss open. The surgical risks included but not limited to bleeding, infection, perforation, recurrence, risk of needing further surgery, etc. The anesthetic risks included heart attacks, strokes, pneumonia, phlebitis, etc.  He is willing to accept all risks and proceed with surgery. No guarantees have been given. No follow-ups on file.     Lex Ramirez DO 4/14/2022 1:46 PM

## 2022-04-19 ENCOUNTER — HOSPITAL ENCOUNTER (OUTPATIENT)
Dept: PREADMISSION TESTING | Age: 72
Discharge: HOME | End: 2022-04-23

## 2022-04-22 ENCOUNTER — PATIENT MESSAGE (OUTPATIENT)
Dept: FAMILY MEDICINE CLINIC | Age: 72
End: 2022-04-22

## 2022-04-22 ENCOUNTER — OFFICE VISIT (OUTPATIENT)
Dept: UROLOGY | Age: 72
End: 2022-04-22
Payer: MEDICARE

## 2022-04-22 VITALS — WEIGHT: 188 LBS | HEIGHT: 73 IN | TEMPERATURE: 98 F | BODY MASS INDEX: 24.92 KG/M2

## 2022-04-22 DIAGNOSIS — N13.8 BPH WITH OBSTRUCTION/LOWER URINARY TRACT SYMPTOMS: Primary | ICD-10-CM

## 2022-04-22 DIAGNOSIS — N40.1 BPH WITH OBSTRUCTION/LOWER URINARY TRACT SYMPTOMS: Primary | ICD-10-CM

## 2022-04-22 LAB
APPEARANCE FLUID: CLEAR
BILIRUBIN, POC: NORMAL
BLOOD URINE, POC: NORMAL
CLARITY, POC: CLEAR
COLOR, POC: YELLOW
GLUCOSE URINE, POC: NORMAL
KETONES, POC: NORMAL
LEUKOCYTE EST, POC: NORMAL
NITRITE, POC: NORMAL
PH, POC: 7
PROTEIN, POC: NORMAL
SPECIFIC GRAVITY, POC: 1.02
UROBILINOGEN, POC: 0.2

## 2022-04-22 PROCEDURE — 99214 OFFICE O/P EST MOD 30 MIN: CPT | Performed by: NURSE PRACTITIONER

## 2022-04-22 PROCEDURE — 1111F DSCHRG MED/CURRENT MED MERGE: CPT | Performed by: NURSE PRACTITIONER

## 2022-04-22 PROCEDURE — 1123F ACP DISCUSS/DSCN MKR DOCD: CPT | Performed by: NURSE PRACTITIONER

## 2022-04-22 PROCEDURE — 1036F TOBACCO NON-USER: CPT | Performed by: NURSE PRACTITIONER

## 2022-04-22 PROCEDURE — 51798 US URINE CAPACITY MEASURE: CPT | Performed by: NURSE PRACTITIONER

## 2022-04-22 PROCEDURE — 81002 URINALYSIS NONAUTO W/O SCOPE: CPT | Performed by: NURSE PRACTITIONER

## 2022-04-22 PROCEDURE — 4040F PNEUMOC VAC/ADMIN/RCVD: CPT | Performed by: NURSE PRACTITIONER

## 2022-04-22 PROCEDURE — G8420 CALC BMI NORM PARAMETERS: HCPCS | Performed by: NURSE PRACTITIONER

## 2022-04-22 PROCEDURE — G8427 DOCREV CUR MEDS BY ELIG CLIN: HCPCS | Performed by: NURSE PRACTITIONER

## 2022-04-22 PROCEDURE — 3017F COLORECTAL CA SCREEN DOC REV: CPT | Performed by: NURSE PRACTITIONER

## 2022-04-22 RX ORDER — PANTOPRAZOLE SODIUM 40 MG/1
40 TABLET, DELAYED RELEASE ORAL
Qty: 60 TABLET | Refills: 2 | Status: SHIPPED | OUTPATIENT
Start: 2022-04-22 | End: 2022-05-23 | Stop reason: SDUPTHER

## 2022-04-22 ASSESSMENT — ENCOUNTER SYMPTOMS
ABDOMINAL PAIN: 0
BACK PAIN: 0
VOMITING: 0
NAUSEA: 0
ABDOMINAL DISTENTION: 0

## 2022-04-22 NOTE — PROGRESS NOTES
Gray Hodgson is a 73 y.o., male, Established patient who presents today   Chief Complaint   Patient presents with    Follow-up     I am here today for my 3 week follow up. HPI   Patient presents for follow-up after increasing Flomax to 0.8 mg after an episode of urinary retention. Please see previous history collected on 3/29/2022 for further background information on this episode. He reports he has been utilizing the Flomax twice daily instead of 1 large dose of 0.8 mg. He reports he is unable to tolerate the dizziness with taking 2 capsules together. He is still experiencing nocturia 3-4 times per night, ever, his voiding overall is better with the increase in Flomax. He also reports since his release from constipation, he has noticed a significant improvement in his urination. VALARIE at his previous visit revealed 50 to 60 g prostate with some induration to the outer right gland, but no definite nodule. HPI collected 3/29/22  Patient presents for follow-up after episode of urinary retention. He reports this is his first episode. It did come on suddenly and occurred after he was discharged from the hospital for pancreatitis at the end of February. He presented to the emergency room for evaluation where about 1 L of urine was drained from the bladder. Prior to his episode of retention, he had been followed by Dr. La Milner in [place] for [facility]. He had already been maintained on Flomax every other day. He reports taking the medication every day made him severely dizzy, however, when he noticed his stream was worsening after his hospitalization, he did increase the dosing to daily. He states he did have 1 incident of elevated postvoid residual around 400 mL at his last checkup in December, however, he was constipated at that time. He also has a history of a left nephrectomy secondary to renal cell carcinoma in 2016. He is followed by MD K[facility] Rivera.   He reports his brother has a history of prostate cancer and was diagnosed in his late 46s. He denies any significant family history of bladder cancer. He denies any personal history of nephrolithiasis or hematuria. Past PSAs have been within normal limits. PSA has not been checked this year, but would not check in the setting of urinary retention. REVIEW OF SYSTEMS:  Review of Systems   Constitutional: Negative for chills and fever. Gastrointestinal: Negative for abdominal distention, abdominal pain, nausea and vomiting. Genitourinary: Negative for difficulty urinating, dysuria, flank pain, frequency, hematuria and urgency. Musculoskeletal: Negative for back pain and gait problem. Psychiatric/Behavioral: Negative for agitation and confusion. PHYSICAL EXAM:  Temp 98 °F (36.7 °C) (Temporal)   Ht 6' 1\" (1.854 m)   Wt 188 lb (85.3 kg)   BMI 24.80 kg/m²   Physical Exam  Vitals and nursing note reviewed. Constitutional:       General: He is not in acute distress. Appearance: Normal appearance. He is not ill-appearing. Pulmonary:      Effort: Pulmonary effort is normal. No respiratory distress. Abdominal:      General: There is no distension. Tenderness: There is no abdominal tenderness. There is no right CVA tenderness or left CVA tenderness. Neurological:      Mental Status: He is alert and oriented to person, place, and time. Mental status is at baseline.    Psychiatric:         Mood and Affect: Mood normal.         Behavior: Behavior normal.       DATA:  Results for orders placed or performed in visit on 04/22/22   POCT Urinalysis no Micro   Result Value Ref Range    Color, UA yellow     Clarity, UA clear     Glucose, UA POC neg     Bilirubin, UA neg     Ketones, UA neg     Spec Grav, UA 1.020     Blood, UA POC neg     pH, UA 7.0     Protein, UA POC neg     Urobilinogen, UA 0.2     Leukocytes, UA neg     Nitrite, UA neg     Appearance, Fluid Clear Clear, Slightly Cloudy     Bladder Scan interpretation  Estimation of residual urine via abdominal ultrasound  Residual Urine: 162 ml  Indication: BPH  Position: Supine  Examination: Incremental scanning of the suprapubic area using 3 MHz transducer using copious amounts of acoustic gel. Findings: An anechoic area was demonstrated which represented the bladder, with measurement of residual urine as noted. ASSESSMENT/PLAN  1. BPH with obstruction/lower urinary tract symptoms  Patient with BPH and previous episode of retention. Postvoid residual indicates he is not emptying his bladder completely, but adequately enough not to require catheterization. Patient is unable to tolerate 0.8 mg of Flomax at one time, but is able to tolerate 0.4 mg twice daily. He reports this has made a significant difference in his urinary symptoms, however, he is still having nocturia 3-4 times per night. We did discuss surgical interventions including UroLift, TURP, etc.  For now, patient is not really interested in pursuing this. He is more concerned with the GI issues he is experiencing with her recurrent pancreatitis. He reports he is having his gallbladder removed next week. I explained that he may experience some postoperative retention secondary to anesthesia and he voices understanding. We will plan for follow-up in about 6 months when he is able to recover completely from his GI issues, or sooner if needed. Patient and his wife voiced understanding.  - CO Measure, post-void residual, US, non-imaging  - POCT Urinalysis no Micro      Orders Placed This Encounter   Procedures    POCT Urinalysis no Micro    CO Measure, post-void residual, US, non-imaging        Return in about 6 months (around 10/22/2022). An electronic signature was used to authenticate this note.     ALBINO LY, APRN - CNP    All information inputted into the note by the MA to include chief complaint, past medical history, past surgical history, medications, allergies, social and family history and review of systems has been reviewed and updated as needed by me. EMR Dragon/transcription disclaimer: Much of this document is electronic transcription/translation of spoken language to printed text. The electronic translation of spoken language may be erroneous or, at times, nonsensical words or phrases may be inadvertently transcribed.  Although I have reviewed the document for such errors, some may still exist.

## 2022-04-22 NOTE — TELEPHONE ENCOUNTER
From: Samson Garcia  Sent: 4/22/2022 11:38 AM CDT  To: Letty Ten[facility] Clinical Staff  Subject: Protonix refill    Belen Vasquez prescribed this while I was in the hospital. The gastro group told me it would be best that I get your group to fill this, because I'm seeing you on a schedule where as they are pretty much done with seeing me for now.

## 2022-04-28 ENCOUNTER — ANESTHESIA EVENT (OUTPATIENT)
Dept: OPERATING ROOM | Age: 72
End: 2022-04-28
Payer: MEDICARE

## 2022-04-29 ENCOUNTER — ANESTHESIA (OUTPATIENT)
Dept: OPERATING ROOM | Age: 72
End: 2022-04-29
Payer: MEDICARE

## 2022-04-29 ENCOUNTER — HOSPITAL ENCOUNTER (OUTPATIENT)
Age: 72
Setting detail: OUTPATIENT SURGERY
Discharge: HOME | End: 2022-04-29
Attending: SURGERY | Admitting: SURGERY
Payer: MEDICARE

## 2022-04-29 VITALS
OXYGEN SATURATION: 97 % | DIASTOLIC BLOOD PRESSURE: 62 MMHG | TEMPERATURE: 95.9 F | RESPIRATION RATE: 17 BRPM | SYSTOLIC BLOOD PRESSURE: 122 MMHG

## 2022-04-29 VITALS
WEIGHT: 185 LBS | SYSTOLIC BLOOD PRESSURE: 144 MMHG | HEART RATE: 95 BPM | HEIGHT: 73 IN | TEMPERATURE: 98.1 F | BODY MASS INDEX: 24.52 KG/M2 | DIASTOLIC BLOOD PRESSURE: 76 MMHG | RESPIRATION RATE: 16 BRPM | OXYGEN SATURATION: 97 %

## 2022-04-29 DIAGNOSIS — K80.20 CALCULUS OF GALLBLADDER WITHOUT CHOLECYSTITIS WITHOUT OBSTRUCTION: Primary | ICD-10-CM

## 2022-04-29 PROCEDURE — 3600000019 HC SURGERY ROBOT ADDTL 15MIN: Performed by: SURGERY

## 2022-04-29 PROCEDURE — 6360000002 HC RX W HCPCS: Performed by: REGISTERED NURSE

## 2022-04-29 PROCEDURE — 7100000001 HC PACU RECOVERY - ADDTL 15 MIN: Performed by: SURGERY

## 2022-04-29 PROCEDURE — 6370000000 HC RX 637 (ALT 250 FOR IP): Performed by: SURGERY

## 2022-04-29 PROCEDURE — 2709999900 HC NON-CHARGEABLE SUPPLY: Performed by: SURGERY

## 2022-04-29 PROCEDURE — 3700000001 HC ADD 15 MINUTES (ANESTHESIA): Performed by: SURGERY

## 2022-04-29 PROCEDURE — 6370000000 HC RX 637 (ALT 250 FOR IP): Performed by: ANESTHESIOLOGY

## 2022-04-29 PROCEDURE — 2580000003 HC RX 258: Performed by: ANESTHESIOLOGY

## 2022-04-29 PROCEDURE — 47562 LAPAROSCOPIC CHOLECYSTECTOMY: CPT | Performed by: SURGERY

## 2022-04-29 PROCEDURE — 7100000011 HC PHASE II RECOVERY - ADDTL 15 MIN: Performed by: SURGERY

## 2022-04-29 PROCEDURE — 7100000000 HC PACU RECOVERY - FIRST 15 MIN: Performed by: SURGERY

## 2022-04-29 PROCEDURE — C9290 INJ, BUPIVACAINE LIPOSOME: HCPCS | Performed by: SURGERY

## 2022-04-29 PROCEDURE — 2500000003 HC RX 250 WO HCPCS: Performed by: REGISTERED NURSE

## 2022-04-29 PROCEDURE — 3700000000 HC ANESTHESIA ATTENDED CARE: Performed by: SURGERY

## 2022-04-29 PROCEDURE — 7100000010 HC PHASE II RECOVERY - FIRST 15 MIN: Performed by: SURGERY

## 2022-04-29 PROCEDURE — 6360000002 HC RX W HCPCS: Performed by: SURGERY

## 2022-04-29 PROCEDURE — S2900 ROBOTIC SURGICAL SYSTEM: HCPCS | Performed by: SURGERY

## 2022-04-29 PROCEDURE — 3600000009 HC SURGERY ROBOT BASE: Performed by: SURGERY

## 2022-04-29 PROCEDURE — 2500000003 HC RX 250 WO HCPCS: Performed by: SURGERY

## 2022-04-29 PROCEDURE — 88304 TISSUE EXAM BY PATHOLOGIST: CPT

## 2022-04-29 RX ORDER — KETAMINE HYDROCHLORIDE 50 MG/ML
INJECTION, SOLUTION, CONCENTRATE INTRAMUSCULAR; INTRAVENOUS PRN
Status: DISCONTINUED | OUTPATIENT
Start: 2022-04-29 | End: 2022-04-29 | Stop reason: SDUPTHER

## 2022-04-29 RX ORDER — LABETALOL 20 MG/4 ML (5 MG/ML) INTRAVENOUS SYRINGE
PRN
Status: DISCONTINUED | OUTPATIENT
Start: 2022-04-29 | End: 2022-04-29 | Stop reason: SDUPTHER

## 2022-04-29 RX ORDER — MIDAZOLAM HYDROCHLORIDE 1 MG/ML
INJECTION INTRAMUSCULAR; INTRAVENOUS PRN
Status: DISCONTINUED | OUTPATIENT
Start: 2022-04-29 | End: 2022-04-29 | Stop reason: SDUPTHER

## 2022-04-29 RX ORDER — SODIUM CHLORIDE 0.9 % (FLUSH) 0.9 %
5-40 SYRINGE (ML) INJECTION EVERY 12 HOURS SCHEDULED
Status: DISCONTINUED | OUTPATIENT
Start: 2022-04-29 | End: 2022-04-29 | Stop reason: HOSPADM

## 2022-04-29 RX ORDER — HYDROMORPHONE HYDROCHLORIDE 1 MG/ML
0.25 INJECTION, SOLUTION INTRAMUSCULAR; INTRAVENOUS; SUBCUTANEOUS EVERY 5 MIN PRN
Status: DISCONTINUED | OUTPATIENT
Start: 2022-04-29 | End: 2022-04-29 | Stop reason: HOSPADM

## 2022-04-29 RX ORDER — DEXAMETHASONE SODIUM PHOSPHATE 10 MG/ML
INJECTION, SOLUTION INTRAMUSCULAR; INTRAVENOUS PRN
Status: DISCONTINUED | OUTPATIENT
Start: 2022-04-29 | End: 2022-04-29 | Stop reason: SDUPTHER

## 2022-04-29 RX ORDER — HYDROMORPHONE HYDROCHLORIDE 1 MG/ML
0.5 INJECTION, SOLUTION INTRAMUSCULAR; INTRAVENOUS; SUBCUTANEOUS EVERY 5 MIN PRN
Status: DISCONTINUED | OUTPATIENT
Start: 2022-04-29 | End: 2022-04-29 | Stop reason: HOSPADM

## 2022-04-29 RX ORDER — SODIUM CHLORIDE 9 MG/ML
INJECTION, SOLUTION INTRAVENOUS PRN
Status: DISCONTINUED | OUTPATIENT
Start: 2022-04-29 | End: 2022-04-29 | Stop reason: HOSPADM

## 2022-04-29 RX ORDER — SODIUM CHLORIDE 0.9 % (FLUSH) 0.9 %
5-40 SYRINGE (ML) INJECTION PRN
Status: DISCONTINUED | OUTPATIENT
Start: 2022-04-29 | End: 2022-04-29 | Stop reason: HOSPADM

## 2022-04-29 RX ORDER — ROCURONIUM BROMIDE 10 MG/ML
INJECTION, SOLUTION INTRAVENOUS PRN
Status: DISCONTINUED | OUTPATIENT
Start: 2022-04-29 | End: 2022-04-29 | Stop reason: SDUPTHER

## 2022-04-29 RX ORDER — SODIUM CHLORIDE, SODIUM LACTATE, POTASSIUM CHLORIDE, CALCIUM CHLORIDE 600; 310; 30; 20 MG/100ML; MG/100ML; MG/100ML; MG/100ML
INJECTION, SOLUTION INTRAVENOUS CONTINUOUS
Status: DISCONTINUED | OUTPATIENT
Start: 2022-04-29 | End: 2022-04-29 | Stop reason: HOSPADM

## 2022-04-29 RX ORDER — ONDANSETRON 2 MG/ML
INJECTION INTRAMUSCULAR; INTRAVENOUS PRN
Status: DISCONTINUED | OUTPATIENT
Start: 2022-04-29 | End: 2022-04-29 | Stop reason: SDUPTHER

## 2022-04-29 RX ORDER — FENTANYL CITRATE 50 UG/ML
INJECTION, SOLUTION INTRAMUSCULAR; INTRAVENOUS PRN
Status: DISCONTINUED | OUTPATIENT
Start: 2022-04-29 | End: 2022-04-29 | Stop reason: SDUPTHER

## 2022-04-29 RX ORDER — LIDOCAINE HYDROCHLORIDE 10 MG/ML
INJECTION, SOLUTION EPIDURAL; INFILTRATION; INTRACAUDAL; PERINEURAL PRN
Status: DISCONTINUED | OUTPATIENT
Start: 2022-04-29 | End: 2022-04-29 | Stop reason: SDUPTHER

## 2022-04-29 RX ORDER — MIDAZOLAM HYDROCHLORIDE 1 MG/ML
2 INJECTION INTRAMUSCULAR; INTRAVENOUS
Status: DISCONTINUED | OUTPATIENT
Start: 2022-04-29 | End: 2022-04-29 | Stop reason: HOSPADM

## 2022-04-29 RX ORDER — OXYCODONE AND ACETAMINOPHEN 7.5; 325 MG/1; MG/1
1 TABLET ORAL
Status: COMPLETED | OUTPATIENT
Start: 2022-04-29 | End: 2022-04-29

## 2022-04-29 RX ORDER — PROPOFOL 10 MG/ML
INJECTION, EMULSION INTRAVENOUS PRN
Status: DISCONTINUED | OUTPATIENT
Start: 2022-04-29 | End: 2022-04-29 | Stop reason: SDUPTHER

## 2022-04-29 RX ORDER — BUPIVACAINE HYDROCHLORIDE AND EPINEPHRINE 2.5; 5 MG/ML; UG/ML
INJECTION, SOLUTION INFILTRATION; PERINEURAL PRN
Status: DISCONTINUED | OUTPATIENT
Start: 2022-04-29 | End: 2022-04-29 | Stop reason: ALTCHOICE

## 2022-04-29 RX ORDER — OXYCODONE AND ACETAMINOPHEN 7.5; 325 MG/1; MG/1
1 TABLET ORAL EVERY 6 HOURS PRN
Qty: 12 TABLET | Refills: 0 | Status: SHIPPED | OUTPATIENT
Start: 2022-04-29 | End: 2022-05-02

## 2022-04-29 RX ORDER — METOCLOPRAMIDE HYDROCHLORIDE 5 MG/ML
10 INJECTION INTRAMUSCULAR; INTRAVENOUS
Status: DISCONTINUED | OUTPATIENT
Start: 2022-04-29 | End: 2022-04-29 | Stop reason: HOSPADM

## 2022-04-29 RX ORDER — DIPHENHYDRAMINE HYDROCHLORIDE 50 MG/ML
12.5 INJECTION INTRAMUSCULAR; INTRAVENOUS
Status: DISCONTINUED | OUTPATIENT
Start: 2022-04-29 | End: 2022-04-29 | Stop reason: HOSPADM

## 2022-04-29 RX ORDER — SCOLOPAMINE TRANSDERMAL SYSTEM 1 MG/1
1 PATCH, EXTENDED RELEASE TRANSDERMAL
Status: DISCONTINUED | OUTPATIENT
Start: 2022-04-29 | End: 2022-04-29 | Stop reason: HOSPADM

## 2022-04-29 RX ORDER — LIDOCAINE HYDROCHLORIDE 10 MG/ML
1 INJECTION, SOLUTION EPIDURAL; INFILTRATION; INTRACAUDAL; PERINEURAL
Status: DISCONTINUED | OUTPATIENT
Start: 2022-04-29 | End: 2022-04-29 | Stop reason: HOSPADM

## 2022-04-29 RX ORDER — DOCUSATE SODIUM 100 MG/1
100 CAPSULE, LIQUID FILLED ORAL 2 TIMES DAILY
COMMUNITY
End: 2022-08-12 | Stop reason: ALTCHOICE

## 2022-04-29 RX ORDER — FAMOTIDINE 20 MG/1
20 TABLET, FILM COATED ORAL ONCE
Status: COMPLETED | OUTPATIENT
Start: 2022-04-29 | End: 2022-04-29

## 2022-04-29 RX ORDER — INDOCYANINE GREEN AND WATER 25 MG
KIT INJECTION PRN
Status: DISCONTINUED | OUTPATIENT
Start: 2022-04-29 | End: 2022-04-29 | Stop reason: ALTCHOICE

## 2022-04-29 RX ORDER — SODIUM CHLORIDE, SODIUM LACTATE, POTASSIUM CHLORIDE, CALCIUM CHLORIDE 600; 310; 30; 20 MG/100ML; MG/100ML; MG/100ML; MG/100ML
INJECTION, SOLUTION INTRAVENOUS CONTINUOUS
Status: CANCELLED | OUTPATIENT
Start: 2022-04-29

## 2022-04-29 RX ADMIN — FENTANYL CITRATE 50 MCG: 50 INJECTION, SOLUTION INTRAMUSCULAR; INTRAVENOUS at 10:10

## 2022-04-29 RX ADMIN — SODIUM CHLORIDE, SODIUM LACTATE, POTASSIUM CHLORIDE, AND CALCIUM CHLORIDE: 600; 310; 30; 20 INJECTION, SOLUTION INTRAVENOUS at 08:32

## 2022-04-29 RX ADMIN — ROCURONIUM BROMIDE 60 MG: 10 INJECTION, SOLUTION INTRAVENOUS at 09:39

## 2022-04-29 RX ADMIN — FENTANYL CITRATE 50 MCG: 50 INJECTION, SOLUTION INTRAMUSCULAR; INTRAVENOUS at 10:00

## 2022-04-29 RX ADMIN — ONDANSETRON 4 MG: 2 INJECTION INTRAMUSCULAR; INTRAVENOUS at 09:53

## 2022-04-29 RX ADMIN — Medication 50 MG: at 09:49

## 2022-04-29 RX ADMIN — HYDROMORPHONE HYDROCHLORIDE 0.5 MG: 1 INJECTION, SOLUTION INTRAMUSCULAR; INTRAVENOUS; SUBCUTANEOUS at 11:29

## 2022-04-29 RX ADMIN — LIDOCAINE HYDROCHLORIDE 50 MG: 10 INJECTION, SOLUTION EPIDURAL; INFILTRATION; INTRACAUDAL; PERINEURAL at 09:39

## 2022-04-29 RX ADMIN — SUGAMMADEX 300 MG: 100 INJECTION, SOLUTION INTRAVENOUS at 10:46

## 2022-04-29 RX ADMIN — PHENYLEPHRINE HYDROCHLORIDE 100 MCG: 10 INJECTION INTRAVENOUS at 10:38

## 2022-04-29 RX ADMIN — OXYCODONE HYDROCHLORIDE AND ACETAMINOPHEN 1 TABLET: 7.5; 325 TABLET ORAL at 12:11

## 2022-04-29 RX ADMIN — DEXAMETHASONE SODIUM PHOSPHATE 8 MG: 10 INJECTION, SOLUTION INTRAMUSCULAR; INTRAVENOUS at 09:53

## 2022-04-29 RX ADMIN — LABETALOL 20 MG/4 ML (5 MG/ML) INTRAVENOUS SYRINGE 5 MG: at 09:57

## 2022-04-29 RX ADMIN — HYDROMORPHONE HYDROCHLORIDE 0.5 MG: 1 INJECTION, SOLUTION INTRAMUSCULAR; INTRAVENOUS; SUBCUTANEOUS at 11:14

## 2022-04-29 RX ADMIN — FENTANYL CITRATE 150 MCG: 50 INJECTION, SOLUTION INTRAMUSCULAR; INTRAVENOUS at 09:39

## 2022-04-29 RX ADMIN — PROPOFOL 200 MG: 10 INJECTION, EMULSION INTRAVENOUS at 09:39

## 2022-04-29 RX ADMIN — MIDAZOLAM 2 MG: 1 INJECTION INTRAMUSCULAR; INTRAVENOUS at 09:33

## 2022-04-29 RX ADMIN — SODIUM CHLORIDE, SODIUM LACTATE, POTASSIUM CHLORIDE, AND CALCIUM CHLORIDE: 600; 310; 30; 20 INJECTION, SOLUTION INTRAVENOUS at 09:58

## 2022-04-29 RX ADMIN — FENTANYL CITRATE 100 MCG: 50 INJECTION, SOLUTION INTRAMUSCULAR; INTRAVENOUS at 09:54

## 2022-04-29 RX ADMIN — FAMOTIDINE 20 MG: 20 TABLET ORAL at 08:31

## 2022-04-29 ASSESSMENT — PAIN SCALES - GENERAL
PAINLEVEL_OUTOF10: 2
PAINLEVEL_OUTOF10: 9
PAINLEVEL_OUTOF10: 5
PAINLEVEL_OUTOF10: 7

## 2022-04-29 ASSESSMENT — PAIN DESCRIPTION - LOCATION
LOCATION: ABDOMEN

## 2022-04-29 ASSESSMENT — PAIN DESCRIPTION - DESCRIPTORS
DESCRIPTORS: ACHING

## 2022-04-29 ASSESSMENT — PAIN - FUNCTIONAL ASSESSMENT: PAIN_FUNCTIONAL_ASSESSMENT: NONE - DENIES PAIN

## 2022-04-29 ASSESSMENT — LIFESTYLE VARIABLES: SMOKING_STATUS: 0

## 2022-04-29 NOTE — ANESTHESIA POSTPROCEDURE EVALUATION
Department of Anesthesiology  Postprocedure Note    Patient: Rossana Carranza  MRN: [id number]  [place]: 1950  Date of evaluation: 4/29/2022  Time:  11:00 AM     Procedure Summary     Date: 04/29/22 Room / Location: [address]    Anesthesia Start: 7978 Anesthesia Stop: 1100    Procedure: ROBOTIC CHOLECYSTECTOMY (N/A ) Diagnosis:       Symptomatic cholelithiasis      Acute pancreatitis, unspecified complication status, unspecified pancreatitis type      (K80.20, K85.90)    Surgeons: Lex Ramirez DO Responsible Provider: RAY Gaines CRNA    Anesthesia Type: general ASA Status: 2          Anesthesia Type: general    Lorenzo Phase I: Lorenzo Score: 10    Lorenzo Phase II:      Last vitals: Reviewed and per EMR flowsheets.        Anesthesia Post Evaluation    Patient location during evaluation: PACU  Patient participation: complete - patient participated  Level of consciousness: obtunded/minimal responses  Pain score: 0  Airway patency: patent  Nausea & Vomiting: no nausea and no vomiting  Complications: no  Cardiovascular status: hemodynamically stable  Respiratory status: acceptable and nasal cannula  Hydration status: euvolemic

## 2022-04-29 NOTE — ANESTHESIA PRE PROCEDURE
Department of Anesthesiology  Preprocedure Note       Name:  Tangela Miller   Age:  73 y.o.  :  1950                                          MRN:  [id number]         Date:  2022      Surgeon: Real Zarate):  [address] [place] [address]    Procedure: Procedure(s):  ROBOTIC CHOLECYSTECTOMY    Medications prior to admission:   Prior to Admission medications    Medication Sig Start Date End Date Taking? Authorizing Provider   docusate sodium (COLACE) 100 MG capsule Take 100 mg by mouth 2 times daily   Yes Historical Provider, MD   pantoprazole (PROTONIX) 40 MG tablet Take 1 tablet by mouth 2 times daily (before meals) 22   Paul Lugo MD   tamsulosin [facility]) 0.4 MG capsule Take 1 capsule by mouth daily 22   Paul Lugo MD   cetirizine (ZYRTEC) 10 MG tablet Take 10 mg by mouth    Historical Provider, MD       Current medications:    No current facility-administered medications for this encounter. Allergies:     Allergies   Allergen Reactions    Nexium [Esomeprazole Magnesium] Rash    Prilosec [Omeprazole] Rash       Problem List:    Patient Active Problem List   Diagnosis Code    Pneumococcal vaccination declined Z28.21    Pancreatitis, gallstone K85.10    Cholelithiasis K80.20    Acute recurrent pancreatitis K85.90    Urinary retention R33.9       Past Medical History:        Diagnosis Date    Metastatic renal cell carcinoma ([address]     Nasopharyngeal mass     Metastatic renal cell carcinoma, clear cell       Past Surgical History:        Procedure Laterality Date    ADRENALECTOMY Left 10/2017    COLONOSCOPY  2019    Dr Lajune Dubin: Prolapsed Thrombosed Hemorrhoids    NOSE SURGERY  2017    Renal cell carcinoma, metastatic clear cell, 2016, 2017    PARTIAL NEPHRECTOMY Left 10/2017    t3a n0 m0    UPPER GASTROINTESTINAL ENDOSCOPY N/A 2022    Dr María Elena Ramirez (Dr Mauricio Elias pt) w/EUS and fna-Pancreatic fluid collections appearing to cause extrinsic compression on the distal stomach, placement of post pyloric DHT, duodenitis    UPPER GASTROINTESTINAL ENDOSCOPY N/A 03/23/2022    Dr Burgess Santiago (Dr Deep Ballesteros pt) w/EUS and fna-Pancreatic fluid collections appearing to cause extrinsic compression on the distal stomach, placement of post pyloric DHT, duodenitis       Social History:    Social History     Tobacco Use    Smoking status: Never Smoker    Smokeless tobacco: Never Used   Substance Use Topics    Alcohol use: No                                Counseling given: Not Answered      Vital Signs (Current):   Vitals:    04/19/22 1024 04/29/22 0752   BP:  (!) 147/85   Pulse:  104   Resp:  22   Temp:  97.7 °F (36.5 °C)   TempSrc:  Tympanic   SpO2:  97%   Weight: 185 lb (83.9 kg) 185 lb (83.9 kg)   Height: 6' 1\" (1.854 m) 6' 1\" (1.854 m)                                              BP Readings from Last 3 Encounters:   04/29/22 (!) 147/85   04/14/22 126/66   04/01/22 104/74       NPO Status: Time of last liquid consumption: 1830                        Time of last solid consumption: 1830                        Date of last liquid consumption: 04/28/22                        Date of last solid food consumption: 04/28/22    BMI:   Wt Readings from Last 3 Encounters:   04/29/22 185 lb (83.9 kg)   04/22/22 188 lb (85.3 kg)   04/14/22 188 lb 12.8 oz (85.6 kg)     Body mass index is 24.41 kg/m².     CBC:   Lab Results   Component Value Date    WBC 5.3 03/26/2022    RBC 3.39 03/26/2022    HGB 10.3 03/26/2022    HCT 31.7 03/26/2022    MCV 93.5 03/26/2022    RDW 13.3 03/26/2022     03/26/2022       CMP:   Lab Results   Component Value Date     03/26/2022    K 3.8 03/26/2022     03/26/2022    CO2 23 03/26/2022    BUN 8 03/26/2022    CREATININE 0.6 03/26/2022    GFRAA >59 03/26/2022    LABGLOM >60 03/26/2022    GLUCOSE 143 03/26/2022    PROT 6.7 03/31/2022    CALCIUM 8.5 03/26/2022    BILITOT 0.5 03/31/2022    ALKPHOS 105 03/31/2022    AST 24 03/31/2022    ALT 31 03/31/2022 POC Tests: No results for input(s): POCGLU, POCNA, POCK, POCCL, POCBUN, POCHEMO, POCHCT in the last 72 hours. Coags:   Lab Results   Component Value Date    PROTIME 14.9 03/22/2022    INR 1.16 03/22/2022       HCG (If Applicable): No results found for: PREGTESTUR, PREGSERUM, HCG, HCGQUANT     ABGs: No results found for: PHART, PO2ART, ISL4IKD, QWQ0PAH, BEART, E6VAEJPM     Type & Screen (If Applicable):  No results found for: LABABO, LABRH    Drug/Infectious Status (If Applicable):  No results found for: HIV, HEPCAB    COVID-19 Screening (If Applicable):   Lab Results   Component Value Date    COVID19 Not Detected 03/22/2022           Anesthesia Evaluation  Patient summary reviewed and Nursing notes reviewed no history of anesthetic complications:   Airway: Mallampati: I  TM distance: >3 FB   Neck ROM: full  Mouth opening: > = 3 FB Dental:          Pulmonary:normal exam        (-) not a current smoker                           Cardiovascular:Negative CV ROS          ECG reviewed               Beta Blocker:  Not on Beta Blocker         Neuro/Psych:      (-) seizures and CVA           GI/Hepatic/Renal:   (+) GERD: well controlled,          ROS comment: H/o metastatic renal ca. Endo/Other:        (-) diabetes mellitus               Abdominal:             Vascular: negative vascular ROS. Other Findings:             Anesthesia Plan      general     ASA 2       Induction: intravenous. MIPS: Postoperative opioids intended and Prophylactic antiemetics administered. Anesthetic plan and risks discussed with patient.                       Satish Webber MD   4/29/2022

## 2022-04-29 NOTE — INTERVAL H&P NOTE
Update History & Physical    The patient's History and Physical of April 14, 2022 was reviewed with the patient and I examined the patient. There was no change. The surgical site was confirmed by the patient and me. Plan: The risks, benefits, expected outcome, and alternative to the recommended procedure have been discussed with the patient. Patient understands and wants to proceed with the procedure.      Electronically signed by Hui Chin DO on 4/29/2022 at 7:46 AM

## 2022-04-29 NOTE — OP NOTE
Mr. Sydney Hodge was taken to the main operating room and placed on the operating table supine. After the induction of adequate general endotracheal anesthesia the  abdomen was prepped in the usual sterile fashion. Time out performed identifing the correct patient, preoperative antibiotics, and necessary equipment. Local anesthestic was administered to the right upper quadrant. The abdomen was entered using a 5 mm Fios trocar under direct visualization. The laparoscope was advanced and inspection undertaken. There was no evidence of trauma from the trocar insertion. The liver was normal in appearance. The gallbladder was not initially seen. Eight millimeter Working ports were placed in the LUQ, 10 mm lateral to the umbilicus on each side and inferior to the umbilicus. The patient was placed in reverse trendelenberg position and rotated to the left. The [employer] operating system was brought into the field and docked. Working instruments were advanced. Small amount adhesions were taken down on the left upper quadrant. Omental fat was adhered to the edge of the liver this was dissected free using cautery. The Fundus of the gallbladder was grasped and elevated. The neck was grabbed, placed on stretch and the triangle of Calot opened. Firefly vision was used to visualize the cystic duct and biliary anatomy. The neck of the gallbladder was dissected with blunt and electorcautery to visualize the cystic duct. This was cleared of  surrounding tissue. Adjacent to this the cystic artery was identified and also cleared of surrounding tissue. The triangle of Calot was completely dissected and an excellent critical view of safety obtained. The cystic duct was clipped proximally and distally with hemoclips and divided. The cystic artery was divided in a similar fashion. The gallbladder was then  released from the undersurface of the liver using cautery.   Once free, it was placed it in an [employer] retrieval bag and removed through the LLQ incision  without problem. The gallbladder was then sent to pathology. Dome of the liver, right gutter, and subhepatic space were irrigated and the irrigant removed with suction. Gallbladder fossa was without bleeding. The cystic  duct and cystic artery stumps were well seen with clips across each and no evidence of bleeding or bile leak. The working ports were removed under vision with no bleeding noted. The pneumoperitoneum was released and the LLQ port removed. Fascia at the LLQ site was reapproximated with 0 Vicryl suture. Each port site was injected with 10 mL Experel. Skin incisions were closed with interrupted 4-0 Monocryl subcuticular suture followed by Dermabond dressing. Sponge, needle, instrument count correct on 2 occasions. Estimated intraoperative blood loss, minimal.     [facility] tolerated his surgery well and he was taken to PACU in  satisfactory condition.         ________________________________  Sonia Thompson DO

## 2022-05-04 DIAGNOSIS — R73.9 HYPERGLYCEMIA: ICD-10-CM

## 2022-05-04 DIAGNOSIS — Z00.00 ANNUAL PHYSICAL EXAM: ICD-10-CM

## 2022-05-04 LAB
ALBUMIN SERPL-MCNC: 4 G/DL (ref 3.5–5.2)
ALP BLD-CCNC: 129 U/L (ref 40–130)
ALT SERPL-CCNC: 41 U/L (ref 5–41)
ANION GAP SERPL CALCULATED.3IONS-SCNC: 13 MMOL/L (ref 7–19)
AST SERPL-CCNC: 29 U/L (ref 5–40)
BASOPHILS ABSOLUTE: 0 K/UL (ref 0–0.2)
BASOPHILS RELATIVE PERCENT: 0.3 % (ref 0–1)
BILIRUB SERPL-MCNC: 0.5 MG/DL (ref 0.2–1.2)
BUN BLDV-MCNC: 18 MG/DL (ref 8–23)
CALCIUM SERPL-MCNC: 9.3 MG/DL (ref 8.8–10.2)
CHLORIDE BLD-SCNC: 105 MMOL/L (ref 98–111)
CHOLESTEROL, TOTAL: 170 MG/DL (ref 160–199)
CO2: 24 MMOL/L (ref 22–29)
CREAT SERPL-MCNC: 0.8 MG/DL (ref 0.5–1.2)
EOSINOPHILS ABSOLUTE: 0.1 K/UL (ref 0–0.6)
EOSINOPHILS RELATIVE PERCENT: 1.6 % (ref 0–5)
GFR AFRICAN AMERICAN: >59
GFR NON-AFRICAN AMERICAN: >60
GLUCOSE BLD-MCNC: 154 MG/DL (ref 74–109)
HBA1C MFR BLD: 6.7 % (ref 4–6)
HCT VFR BLD CALC: 43.7 % (ref 42–52)
HDLC SERPL-MCNC: 27 MG/DL (ref 55–121)
HEMOGLOBIN: 13.7 G/DL (ref 14–18)
IMMATURE GRANULOCYTES #: 0 K/UL
LDL CHOLESTEROL CALCULATED: 107 MG/DL
LYMPHOCYTES ABSOLUTE: 2.8 K/UL (ref 1.1–4.5)
LYMPHOCYTES RELATIVE PERCENT: 31.7 % (ref 20–40)
MCH RBC QN AUTO: 30.1 PG (ref 27–31)
MCHC RBC AUTO-ENTMCNC: 31.4 G/DL (ref 33–37)
MCV RBC AUTO: 96 FL (ref 80–94)
MONOCYTES ABSOLUTE: 0.6 K/UL (ref 0–0.9)
MONOCYTES RELATIVE PERCENT: 6.7 % (ref 0–10)
NEUTROPHILS ABSOLUTE: 5.2 K/UL (ref 1.5–7.5)
NEUTROPHILS RELATIVE PERCENT: 59.5 % (ref 50–65)
PDW BLD-RTO: 14.6 % (ref 11.5–14.5)
PLATELET # BLD: 238 K/UL (ref 130–400)
PMV BLD AUTO: 10.9 FL (ref 9.4–12.4)
POTASSIUM SERPL-SCNC: 4.4 MMOL/L (ref 3.5–5)
RBC # BLD: 4.55 M/UL (ref 4.7–6.1)
SODIUM BLD-SCNC: 142 MMOL/L (ref 136–145)
TOTAL PROTEIN: 6.7 G/DL (ref 6.6–8.7)
TRIGL SERPL-MCNC: 179 MG/DL (ref 0–149)
WBC # BLD: 8.7 K/UL (ref 4.8–10.8)

## 2022-05-11 NOTE — PROGRESS NOTES
[facility] PHYSICIAN SERVICES  [facility] FAMILY MEDICINE  [address] [facility] [address] [address]  Dept: [phone]  Dept Fax: [phone]  Loc: [phone]    Luci Brooke is a 73 y.o. male who presents today for his medical conditions/complaints as noted below. Luci Brooke is here for Medicare AWV        HPI:   CC: Here today to discuss the following:    Was last here April 1. Was hospitalized from March 22 to March 26 for acute pancreatitis. He had undergone da Atul cholecystectomy on April 29. Doing well today. No nausea or vomiting. No abdominal pain. Appetite back to normal.  Bowels are regular  Continues to take stool softener bid. Taking metamucil. Everything is \"pretty good\". Dorsal gastroesophageal Reflux Disease  Symptoms currently under control. Medication adequately controls symptoms. No hematochezia or melena. Benign Prostatic Hypertrophy  Tolerating medication without adverse effects. Symptoms of hesitancy, nocturia, and dribbling are adequately controlled. No hematuria or dysuria            HPI    Subjective:      Review of Systems  Review of Systems   Constitutional: Negative for chills and fever. HENT: Negative for congestion. Respiratory: Negative for cough, chest tightness and shortness of breath. Cardiovascular: Negative for chest pain, palpitations and leg swelling. Gastrointestinal: Negative for abdominal pain, anal bleeding, constipation, diarrhea and nausea. Genitourinary: Negative for difficulty urinating. Psychiatric/Behavioral: Negative. SeeHPI for visit specific review of symptoms. All others negative      Objective:   /78   Pulse 106   Temp 97.7 °F (36.5 °C)   Ht 6' 1\" (1.854 m)   Wt 186 lb (84.4 kg)   SpO2 98%   BMI 24.54 kg/m²   Physical Exam  Physical Exam   Constitutional: He appears well-developed. Does not appear ill. Neck: Normal range of motion. Neck supple. No masses. Neck Symmetric.   Normal tracheal position. No thyroid enlargement  Cardiovascular: Normal rate and regular rhythm. Exam reveals no friction rub. Carotid arteries: no bruit observed. No murmur heard. Respiratory:  Effort normal and breath sounds normal. No respiratory distress. No wheezes. No rales. No use of accessory muscles or intercostal retractions. Neurological: alert. Psychiatric: normal mood and affect. His behavior is normal. Normal judgement and insight observed.       Recent Results (from the past 672 hour(s))   POCT Urinalysis no Micro    Collection Time: 04/22/22 12:00 AM   Result Value Ref Range    Color, UA yellow     Clarity, UA clear     Glucose, UA POC neg     Bilirubin, UA neg     Ketones, UA neg     Spec Grav, UA 1.020     Blood, UA POC neg     pH, UA 7.0     Protein, UA POC neg     Urobilinogen, UA 0.2     Leukocytes, UA neg     Nitrite, UA neg     Appearance, Fluid Clear Clear, Slightly Cloudy   Hemoglobin A1C    Collection Time: 05/04/22  7:33 AM   Result Value Ref Range    Hemoglobin A1C 6.7 (H) 4.0 - 6.0 %   CBC Auto Differential    Collection Time: 05/04/22  7:33 AM   Result Value Ref Range    WBC 8.7 4.8 - 10.8 K/uL    RBC 4.55 (L) 4.70 - 6.10 M/uL    Hemoglobin 13.7 (L) 14.0 - 18.0 g/dL    Hematocrit 43.7 42.0 - 52.0 %    MCV 96.0 (H) 80.0 - 94.0 fL    MCH 30.1 27.0 - 31.0 pg    MCHC 31.4 (L) 33.0 - 37.0 g/dL    RDW 14.6 (H) 11.5 - 14.5 %    Platelets 135 372 - 439 K/uL    MPV 10.9 9.4 - 12.4 fL    Neutrophils % 59.5 50.0 - 65.0 %    Lymphocytes % 31.7 20.0 - 40.0 %    Monocytes % 6.7 0.0 - 10.0 %    Eosinophils % 1.6 0.0 - 5.0 %    Basophils % 0.3 0.0 - 1.0 %    Neutrophils Absolute 5.2 1.5 - 7.5 K/uL    Immature Granulocytes # 0.0 K/uL    Lymphocytes Absolute 2.8 1.1 - 4.5 K/uL    Monocytes Absolute 0.60 0.00 - 0.90 K/uL    Eosinophils Absolute 0.10 0.00 - 0.60 K/uL    Basophils Absolute 0.00 0.00 - 0.20 K/uL   Lipid Panel    Collection Time: 05/04/22  7:33 AM   Result Value Ref Range    Cholesterol, Total 170 160 - 199 mg/dL    Triglycerides 179 (H) 0 - 149 mg/dL    HDL 27 (L) 55 - 121 mg/dL    LDL Calculated 107 <100 mg/dL   Comprehensive Metabolic Panel    Collection Time: 05/04/22  7:33 AM   Result Value Ref Range    Sodium 142 136 - 145 mmol/L    Potassium 4.4 3.5 - 5.0 mmol/L    Chloride 105 98 - 111 mmol/L    CO2 24 22 - 29 mmol/L    Anion Gap 13 7 - 19 mmol/L    Glucose 154 (H) 74 - 109 mg/dL    BUN 18 8 - 23 mg/dL    CREATININE 0.8 0.5 - 1.2 mg/dL    GFR Non-African American >60 >60    GFR African American >59 >59    Calcium 9.3 8.8 - 10.2 mg/dL    Total Protein 6.7 6.6 - 8.7 g/dL    Albumin 4.0 3.5 - 5.2 g/dL    Total Bilirubin 0.5 0.2 - 1.2 mg/dL    Alkaline Phosphatase 129 40 - 130 U/L    ALT 41 5 - 41 U/L    AST 29 5 - 40 U/L         Lab Results   Component Value Date    LABA1C 6.7 (H) 05/04/2022     Lab Results   Component Value Date    LDLCALC 107 05/04/2022    CREATININE 0.8 05/04/2022           Assessment & Plan: The following diagnoses and conditions are stable with no further orders unless indicated:  1. Annual physical exam  Completed annual wellness  Discussed lifestyle changes such as diet and exercise. Recommended eliminate processed food from diet such as sugar and fried foods. Recommended exercising at least 150 minutes/week. Try to do full body resistance training twice a week as well. Offered suggestions for calorie counting such as phone apps and online resources such as [employer] fitness pal and Lose it. Discussed healthy weight. 2. Pancreatitis, gallstone  Status postcholecystectomy  Doing well    3. Gastroesophageal reflux disease without esophagitis  Protonix    4. Benign prostatic hyperplasia without lower urinary tract symptoms  Flomax    5. Hyperglycemia  Lab Results   Component Value Date    LABA1C 6.7 (H) 05/04/2022     Lab Results   Component Value Date    LDLCALC 107 05/04/2022    CREATININE 0.8 05/04/2022     Discussed his current blood sugars.   He has been borderline diabetic for a while now  States he is motivated to improve this before initiating medication  For less the best option due to his recent bout of pancreatitis. Will recheck in 3 months  - Comprehensive Metabolic Panel; Future  - Hemoglobin A1C; Future  - Microalbumin / Creatinine Urine Ratio; Future    6. Hypercholesterolemia  Lab Results   Component Value Date    CHOL 170 05/04/2022    CHOL 214 (H) 03/12/2021    CHOL 210 (H) 03/05/2020     Lab Results   Component Value Date    TRIG 179 (H) 05/04/2022    TRIG 89 03/23/2022    TRIG 49 03/01/2022     Lab Results   Component Value Date    HDL 27 (L) 05/04/2022    HDL 37 (L) 03/12/2021    HDL 43 (L) 03/05/2020     Lab Results   Component Value Date    LDLCALC 107 05/04/2022    LDLCALC 152 03/12/2021    LDLCALC 145 03/05/2020     No results found for: LABVLDL, VLDL  No results found for: CHOLHDLRATIO  Discussed the cholesterol goals for diabetic  He has had excellent improvement in his cholesterol since March of last year  - Lipid Panel; Future    7. Anemia, unspecified type  Lab Results   Component Value Date    HGB 13.7 (L) 05/04/2022     Will recheck next visit. Likely related to recent illness  - CBC with Auto Differential; Future    8. Encounter for prostate cancer screening    - PSA Screening; Future            Return in about 3 months (around 8/12/2022) for Routine follow up - 15 minute visit. Discussed use, benefit, and side effects of prescribed medications. All patient questions answered. Pt voiced understanding. Reviewed health maintenance. Instructedto continue current medications, diet and exercise. Patient agreed with treatmentplan.  Follow up as directed.     _______________________________________________________________      Past Medical History:   Diagnosis Date    Cholelithiasis 3/1/2022    Metastatic renal cell carcinoma (HCC)     Nasopharyngeal mass     Metastatic renal cell carcinoma, clear cell      Past Surgical History:   Procedure Laterality Date    ADRENALECTOMY Left 10/2017    CHOLECYSTECTOMY, LAPAROSCOPIC N/A 4/29/2022    ROBOTIC CHOLECYSTECTOMY performed by Soni Mercado DO at [address] [facility] COLONOSCOPY  12/09/2019    Dr Goldie Calixto: Prolapsed Thrombosed Hemorrhoids    NOSE SURGERY  12/2017    Renal cell carcinoma, metastatic clear cell, April 2016, December 2017    PARTIAL NEPHRECTOMY Left 10/2017    t3a n0 m0    UPPER GASTROINTESTINAL ENDOSCOPY N/A 03/23/2022    Dr Norman Maria (Dr Shruthi Helton pt) w/EUS and fna-Pancreatic fluid collections appearing to cause extrinsic compression on the distal stomach, placement of post pyloric DHT, duodenitis    UPPER GASTROINTESTINAL ENDOSCOPY N/A 03/23/2022    Dr Norman Maria (Dr Shruthi Helton pt) w/EUS and fna-Pancreatic fluid collections appearing to cause extrinsic compression on the distal stomach, placement of post pyloric DHT, duodenitis       Family History   Problem Relation Age of Onset    Breast Cancer Mother     Breast Cancer Sister     Cancer Brother     Colon Cancer Neg Hx     Colon Polyps Neg Hx        Social History     Tobacco Use    Smoking status: Never Smoker    Smokeless tobacco: Never Used   Substance Use Topics    Alcohol use: No     Current Outpatient Medications   Medication Sig Dispense Refill    docusate sodium (COLACE) 100 MG capsule Take 100 mg by mouth 2 times daily      pantoprazole (PROTONIX) 40 MG tablet Take 1 tablet by mouth 2 times daily (before meals) 60 tablet 2    tamsulosin (FLOMAX) 0.4 MG capsule Take 1 capsule by mouth daily 30 capsule 5    cetirizine (ZYRTEC) 10 MG tablet Take 10 mg by mouth       No current facility-administered medications for this visit.      Allergies   Allergen Reactions    Nexium [Esomeprazole Magnesium] Rash    Prilosec [Omeprazole] Rash       Health Maintenance   Topic Date Due    COVID-19 Vaccine (3 - Booster for Pfizer series) 09/18/2021    DTaP/Tdap/Td vaccine (1 - Tdap) 05/12/2023 (Originally 5/25/1969)    A1C test (Diabetic or Prediabetic)  08/04/2022    Depression Screen  05/12/2023    Lipids  05/04/2027    Colorectal Cancer Screen  12/12/2029    Flu vaccine  Completed    Shingles vaccine  Completed    Pneumococcal 65+ years Vaccine  Completed    Hepatitis C screen  Completed    Hepatitis A vaccine  Aged Out    Hepatitis B vaccine  Aged Out    Hib vaccine  Aged Out    Meningococcal (ACWY) vaccine  Aged Out       _______________________________________________________________    Note dictated using [id number] [facility]  Sometimes this dictation software makes erroneous transcriptions.

## 2022-05-11 NOTE — PROGRESS NOTES
Medicare Annual Wellness Visit - Subsequent    Name: Jos Comer Date: 2022   MRN: [id number] Sex: Male   Age: 73 y.o. Ethnicity: Non- / Non    : 1950 Race: White (non-)      Deborah Delgado is here for   Chief Complaint   Patient presents with   [facility]        Screenings for behavioral, psychosocial and functional/safety risks, and cognitive dysfunction are all negative except as indicated below. These results, as well as other patient data from the [address] [facility] [address] form, are documented in Flowsheets linked to this Encounter. Allergies   Allergen Reactions    Nexium [Esomeprazole Magnesium] Rash    Prilosec [Omeprazole] Rash       Prior to Visit Medications    Medication Sig Taking?  Authorizing Provider   docusate sodium (COLACE) 100 MG capsule Take 100 mg by mouth 2 times daily Yes Historical Provider, MD   pantoprazole (PROTONIX) 40 MG tablet Take 1 tablet by mouth 2 times daily (before meals) Yes Pamela Ramos MD   tamsulosin (FLOMAX) 0.4 MG capsule Take 1 capsule by mouth daily Yes Pamela Ramos MD   cetirizine (ZYRTEC) 10 MG tablet Take 10 mg by mouth Yes Historical Provider, MD         Past Medical History:   Diagnosis Date    Cholelithiasis 3/1/2022    Metastatic renal cell carcinoma ([facility] [address]     Nasopharyngeal mass     Metastatic renal cell carcinoma, clear cell         Past Surgical History:   Procedure Laterality Date    ADRENALECTOMY Left 10/2017    CHOLECYSTECTOMY, LAPAROSCOPIC N/A 2022    ROBOTIC CHOLECYSTECTOMY performed by Elizabeth Paez DO at [address] [place] [address]  2019    Dr Elizabeth Quintero: Prolapsed Thrombosed Hemorrhoids    NOSE SURGERY  2017    Renal cell carcinoma, metastatic clear cell, 2016, 2017    PARTIAL NEPHRECTOMY Left 10/2017    t3a n0 m0    UPPER GASTROINTESTINAL ENDOSCOPY N/A 2022    Dr Mylene Mendiola (Dr Ian Lilly pt) w/EUS and fna-Pancreatic fluid collections appearing to cause extrinsic compression on the distal stomach, placement of post pyloric DHT, duodenitis    UPPER GASTROINTESTINAL ENDOSCOPY N/A 03/23/2022    Dr Harsh Lott (Dr Graham Aceves pt) w/EUS and fna-Pancreatic fluid collections appearing to cause extrinsic compression on the distal stomach, placement of post pyloric DHT, duodenitis       Family History   Problem Relation Age of Onset    Breast Cancer Mother     Breast Cancer Sister     Cancer Brother     Colon Cancer Neg Hx     Colon Polyps Neg Hx        CareTeam (Including outside providers/suppliers regularly involved in providing care):   Patient Care Team:  Willy Schroeder MD as PCP - General (Family Medicine)  Willy Schroeder MD as PCP - [facility] Empaneled Provider    Wt Readings from Last 3 Encounters:   05/12/22 186 lb (84.4 kg)   04/29/22 185 lb (83.9 kg)   04/22/22 188 lb (85.3 kg)     Vitals:    05/12/22 0751   BP: 122/78   Pulse: 106   Temp: 97.7 °F (36.5 °C)   SpO2: 98%   Weight: 186 lb (84.4 kg)   Height: 6' 1\" (1.854 m)           The following problems were reviewed today and where indicated follow up appointments were made and/or referrals ordered.     Risk Factor Screenings with Interventions     Fall Risk:  2 or more falls in past year?: no  Fall with injury in past year?: no  Fall Risk Interventions:    · Not indicated     Depression:  PHQ-2 Score: 0  Depression Interventions:  · Not indicated     Anxiety:     Anxiety Interventions:  · Not indicated     Cognitive:  Clock Drawing Test (CDT): Normal  Cognitive Impairment Interventions:  · Not indicated     Substance Abuse:  Social History     Socioeconomic History    Marital status:      Spouse name: Not on file    Number of children: Not on file    Years of education: Not on file    Highest education level: Not on file   Occupational History    Not on file   Tobacco Use    Smoking status: Never Smoker    Smokeless tobacco: Never Used   Vaping Use    Vaping Use: Never used   Substance and Sexual Activity    Alcohol use: No    Drug use: No    Sexual activity: Not on file   Other Topics Concern    Not on file   Social History Narrative    Not on file     Social Determinants of Health     Financial Resource Strain:     Difficulty of Paying Living Expenses: Not on file   Food Insecurity:     Worried About Running Out of Food in the Last Year: Not on file    Fabio of Food in the Last Year: Not on file   Transportation Needs:     Lack of Transportation (Medical): Not on file    Lack of Transportation (Non-Medical):  Not on file   Physical Activity: Insufficiently Active    Days of Exercise per Week: 3 days    Minutes of Exercise per Session: 30 min   Stress:     Feeling of Stress : Not on file   Social Connections:     Frequency of Communication with Friends and Family: Not on file    Frequency of Social Gatherings with Friends and Family: Not on file    Attends Jew Services: Not on file    Active Member of Clubs or Organizations: Not on file    Attends Club or Organization Meetings: Not on file    Marital Status: Not on file   Intimate Partner Violence:     Fear of Current or Ex-Partner: Not on file    Emotionally Abused: Not on file    Physically Abused: Not on file    Sexually Abused: Not on file   Housing Stability:     Unable to Pay for Housing in the Last Year: Not on file    Number of [place] in the Last Year: Not on file    Unstable Housing in the Last Year: Not on file        Substance Abuse Interventions:  · Not indicated     Health Risk Assessment:     General  In general, how would you say your health is?: Good  In the past 7 days, have you experienced any of the following: New or Increased Pain, New or Increased Fatigue, Loneliness, Social Isolation, Stress or Anger?: No  Do you get the social and emotional support that you need?: Yes  General Health Risk Interventions:  · Not indicated     Health Habits/Nutrition  On average, how many days per week do you engage in moderate subunit, adjuvanted, IM (Fluad 65 yrs and older) 09/12/2019    Pneumococcal Conjugate 13-valent ([id number]) 03/14/2019    Pneumococcal Polysaccharide ([id number]) 03/16/2020    Tetanus 01/01/2015    Zoster Recombinant (Shingrix) 03/22/2019, 07/10/2019        Health Maintenance   Topic Date Due    Annual Wellness Visit (AWV)  03/17/2021    COVID-19 Vaccine (3 - Booster for Pfizer series) 09/18/2021    DTaP/Tdap/Td vaccine (1 - Tdap) 05/12/2023 (Originally 5/25/1969)    A1C test (Diabetic or Prediabetic)  08/04/2022    Depression Screen  05/12/2023    Lipids  05/04/2027    Colorectal Cancer Screen  12/12/2029    Flu vaccine  Completed    Shingles vaccine  Completed    Pneumococcal 65+ years Vaccine  Completed    Hepatitis C screen  Completed    Hepatitis A vaccine  Aged Out    Hepatitis B vaccine  Aged Out    Hib vaccine  Aged Out    Meningococcal (ACWY) vaccine  Aged Out       Recommendations for [place] Due: see orders.   Recommended screening schedule for the next 5-10 years is provided to the patient in written form: see Patient Instructions/AVS.

## 2022-05-12 ENCOUNTER — OFFICE VISIT (OUTPATIENT)
Dept: SURGERY | Age: 72
End: 2022-05-12

## 2022-05-12 ENCOUNTER — OFFICE VISIT (OUTPATIENT)
Dept: FAMILY MEDICINE CLINIC | Age: 72
End: 2022-05-12
Payer: MEDICARE

## 2022-05-12 VITALS
WEIGHT: 188.2 LBS | HEIGHT: 73 IN | BODY MASS INDEX: 24.94 KG/M2 | TEMPERATURE: 98 F | DIASTOLIC BLOOD PRESSURE: 70 MMHG | SYSTOLIC BLOOD PRESSURE: 120 MMHG

## 2022-05-12 VITALS
TEMPERATURE: 97.7 F | HEIGHT: 73 IN | BODY MASS INDEX: 24.65 KG/M2 | WEIGHT: 186 LBS | HEART RATE: 106 BPM | SYSTOLIC BLOOD PRESSURE: 122 MMHG | DIASTOLIC BLOOD PRESSURE: 78 MMHG | OXYGEN SATURATION: 98 %

## 2022-05-12 DIAGNOSIS — E78.00 HYPERCHOLESTEROLEMIA: ICD-10-CM

## 2022-05-12 DIAGNOSIS — K85.10 PANCREATITIS, GALLSTONE: Primary | ICD-10-CM

## 2022-05-12 DIAGNOSIS — N40.0 BENIGN PROSTATIC HYPERPLASIA WITHOUT LOWER URINARY TRACT SYMPTOMS: ICD-10-CM

## 2022-05-12 DIAGNOSIS — Z00.00 ANNUAL PHYSICAL EXAM: Primary | ICD-10-CM

## 2022-05-12 DIAGNOSIS — D64.9 ANEMIA, UNSPECIFIED TYPE: ICD-10-CM

## 2022-05-12 DIAGNOSIS — K21.9 GASTROESOPHAGEAL REFLUX DISEASE WITHOUT ESOPHAGITIS: ICD-10-CM

## 2022-05-12 DIAGNOSIS — K85.10 PANCREATITIS, GALLSTONE: ICD-10-CM

## 2022-05-12 DIAGNOSIS — R73.9 HYPERGLYCEMIA: ICD-10-CM

## 2022-05-12 DIAGNOSIS — Z12.5 ENCOUNTER FOR PROSTATE CANCER SCREENING: ICD-10-CM

## 2022-05-12 PROCEDURE — G0439 PPPS, SUBSEQ VISIT: HCPCS | Performed by: FAMILY MEDICINE

## 2022-05-12 PROCEDURE — 1036F TOBACCO NON-USER: CPT | Performed by: FAMILY MEDICINE

## 2022-05-12 PROCEDURE — 4040F PNEUMOC VAC/ADMIN/RCVD: CPT | Performed by: FAMILY MEDICINE

## 2022-05-12 PROCEDURE — 99024 POSTOP FOLLOW-UP VISIT: CPT | Performed by: SURGERY

## 2022-05-12 PROCEDURE — 3017F COLORECTAL CA SCREEN DOC REV: CPT | Performed by: FAMILY MEDICINE

## 2022-05-12 PROCEDURE — 1123F ACP DISCUSS/DSCN MKR DOCD: CPT | Performed by: FAMILY MEDICINE

## 2022-05-12 PROCEDURE — G8420 CALC BMI NORM PARAMETERS: HCPCS | Performed by: FAMILY MEDICINE

## 2022-05-12 PROCEDURE — 99214 OFFICE O/P EST MOD 30 MIN: CPT | Performed by: FAMILY MEDICINE

## 2022-05-12 PROCEDURE — G8427 DOCREV CUR MEDS BY ELIG CLIN: HCPCS | Performed by: FAMILY MEDICINE

## 2022-05-12 ASSESSMENT — PATIENT HEALTH QUESTIONNAIRE - PHQ9
1. LITTLE INTEREST OR PLEASURE IN DOING THINGS: 0
2. FEELING DOWN, DEPRESSED OR HOPELESS: 0
SUM OF ALL RESPONSES TO PHQ9 QUESTIONS 1 & 2: 0
SUM OF ALL RESPONSES TO PHQ QUESTIONS 1-9: 0

## 2022-05-12 ASSESSMENT — LIFESTYLE VARIABLES
HOW MANY STANDARD DRINKS CONTAINING ALCOHOL DO YOU HAVE ON A TYPICAL DAY: 1 OR 2
HOW OFTEN DO YOU HAVE A DRINK CONTAINING ALCOHOL: MONTHLY OR LESS

## 2022-05-12 NOTE — PROGRESS NOTES
S: Mr. Mani Pedersen returns today for a post op visit 2 weeks post laparoscopic/Robotic cholecystectomy. Since hospital discharge he has done well. His post op pain has resolved and he has not had any further episodes of biliary colic. No fever or chills reported. No problem with his incisions and he has returned to his usual activities. His appetite is back to normal and he has had return of normal bowel and bladder function. O: Abdomen is soft and non tender. Laparoscopy incisions are nicely healed. No mass appreciated, bowel sounds are normal.     Pathology report:   Chronic Cholecystitis     A: 1) Satisfactory recovery post laparoscopic/Robotic cholecystectomy. P: 1) Continue with usual diet and activity       2) Follow up prn.

## 2022-05-23 RX ORDER — PANTOPRAZOLE SODIUM 40 MG/1
40 TABLET, DELAYED RELEASE ORAL
Qty: 180 TABLET | Refills: 1 | Status: SHIPPED | OUTPATIENT
Start: 2022-05-23

## 2022-07-22 RX ORDER — PANTOPRAZOLE SODIUM 40 MG/1
40 TABLET, DELAYED RELEASE ORAL
Qty: 180 TABLET | Refills: 1 | OUTPATIENT
Start: 2022-07-22

## 2022-08-09 DIAGNOSIS — Z12.5 ENCOUNTER FOR PROSTATE CANCER SCREENING: ICD-10-CM

## 2022-08-09 DIAGNOSIS — E78.00 HYPERCHOLESTEROLEMIA: ICD-10-CM

## 2022-08-09 DIAGNOSIS — D64.9 ANEMIA, UNSPECIFIED TYPE: ICD-10-CM

## 2022-08-09 DIAGNOSIS — R73.9 HYPERGLYCEMIA: ICD-10-CM

## 2022-08-09 LAB
ALBUMIN SERPL-MCNC: 4.2 G/DL (ref 3.5–5.2)
ALP BLD-CCNC: 88 U/L (ref 40–130)
ALT SERPL-CCNC: 11 U/L (ref 5–41)
ANION GAP SERPL CALCULATED.3IONS-SCNC: 11 MMOL/L (ref 7–19)
AST SERPL-CCNC: 14 U/L (ref 5–40)
BASOPHILS ABSOLUTE: 0 K/UL (ref 0–0.2)
BASOPHILS RELATIVE PERCENT: 0.4 % (ref 0–1)
BILIRUB SERPL-MCNC: 0.5 MG/DL (ref 0.2–1.2)
BUN BLDV-MCNC: 28 MG/DL (ref 8–23)
CALCIUM SERPL-MCNC: 9.2 MG/DL (ref 8.8–10.2)
CHLORIDE BLD-SCNC: 104 MMOL/L (ref 98–111)
CHOLESTEROL, TOTAL: 193 MG/DL (ref 160–199)
CO2: 27 MMOL/L (ref 22–29)
CREAT SERPL-MCNC: 1 MG/DL (ref 0.5–1.2)
CREATININE URINE: 84.5 MG/DL (ref 4.2–622)
EOSINOPHILS ABSOLUTE: 0.2 K/UL (ref 0–0.6)
EOSINOPHILS RELATIVE PERCENT: 2.6 % (ref 0–5)
GFR AFRICAN AMERICAN: >59
GFR NON-AFRICAN AMERICAN: >60
GLUCOSE BLD-MCNC: 129 MG/DL (ref 74–109)
HBA1C MFR BLD: 6.7 % (ref 4–6)
HCT VFR BLD CALC: 42.4 % (ref 42–52)
HDLC SERPL-MCNC: 42 MG/DL (ref 55–121)
HEMOGLOBIN: 13.6 G/DL (ref 14–18)
IMMATURE GRANULOCYTES #: 0 K/UL
LDL CHOLESTEROL CALCULATED: 130 MG/DL
LYMPHOCYTES ABSOLUTE: 2.7 K/UL (ref 1.1–4.5)
LYMPHOCYTES RELATIVE PERCENT: 35.9 % (ref 20–40)
MCH RBC QN AUTO: 31.1 PG (ref 27–31)
MCHC RBC AUTO-ENTMCNC: 32.1 G/DL (ref 33–37)
MCV RBC AUTO: 96.8 FL (ref 80–94)
MICROALBUMIN UR-MCNC: <1.2 MG/DL (ref 0–19)
MICROALBUMIN/CREAT UR-RTO: NORMAL MG/G
MONOCYTES ABSOLUTE: 0.5 K/UL (ref 0–0.9)
MONOCYTES RELATIVE PERCENT: 6.8 % (ref 0–10)
NEUTROPHILS ABSOLUTE: 4.1 K/UL (ref 1.5–7.5)
NEUTROPHILS RELATIVE PERCENT: 54.2 % (ref 50–65)
PDW BLD-RTO: 13.6 % (ref 11.5–14.5)
PLATELET # BLD: 167 K/UL (ref 130–400)
PMV BLD AUTO: 11 FL (ref 9.4–12.4)
POTASSIUM SERPL-SCNC: 4.6 MMOL/L (ref 3.5–5)
PROSTATE SPECIFIC ANTIGEN: 1.76 NG/ML (ref 0–4)
RBC # BLD: 4.38 M/UL (ref 4.7–6.1)
SODIUM BLD-SCNC: 142 MMOL/L (ref 136–145)
TOTAL PROTEIN: 6.6 G/DL (ref 6.6–8.7)
TRIGL SERPL-MCNC: 107 MG/DL (ref 0–149)
WBC # BLD: 7.6 K/UL (ref 4.8–10.8)

## 2022-08-11 NOTE — PROGRESS NOTES
[facility] PHYSICIAN SERVICES  [address] [facility] MEDICINE  [address] [facility] [address] [address]  Dept: [phone]  Dept Fax: [phone]  Loc: [phone]    Albert York is a 81 y.o. male who presents today for his medical conditions/complaints as noted below. Albert York is here for 3 Month Follow-Up        HPI:   CC: Here today to discuss the following:    3-month follow-up. He was hospitalized in March for acute pancreatitis. He had undergone da Atul cholecystectomy in April. Overall has been doing well since then. Continues to take Protonix for reflux issues. Benign Prostatic Hypertrophy  Tolerating medication without adverse effects. Symptoms of hesitancy, nocturia, and dribbling are adequately controlled. No hematuria or dysuria            HPI    Subjective:      Review of Systems    Review of Systems   Constitutional: Negative for chills and fever. HENT: Negative for congestion. Respiratory: Negative for cough, chest tightness and shortness of breath. Cardiovascular: Negative for chest pain, palpitations and leg swelling. Gastrointestinal: Negative for abdominal pain, anal bleeding, constipation, diarrhea and nausea. Genitourinary: Negative for difficulty urinating. Psychiatric/Behavioral: Negative. SeeHPI for visit specific review of symptoms. All others negative      Objective:   /66   Pulse 89   Temp 98.1 °F (36.7 °C)   Wt 184 lb (83.5 kg)   SpO2 98%   BMI 24.28 kg/m²   Physical Exam  Physical Exam   Constitutional: He appears well-developed. Does not appear ill. Neck: Neck supple. No masses. Neck Symmetric. Normal tracheal position. No thyroid enlargement  Cardiovascular: Normal rate and regular rhythm. Exam reveals no friction rub. No murmur heard. Respiratory:  Effort normal and breath sounds normal. No respiratory distress. No wheezes. No rales. No use of accessory muscles or intercostal retractions. Neurological: alert. Psychiatric: normal mood and affect.  His behavior is normal.     Lab Results   Component Value Date    PSA 1.76 08/09/2022    PSA 1.61 03/12/2021    PSA 1.65 03/05/2020         Recent Results (from the past 672 hour(s))   PSA Screening    Collection Time: 08/09/22  7:57 AM   Result Value Ref Range    PSA 1.76 0.00 - 4.00 ng/mL   CBC with Auto Differential    Collection Time: 08/09/22  7:57 AM   Result Value Ref Range    WBC 7.6 4.8 - 10.8 K/uL    RBC 4.38 (L) 4.70 - 6.10 M/uL    Hemoglobin 13.6 (L) 14.0 - 18.0 g/dL    Hematocrit 42.4 42.0 - 52.0 %    MCV 96.8 (H) 80.0 - 94.0 fL    MCH 31.1 (H) 27.0 - 31.0 pg    MCHC 32.1 (L) 33.0 - 37.0 g/dL    RDW 13.6 11.5 - 14.5 %    Platelets 910 466 - 418 K/uL    MPV 11.0 9.4 - 12.4 fL    Neutrophils % 54.2 50.0 - 65.0 %    Lymphocytes % 35.9 20.0 - 40.0 %    Monocytes % 6.8 0.0 - 10.0 %    Eosinophils % 2.6 0.0 - 5.0 %    Basophils % 0.4 0.0 - 1.0 %    Neutrophils Absolute 4.1 1.5 - 7.5 K/uL    Immature Granulocytes # 0.0 K/uL    Lymphocytes Absolute 2.7 1.1 - 4.5 K/uL    Monocytes Absolute 0.50 0.00 - 0.90 K/uL    Eosinophils Absolute 0.20 0.00 - 0.60 K/uL    Basophils Absolute 0.00 0.00 - 0.20 K/uL   Microalbumin / Creatinine Urine Ratio    Collection Time: 08/09/22  7:57 AM   Result Value Ref Range    Microalbumin, Random Urine <1.20 0.00 - 19.00 mg/dL    Creatinine, Ur 84.5 4.2 - 622.0 mg/dL    Microalbumin Creatinine Ratio see below mg/g   Lipid Panel    Collection Time: 08/09/22  7:57 AM   Result Value Ref Range    Cholesterol, Total 193 160 - 199 mg/dL    Triglycerides 107 0 - 149 mg/dL    HDL 42 (L) 55 - 121 mg/dL    LDL Calculated 130 <100 mg/dL   Hemoglobin A1C    Collection Time: 08/09/22  7:57 AM   Result Value Ref Range    Hemoglobin A1C 6.7 (H) 4.0 - 6.0 %   Comprehensive Metabolic Panel    Collection Time: 08/09/22  7:57 AM   Result Value Ref Range    Sodium 142 136 - 145 mmol/L    Potassium 4.6 3.5 - 5.0 mmol/L    Chloride 104 98 - 111 mmol/L    CO2 27 22 - 29 mmol/L    Anion Gap 11 7 - 19 mmol/L    Glucose 129 (H) 74 - 109 mg/dL    BUN 28 (H) 8 - 23 mg/dL    Creatinine 1.0 0.5 - 1.2 mg/dL    GFR Non-African American >60 >60    GFR African American >59 >59    Calcium 9.2 8.8 - 10.2 mg/dL    Total Protein 6.6 6.6 - 8.7 g/dL    Albumin 4.2 3.5 - 5.2 g/dL    Total Bilirubin 0.5 0.2 - 1.2 mg/dL    Alkaline Phosphatase 88 40 - 130 U/L    ALT 11 5 - 41 U/L    AST 14 5 - 40 U/L       Lab Results   Component Value Date    LABA1C 6.7 (H) 08/09/2022    LABA1C 6.7 (H) 05/04/2022     Lab Results   Component Value Date    LABMICR <1.20 08/09/2022    LDLCALC 130 08/09/2022    CREATININE 1.0 08/09/2022       Lab Results   Component Value Date    CHOL 193 08/09/2022    CHOL 170 05/04/2022    CHOL 214 (H) 03/12/2021     Lab Results   Component Value Date    TRIG 107 08/09/2022    TRIG 179 (H) 05/04/2022    TRIG 89 03/23/2022     Lab Results   Component Value Date    HDL 42 (L) 08/09/2022    HDL 27 (L) 05/04/2022    HDL 37 (L) 03/12/2021     Lab Results   Component Value Date    LDLCALC 130 08/09/2022    LDLCALC 107 05/04/2022    LDLCALC 152 03/12/2021     No results found for: LABVLDL, VLDL  No results found for: CHOLHDLRATIO        Assessment & Plan: The following diagnoses and conditions are stable with no further orders unless indicated:  1. Nausea  Nausea is significantly better but he would like to have some Phenergan on hand  - promethazine (PHENERGAN) 25 MG tablet; Take 1 tablet by mouth 4 times daily as needed for Nausea  Dispense: 12 tablet; Refill: 0    2. Hyperglycemia  Lab Results   Component Value Date    LABA1C 6.7 (H) 08/09/2022    LABA1C 6.7 (H) 05/04/2022     Lab Results   Component Value Date    LABMICR <1.20 08/09/2022    LDLCALC 130 08/09/2022    CREATININE 1.0 08/09/2022     Encourage lifestyle modification. We discussed the possibility of starting medication.   Considering his recent cholecystectomy and common side effects of metformin include gastrointestinal issues, courage lifestyle modification before starting medication  - Comprehensive Metabolic Panel; Future  - Hemoglobin A1C; Future    3. Lipid screening    - Lipid Panel; Future    4. Anemia, unspecified type  Lab Results   Component Value Date    HGB 13.6 (L) 08/09/2022     Hemoglobin increased from 10.3 in March to 13.6. We will continue to monitor  - CBC with Auto Differential; Future  - Ferritin; Future  - Folate; Future  - Iron; Future    5. Gastroesophageal reflux disease without esophagitis  Stable          Return in about 3 months (around 11/12/2022) for Routine follow up - 20 minutes. Discussed use, benefit, and side effects of prescribed medications. All patient questions answered. Pt voiced understanding. Reviewed health maintenance. Instructedto continue current medications, diet and exercise. Patient agreed with treatmentplan.  Follow up as directed.     _______________________________________________________________      Past Medical History:   Diagnosis Date    Cholelithiasis 3/1/2022    Metastatic renal cell carcinoma ([facility] [address]     Nasopharyngeal mass     Metastatic renal cell carcinoma, clear cell      Past Surgical History:   Procedure Laterality Date    ADRENALECTOMY Left 10/2017    CHOLECYSTECTOMY, LAPAROSCOPIC N/A 4/29/2022    ROBOTIC CHOLECYSTECTOMY performed by Debi Reese DO at [address]  12/09/2019    Dr Lamar Ovalles: Prolapsed Thrombosed Hemorrhoids    NOSE SURGERY  12/2017    Renal cell carcinoma, metastatic clear cell, April 2016, December 2017    PARTIAL NEPHRECTOMY Left 10/2017    t3a n0 m0    UPPER GASTROINTESTINAL ENDOSCOPY N/A 03/23/2022    Dr Judson Altamirano (Dr Arnel Noriega pt) w/EUS and fna-Pancreatic fluid collections appearing to cause extrinsic compression on the distal stomach, placement of post pyloric DHT, duodenitis    UPPER GASTROINTESTINAL ENDOSCOPY N/A 03/23/2022    Dr Judson Altamirano (Dr Arnel Noriega pt) w/EUS and fna-Pancreatic fluid collections appearing to cause extrinsic compression on the distal stomach, placement of post pyloric DHT, duodenitis       Family History   Problem Relation Age of Onset    Breast Cancer Mother     Breast Cancer Sister     Cancer Brother     Colon Cancer Neg Hx     Colon Polyps Neg Hx        Social History     Tobacco Use    Smoking status: Never    Smokeless tobacco: Never   Substance Use Topics    Alcohol use: No     Current Outpatient Medications   Medication Sig Dispense Refill    diclofenac sodium (VOLTAREN) 1 % GEL Apply 4 g topically in the morning and 4 g at noon and 4 g in the evening and 4 g before bedtime. 200 g 0    pantoprazole (PROTONIX) 40 MG tablet Take 1 tablet by mouth 2 times daily (before meals) 180 tablet 1    tamsulosin (FLOMAX) 0.4 MG capsule Take 1 capsule by mouth daily 30 capsule 5    cetirizine (ZYRTEC) 10 MG tablet Take 10 mg by mouth       No current facility-administered medications for this visit.      Allergies   Allergen Reactions    Nexium [Esomeprazole Magnesium] Rash    Prilosec [Omeprazole] Rash       Health Maintenance   Topic Date Due    Diabetic foot exam  Never done    Diabetic retinal exam  Never done    COVID-19 Vaccine (3 - Booster for Pfizer series) 09/18/2021    DTaP/Tdap/Td vaccine (1 - Tdap) 05/12/2023 (Originally 5/25/1969)    Flu vaccine (1) 09/01/2022    Depression Screen  05/12/2023    Annual Wellness Visit (AWV)  05/13/2023    A1C test (Diabetic or Prediabetic)  08/09/2023    Diabetic microalbuminuria test  08/09/2023    Lipids  08/09/2023    Colorectal Cancer Screen  12/12/2029    Shingles vaccine  Completed    Pneumococcal 65+ years Vaccine  Completed    Hepatitis C screen  Completed    Hepatitis A vaccine  Aged Out    Hepatitis B vaccine  Aged Out    Hib vaccine  Aged Out    Meningococcal (ACWY) vaccine  Aged Out       _______________________________________________________________    Note dictated using [id number] [facility]  Sometimes this dictation software makes erroneous transcriptions.

## 2022-08-12 ENCOUNTER — OFFICE VISIT (OUTPATIENT)
Dept: FAMILY MEDICINE CLINIC | Age: 72
End: 2022-08-12
Payer: MEDICARE

## 2022-08-12 VITALS
WEIGHT: 184 LBS | HEART RATE: 89 BPM | TEMPERATURE: 98.1 F | DIASTOLIC BLOOD PRESSURE: 66 MMHG | BODY MASS INDEX: 24.28 KG/M2 | OXYGEN SATURATION: 98 % | SYSTOLIC BLOOD PRESSURE: 106 MMHG

## 2022-08-12 DIAGNOSIS — D64.9 ANEMIA, UNSPECIFIED TYPE: ICD-10-CM

## 2022-08-12 DIAGNOSIS — R11.0 NAUSEA: ICD-10-CM

## 2022-08-12 DIAGNOSIS — Z13.220 LIPID SCREENING: ICD-10-CM

## 2022-08-12 DIAGNOSIS — R73.9 HYPERGLYCEMIA: Primary | ICD-10-CM

## 2022-08-12 DIAGNOSIS — K21.9 GASTROESOPHAGEAL REFLUX DISEASE WITHOUT ESOPHAGITIS: ICD-10-CM

## 2022-08-12 PROCEDURE — 3017F COLORECTAL CA SCREEN DOC REV: CPT | Performed by: FAMILY MEDICINE

## 2022-08-12 PROCEDURE — G8420 CALC BMI NORM PARAMETERS: HCPCS | Performed by: FAMILY MEDICINE

## 2022-08-12 PROCEDURE — G8427 DOCREV CUR MEDS BY ELIG CLIN: HCPCS | Performed by: FAMILY MEDICINE

## 2022-08-12 PROCEDURE — 1124F ACP DISCUSS-NO DSCNMKR DOCD: CPT | Performed by: FAMILY MEDICINE

## 2022-08-12 PROCEDURE — 1036F TOBACCO NON-USER: CPT | Performed by: FAMILY MEDICINE

## 2022-08-12 PROCEDURE — 99213 OFFICE O/P EST LOW 20 MIN: CPT | Performed by: FAMILY MEDICINE

## 2022-08-12 RX ORDER — PROMETHAZINE HYDROCHLORIDE 25 MG/1
25 TABLET ORAL 4 TIMES DAILY PRN
Qty: 12 TABLET | Refills: 0 | Status: SHIPPED | OUTPATIENT
Start: 2022-08-12 | End: 2022-08-19

## 2022-10-05 ENCOUNTER — NURSE ONLY (OUTPATIENT)
Dept: FAMILY MEDICINE CLINIC | Age: 72
End: 2022-10-05
Payer: MEDICARE

## 2022-10-05 DIAGNOSIS — Z23 FLU VACCINE NEED: Primary | ICD-10-CM

## 2022-10-05 PROCEDURE — 90694 VACC AIIV4 NO PRSRV 0.5ML IM: CPT | Performed by: FAMILY MEDICINE

## 2022-10-05 PROCEDURE — G0008 ADMIN INFLUENZA VIRUS VAC: HCPCS | Performed by: FAMILY MEDICINE

## 2022-11-10 DIAGNOSIS — R73.9 HYPERGLYCEMIA: ICD-10-CM

## 2022-11-10 DIAGNOSIS — Z13.220 LIPID SCREENING: ICD-10-CM

## 2022-11-10 DIAGNOSIS — D64.9 ANEMIA, UNSPECIFIED TYPE: ICD-10-CM

## 2022-11-10 LAB
ALBUMIN SERPL-MCNC: 4.3 G/DL (ref 3.5–5.2)
ALP BLD-CCNC: 78 U/L (ref 40–130)
ALT SERPL-CCNC: 19 U/L (ref 5–41)
ANION GAP SERPL CALCULATED.3IONS-SCNC: 9 MMOL/L (ref 7–19)
AST SERPL-CCNC: 17 U/L (ref 5–40)
BASOPHILS ABSOLUTE: 0 K/UL (ref 0–0.2)
BASOPHILS RELATIVE PERCENT: 0.3 % (ref 0–1)
BILIRUB SERPL-MCNC: 0.7 MG/DL (ref 0.2–1.2)
BUN BLDV-MCNC: 30 MG/DL (ref 8–23)
CALCIUM SERPL-MCNC: 9.1 MG/DL (ref 8.8–10.2)
CHLORIDE BLD-SCNC: 105 MMOL/L (ref 98–111)
CHOLESTEROL, TOTAL: 223 MG/DL (ref 160–199)
CO2: 27 MMOL/L (ref 22–29)
CREAT SERPL-MCNC: 0.8 MG/DL (ref 0.5–1.2)
EOSINOPHILS ABSOLUTE: 0.1 K/UL (ref 0–0.6)
EOSINOPHILS RELATIVE PERCENT: 1.8 % (ref 0–5)
FERRITIN: 125.5 NG/ML (ref 30–400)
FOLATE: >20 NG/ML (ref 4.5–32.2)
GFR SERPL CREATININE-BSD FRML MDRD: >60 ML/MIN/{1.73_M2}
GLUCOSE BLD-MCNC: 123 MG/DL (ref 74–109)
HBA1C MFR BLD: 6.2 % (ref 4–6)
HCT VFR BLD CALC: 42.8 % (ref 42–52)
HDLC SERPL-MCNC: 49 MG/DL (ref 55–121)
HEMOGLOBIN: 14.2 G/DL (ref 14–18)
IMMATURE GRANULOCYTES #: 0 K/UL
IRON: 92 UG/DL (ref 59–158)
LDL CHOLESTEROL CALCULATED: 157 MG/DL
LYMPHOCYTES ABSOLUTE: 2.3 K/UL (ref 1.1–4.5)
LYMPHOCYTES RELATIVE PERCENT: 38 % (ref 20–40)
MCH RBC QN AUTO: 31.6 PG (ref 27–31)
MCHC RBC AUTO-ENTMCNC: 33.2 G/DL (ref 33–37)
MCV RBC AUTO: 95.3 FL (ref 80–94)
MONOCYTES ABSOLUTE: 0.4 K/UL (ref 0–0.9)
MONOCYTES RELATIVE PERCENT: 7 % (ref 0–10)
NEUTROPHILS ABSOLUTE: 3.2 K/UL (ref 1.5–7.5)
NEUTROPHILS RELATIVE PERCENT: 52.7 % (ref 50–65)
PDW BLD-RTO: 13.9 % (ref 11.5–14.5)
PLATELET # BLD: 172 K/UL (ref 130–400)
PMV BLD AUTO: 11.2 FL (ref 9.4–12.4)
POTASSIUM SERPL-SCNC: 4.5 MMOL/L (ref 3.5–5)
RBC # BLD: 4.49 M/UL (ref 4.7–6.1)
SODIUM BLD-SCNC: 141 MMOL/L (ref 136–145)
TOTAL PROTEIN: 6.3 G/DL (ref 6.6–8.7)
TRIGL SERPL-MCNC: 86 MG/DL (ref 0–149)
WBC # BLD: 6 K/UL (ref 4.8–10.8)

## 2022-11-14 ENCOUNTER — OFFICE VISIT (OUTPATIENT)
Dept: FAMILY MEDICINE CLINIC | Age: 72
End: 2022-11-14
Payer: MEDICARE

## 2022-11-14 VITALS
TEMPERATURE: 97.9 F | OXYGEN SATURATION: 98 % | SYSTOLIC BLOOD PRESSURE: 128 MMHG | HEIGHT: 73 IN | BODY MASS INDEX: 25.78 KG/M2 | WEIGHT: 194.5 LBS | DIASTOLIC BLOOD PRESSURE: 86 MMHG | HEART RATE: 85 BPM

## 2022-11-14 DIAGNOSIS — K21.9 GASTROESOPHAGEAL REFLUX DISEASE WITHOUT ESOPHAGITIS: Primary | ICD-10-CM

## 2022-11-14 DIAGNOSIS — Z13.220 LIPID SCREENING: ICD-10-CM

## 2022-11-14 DIAGNOSIS — N40.0 BENIGN PROSTATIC HYPERPLASIA WITHOUT LOWER URINARY TRACT SYMPTOMS: ICD-10-CM

## 2022-11-14 DIAGNOSIS — E78.00 HYPERCHOLESTEROLEMIA: ICD-10-CM

## 2022-11-14 DIAGNOSIS — D64.9 ANEMIA, UNSPECIFIED TYPE: ICD-10-CM

## 2022-11-14 DIAGNOSIS — R73.9 HYPERGLYCEMIA: ICD-10-CM

## 2022-11-14 PROCEDURE — 1036F TOBACCO NON-USER: CPT | Performed by: FAMILY MEDICINE

## 2022-11-14 PROCEDURE — G8417 CALC BMI ABV UP PARAM F/U: HCPCS | Performed by: FAMILY MEDICINE

## 2022-11-14 PROCEDURE — 99213 OFFICE O/P EST LOW 20 MIN: CPT | Performed by: FAMILY MEDICINE

## 2022-11-14 PROCEDURE — 3017F COLORECTAL CA SCREEN DOC REV: CPT | Performed by: FAMILY MEDICINE

## 2022-11-14 PROCEDURE — 1124F ACP DISCUSS-NO DSCNMKR DOCD: CPT | Performed by: FAMILY MEDICINE

## 2022-11-14 PROCEDURE — G8427 DOCREV CUR MEDS BY ELIG CLIN: HCPCS | Performed by: FAMILY MEDICINE

## 2022-11-14 PROCEDURE — G8484 FLU IMMUNIZE NO ADMIN: HCPCS | Performed by: FAMILY MEDICINE

## 2022-11-14 SDOH — ECONOMIC STABILITY: FOOD INSECURITY: WITHIN THE PAST 12 MONTHS, YOU WORRIED THAT YOUR FOOD WOULD RUN OUT BEFORE YOU GOT MONEY TO BUY MORE.: NEVER TRUE

## 2022-11-14 SDOH — ECONOMIC STABILITY: FOOD INSECURITY: WITHIN THE PAST 12 MONTHS, THE FOOD YOU BOUGHT JUST DIDN'T LAST AND YOU DIDN'T HAVE MONEY TO GET MORE.: NEVER TRUE

## 2022-11-14 ASSESSMENT — SOCIAL DETERMINANTS OF HEALTH (SDOH): HOW HARD IS IT FOR YOU TO PAY FOR THE VERY BASICS LIKE FOOD, HOUSING, MEDICAL CARE, AND HEATING?: NOT HARD AT ALL

## 2022-11-14 NOTE — PROGRESS NOTES
Seek  [facility] PHYSICIAN SERVICES  [facility]  [address] [address] [address]  Dept: [phone]  Dept Fax: [phone]  Loc: [phone]    Naida Yap is a 81 y.o. male who presents today for his medical conditions/complaints as noted below. Naida Yap is here for Follow-up        HPI:   CC: Here today to discuss the following:    3-month follow-up  Gastroesophageal Reflux Disease  Symptoms currently under control. Medication adequately controls symptoms. No hematochezia or melena. Benign Prostatic Hypertrophy  Tolerating medication without adverse effects. Symptoms of hesitancy, nocturia, and dribbling are adequately controlled. No hematuria or dysuria                HPI    Subjective:      Review of Systems  Review of Systems   Constitutional: Negative for chills and fever. HENT: Negative for congestion. Respiratory: Negative for cough, chest tightness and shortness of breath. Cardiovascular: Negative for chest pain, palpitations and leg swelling. Gastrointestinal: Negative for abdominal pain, anal bleeding, constipation, diarrhea and nausea. Genitourinary: Negative for difficulty urinating. Psychiatric/Behavioral: Negative. SeeHPI for visit specific review of symptoms. All others negative      Objective:   /86   Pulse 85   Temp 97.9 °F (36.6 °C)   Ht 6' 1\" (1.854 m)   Wt 194 lb 8 oz (88.2 kg)   SpO2 98%   BMI 25.66 kg/m²   Physical Exam  Physical Exam   Constitutional: He appears well-developed. Does not appear ill. Neck: Neck supple. No masses. Neck Symmetric. Normal tracheal position. No thyroid enlargement  Cardiovascular: Normal rate and regular rhythm. Exam reveals no friction rub. No murmur heard. Respiratory:  Effort normal and breath sounds normal. No respiratory distress. No wheezes. No rales. No use of accessory muscles or intercostal retractions. Neurological: alert.    Psychiatric: normal mood and affect.  His behavior is normal.       Recent Results (from the past 672 hour(s))   Hemoglobin A1C    Collection Time: 11/10/22  8:05 AM   Result Value Ref Range    Hemoglobin A1C 6.2 (H) 4.0 - 6.0 %   Iron    Collection Time: 11/10/22  8:05 AM   Result Value Ref Range    Iron 92 59 - 158 ug/dL   Folate    Collection Time: 11/10/22  8:05 AM   Result Value Ref Range    Folate >20.0 4.5 - 32.2 ng/mL   Ferritin    Collection Time: 11/10/22  8:05 AM   Result Value Ref Range    Ferritin 125.5 30.0 - 400.0 ng/mL   CBC with Auto Differential    Collection Time: 11/10/22  8:05 AM   Result Value Ref Range    WBC 6.0 4.8 - 10.8 K/uL    RBC 4.49 (L) 4.70 - 6.10 M/uL    Hemoglobin 14.2 14.0 - 18.0 g/dL    Hematocrit 42.8 42.0 - 52.0 %    MCV 95.3 (H) 80.0 - 94.0 fL    MCH 31.6 (H) 27.0 - 31.0 pg    MCHC 33.2 33.0 - 37.0 g/dL    RDW 13.9 11.5 - 14.5 %    Platelets 396 295 - 758 K/uL    MPV 11.2 9.4 - 12.4 fL    Neutrophils % 52.7 50.0 - 65.0 %    Lymphocytes % 38.0 20.0 - 40.0 %    Monocytes % 7.0 0.0 - 10.0 %    Eosinophils % 1.8 0.0 - 5.0 %    Basophils % 0.3 0.0 - 1.0 %    Neutrophils Absolute 3.2 1.5 - 7.5 K/uL    Immature Granulocytes # 0.0 K/uL    Lymphocytes Absolute 2.3 1.1 - 4.5 K/uL    Monocytes Absolute 0.40 0.00 - 0.90 K/uL    Eosinophils Absolute 0.10 0.00 - 0.60 K/uL    Basophils Absolute 0.00 0.00 - 0.20 K/uL   Comprehensive Metabolic Panel    Collection Time: 11/10/22  8:05 AM   Result Value Ref Range    Sodium 141 136 - 145 mmol/L    Potassium 4.5 3.5 - 5.0 mmol/L    Chloride 105 98 - 111 mmol/L    CO2 27 22 - 29 mmol/L    Anion Gap 9 7 - 19 mmol/L    Glucose 123 (H) 74 - 109 mg/dL    BUN 30 (H) 8 - 23 mg/dL    Creatinine 0.8 0.5 - 1.2 mg/dL    Est, Glom Filt Rate >60 >60    Calcium 9.1 8.8 - 10.2 mg/dL    Total Protein 6.3 (L) 6.6 - 8.7 g/dL    Albumin 4.3 3.5 - 5.2 g/dL    Total Bilirubin 0.7 0.2 - 1.2 mg/dL    Alkaline Phosphatase 78 40 - 130 U/L    ALT 19 5 - 41 U/L    AST 17 5 - 40 U/L   Lipid Panel Collection Time: 11/10/22  8:05 AM   Result Value Ref Range    Cholesterol, Total 223 (H) 160 - 199 mg/dL    Triglycerides 86 0 - 149 mg/dL    HDL 49 (L) 55 - 121 mg/dL    LDL Calculated 157 <100 mg/dL   POCT Urinalysis no Micro    Collection Time: 11/18/22 12:00 AM   Result Value Ref Range    Color, UA yellow     Clarity, UA clear     Glucose, UA POC neg     Bilirubin, UA neg     Ketones, UA neg     Spec Grav, UA 1.020     Blood, UA POC neg     pH, UA 6.5     Protein, UA POC neg     Urobilinogen, UA 0.2     Leukocytes, UA neg     Nitrite, UA neg     Appearance, Fluid Clear Clear, Slightly Cloudy       Lab Results   Component Value Date    PSA 1.76 08/09/2022    PSA 1.61 03/12/2021    PSA 1.65 03/05/2020             Assessment & Plan: The following diagnoses and conditions are stable with no further orders unless indicated:  1. Hyperglycemia  Lab Results   Component Value Date    LABA1C 6.2 (H) 11/10/2022    LABA1C 6.7 (H) 08/09/2022    LABA1C 6.7 (H) 05/04/2022     Lab Results   Component Value Date    LABMICR <1.20 08/09/2022    LDLCALC 157 11/10/2022    CREATININE 0.8 11/10/2022     Significant improvement in his A1c since last visit. Will continue with lifestyle modification  Encouraged ADA diet  Will recheck in 6 months    2. Gastroesophageal reflux disease without esophagitis  Continue with Protonix 40 mg twice daily    3.  Benign prostatic hyperplasia without lower urinary tract symptoms  Flomax      Orders Placed This Encounter   Procedures    Comprehensive Metabolic Panel     Standing Status:   Future     Standing Expiration Date:   11/14/2023    Hemoglobin A1C     Standing Status:   Future     Standing Expiration Date:   11/14/2023    Hemoglobin A1C     Standing Status:   Future     Standing Expiration Date:   11/14/2023    Comprehensive Metabolic Panel     Standing Status:   Future     Standing Expiration Date:   11/14/2023    Lipid Panel     Standing Status:   Future     Standing Expiration Date: 11/14/2023     Order Specific Question:   Is Patient Fasting?/# of Hours     Answer:   12    CBC with Auto Differential     Standing Status:   Future     Standing Expiration Date:   11/14/2023           Return in about 6 months (around 5/14/2023) for AWV - 20 minute visit. Discussed use, benefit, and side effects of prescribed medications. All patient questions answered. Pt voiced understanding. Reviewed health maintenance. Instructedto continue current medications, diet and exercise. Patient agreed with treatmentplan.  Follow up as directed.     _______________________________________________________________      Past Medical History:   Diagnosis Date    Cholelithiasis 3/1/2022    Metastatic renal cell carcinoma (HCC)     Nasopharyngeal mass     Metastatic renal cell carcinoma, clear cell      Past Surgical History:   Procedure Laterality Date    ADRENALECTOMY Left 10/2017    CHOLECYSTECTOMY, LAPAROSCOPIC N/A 4/29/2022    ROBOTIC CHOLECYSTECTOMY performed by Sanam Mead DO at [address]  12/09/2019    Dr Lynnette Cogan: Prolapsed Thrombosed Hemorrhoids    NOSE SURGERY  12/2017    Renal cell carcinoma, metastatic clear cell, April 2016, December 2017    PARTIAL NEPHRECTOMY Left 10/2017    t3a n0 m0    UPPER GASTROINTESTINAL ENDOSCOPY N/A 03/23/2022    Dr Mack Charlton (Dr Jean-Pierre Rajan pt) w/EUS and fna-Pancreatic fluid collections appearing to cause extrinsic compression on the distal stomach, placement of post pyloric DHT, duodenitis    UPPER GASTROINTESTINAL ENDOSCOPY N/A 03/23/2022    Dr Mack Charlton (Dr Jean-Pierre Rajan pt) w/EUS and fna-Pancreatic fluid collections appearing to cause extrinsic compression on the distal stomach, placement of post pyloric DHT, duodenitis       Family History   Problem Relation Age of Onset    Breast Cancer Mother     Breast Cancer Sister     Cancer Brother     Colon Cancer Neg Hx     Colon Polyps Neg Hx        Social History     Tobacco Use    Smoking status: Never    Smokeless

## 2022-11-14 NOTE — PATIENT INSTRUCTIONS
The following may help for colds and allergies:   Antihistamines: Help nasal drainage, watery eyes, sneezing, sore throat.    Benadryl (diphenhydramine) 25mg every 6 hours as needed   Zyrtec (cetirizine) 10mg once a day  Allegra (fexofenadine) 180mg once a day  Claritin or Alavert (loratadine) 10mg once a day    Expectorants: Loosen mucous and help nasal and chest congestion  Mucinex 600mg twice a day    Nasal Steroid: Help nasal drainage, nasal congestion, sneezing, and sinus pressure  Nasacort 1 spray each nostril once a day  Flonase 1 spray each nostril once a day or twice a day  Rhinocort 1 spray each nostril once a day    Astepro: 2 sprays each nostril twice a day

## 2022-11-16 RX ORDER — FAMOTIDINE 20 MG/1
TABLET, FILM COATED ORAL
Qty: 90 TABLET | Refills: 0 | OUTPATIENT
Start: 2022-11-16

## 2022-11-18 ENCOUNTER — OFFICE VISIT (OUTPATIENT)
Dept: UROLOGY | Age: 72
End: 2022-11-18
Payer: MEDICARE

## 2022-11-18 VITALS — BODY MASS INDEX: 25.87 KG/M2 | WEIGHT: 195.2 LBS | TEMPERATURE: 98.5 F | HEIGHT: 73 IN

## 2022-11-18 DIAGNOSIS — N40.1 BPH WITH OBSTRUCTION/LOWER URINARY TRACT SYMPTOMS: Primary | ICD-10-CM

## 2022-11-18 DIAGNOSIS — N13.8 BPH WITH OBSTRUCTION/LOWER URINARY TRACT SYMPTOMS: Primary | ICD-10-CM

## 2022-11-18 DIAGNOSIS — Z85.528 PERSONAL HISTORY OF RENAL CELL CANCER: ICD-10-CM

## 2022-11-18 DIAGNOSIS — R33.9 URINARY RETENTION: ICD-10-CM

## 2022-11-18 LAB
APPEARANCE FLUID: CLEAR
BILIRUBIN, POC: NORMAL
BLOOD URINE, POC: NORMAL
CLARITY, POC: CLEAR
COLOR, POC: YELLOW
GLUCOSE URINE, POC: NORMAL
KETONES, POC: NORMAL
LEUKOCYTE EST, POC: NORMAL
NITRITE, POC: NORMAL
PH, POC: 6.5
PROTEIN, POC: NORMAL
SPECIFIC GRAVITY, POC: 1.02
UROBILINOGEN, POC: 0.2

## 2022-11-18 PROCEDURE — G8484 FLU IMMUNIZE NO ADMIN: HCPCS | Performed by: NURSE PRACTITIONER

## 2022-11-18 PROCEDURE — 51798 US URINE CAPACITY MEASURE: CPT | Performed by: NURSE PRACTITIONER

## 2022-11-18 PROCEDURE — 1124F ACP DISCUSS-NO DSCNMKR DOCD: CPT | Performed by: NURSE PRACTITIONER

## 2022-11-18 PROCEDURE — G8427 DOCREV CUR MEDS BY ELIG CLIN: HCPCS | Performed by: NURSE PRACTITIONER

## 2022-11-18 PROCEDURE — G8417 CALC BMI ABV UP PARAM F/U: HCPCS | Performed by: NURSE PRACTITIONER

## 2022-11-18 PROCEDURE — 81002 URINALYSIS NONAUTO W/O SCOPE: CPT | Performed by: NURSE PRACTITIONER

## 2022-11-18 PROCEDURE — 99214 OFFICE O/P EST MOD 30 MIN: CPT | Performed by: NURSE PRACTITIONER

## 2022-11-18 PROCEDURE — 1036F TOBACCO NON-USER: CPT | Performed by: NURSE PRACTITIONER

## 2022-11-18 PROCEDURE — 3017F COLORECTAL CA SCREEN DOC REV: CPT | Performed by: NURSE PRACTITIONER

## 2022-11-18 NOTE — PROGRESS NOTES
Maricruz Giron is a 81 y.o., male, Established patient who presents today   Chief Complaint   Patient presents with    Follow-up     I am here today for my 6 mo BPH fu         HPI   Patient presents for follow-up of lower urinary tract symptoms. He was initially seen in the office after an episode of urinary retention in February after discharge from the hospital for an episode of pancreatitis. When he presented to the emergency room at that time about 1 L was drained from his bladder. He is currently maintained on Flomax 0.4 mg. He really does not tolerate alpha-blocker therapy well and experiences severe dizziness with the medication. We did attempt to increase his medication to 0.8 mg, but this was intolerable. Patient really has only mild complaints of lower urinary tract symptoms including frequency, weak stream, nocturia x1. He has an AUA symptom score of 3/35 with a bother score of 3. Patient also has elevated postvoid residual.    Patient has also history of left nephrectomy secondary to renal cell carcinoma in 2016. He is currently followed by MD Galina Waterman for this condition. His brother has a history of prostate cancer was diagnosed in his late 46s. Patient's PSAs have been stable over the last several years. Lab Results   Component Value Date    PSA 1.76 08/09/2022    PSA 1.61 03/12/2021    PSA 1.65 03/05/2020     REVIEW OF SYSTEMS:  Review of Systems   Constitutional:  Negative for chills and fever. Gastrointestinal:  Negative for abdominal distention, abdominal pain, nausea and vomiting. Genitourinary:  Positive for frequency. Negative for difficulty urinating, dysuria, flank pain, hematuria and urgency. Musculoskeletal:  Negative for back pain and gait problem. Psychiatric/Behavioral:  Negative for agitation and confusion.       PHYSICAL EXAM:  Temp 98.5 °F (36.9 °C) (Temporal)   Ht 6' 1\" (1.854 m)   Wt 195 lb 3.2 oz (88.5 kg)   BMI 25.75 kg/m²   Physical Exam  Vitals and nursing note reviewed. Constitutional:       General: He is not in acute distress. Appearance: Normal appearance. He is not ill-appearing. Pulmonary:      Effort: Pulmonary effort is normal. No respiratory distress. Abdominal:      General: There is no distension. Tenderness: There is no abdominal tenderness. There is no right CVA tenderness or left CVA tenderness. Neurological:      Mental Status: He is alert and oriented to person, place, and time. Mental status is at baseline. Psychiatric:         Mood and Affect: Mood normal.         Behavior: Behavior normal.       DATA:  Results for orders placed or performed in visit on 11/18/22   POCT Urinalysis no Micro   Result Value Ref Range    Color, UA yellow     Clarity, UA clear     Glucose, UA POC neg     Bilirubin, UA neg     Ketones, UA neg     Spec Grav, UA 1.020     Blood, UA POC neg     pH, UA 6.5     Protein, UA POC neg     Urobilinogen, UA 0.2     Leukocytes, UA neg     Nitrite, UA neg     Appearance, Fluid Clear Clear, Slightly Cloudy     ASSESSMENT/PLAN  1. BPH with obstruction/lower urinary tract symptoms  Patient with continued lower urinary tract symptoms and elevated postvoid residual.  Patient unable to tolerate alpha blockers. We have discussed pursuing surgical options and the patient is very interested in pursuing UroLift. I did explain that we did not offer this at our facility, but I could provide him with a referral to a local surgeon who does perform the procedure. - KS Measure, post-void residual, US, non-imaging  - POCT Urinalysis no Micro  - External Referral To Urology    2. Personal history of renal cell cancer  Followed by [facility]. 3. Urinary retention  Continued elevated postvoid residual.  Does not tolerate alpha blockers well. Request referral for evaluation for UroLift.     Orders Placed This Encounter   Procedures    External Referral To Urology     Referral Priority:   Routine     Referral Type:   [facility]     Referral Reason:   Specialty Services Required     Requested Specialty:   Urology     Number of Visits Requested:   1    POCT Urinalysis no Micro    DC Measure, post-void residual, US, non-imaging          No follow-ups on file. An electronic signature was used to authenticate this note. RAY DE LA TORRE - CNP    All information inputted into the note by the MA to include chief complaint, past medical history, past surgical history, medications, allergies, social and family history and review of systems has been reviewed and updated as needed by me. EMR Dragon/transcription disclaimer: Much of this document is electronic transcription/translation of spoken language to printed text. The electronic translation of spoken language may be erroneous or, at times, nonsensical words or phrases may be inadvertently transcribed.  Although I have reviewed the document for such errors, some may still exist.

## 2022-11-19 ASSESSMENT — ENCOUNTER SYMPTOMS
NAUSEA: 0
ABDOMINAL PAIN: 0
VOMITING: 0
ABDOMINAL DISTENTION: 0
BACK PAIN: 0

## (undated) DEVICE — SUTURE SZ 0 27IN 5/8 CIR UR-6  TAPER PT VIOLET ABSRB VICRYL J603H

## (undated) DEVICE — FORCEPS BX L240CM JAW DIA2.4MM ORNG L CAP W/ NDL DISP RAD

## (undated) DEVICE — CANNULA SEAL

## (undated) DEVICE — GLOVE SURG SZ 7 CRM LTX FREE POLYISOPRENE POLYMER BEAD ANTI

## (undated) DEVICE — BLADE LARYNSCP HNDL MAC 3 DISP CURAVIEW LED

## (undated) DEVICE — CLIP LIG L235CM RESOL 360 BX/20

## (undated) DEVICE — TUBE ET 7MM NSL ORAL BASIC CUF INTMED MURPHY EYE RADPQ MRK

## (undated) DEVICE — SUTURE MCRYL SZ 4-0 L18IN ABSRB UD L19MM PS-2 3/8 CIR PRIM Y496G

## (undated) DEVICE — CLIP INT L POLYMER LOK LIG HEM O LOK

## (undated) DEVICE — TROCAR LAPSCP 5 MM DIA 100 MM LEN N THRD OPT VW STPCOCK UNIV

## (undated) DEVICE — TROCAR: Brand: KII FIOS FIRST ENTRY

## (undated) DEVICE — BLADELESS OBTURATOR: Brand: WECK VISTA

## (undated) DEVICE — ARM DRAPE

## (undated) DEVICE — GLOVE SURG SZ 75 L12IN FNGR THK79MIL GRN LTX FREE

## (undated) DEVICE — TISSUE RETRIEVAL SYSTEM: Brand: INZII RETRIEVAL SYSTEM

## (undated) DEVICE — GENERAL LAP CDS

## (undated) DEVICE — ENDOSCOPIC ULTRASOUND ASPIRATION NEEDLE: Brand: EXPECT SLIMLINE SL

## (undated) DEVICE — COVER LT HNDL BLU PLAS

## (undated) DEVICE — SOLUTION ANTIFOG VIS SYS CLEARIFY LAPSCP

## (undated) DEVICE — Z DUPLICATE USE 2738952 SYSTEM VENT M AD NSL PAP DEV HD STRP 2L HYPRINFL BG MRI

## (undated) DEVICE — COVER,MAYO STAND,STERILE: Brand: MEDLINE

## (undated) DEVICE — ENDO KIT,LOURDES HOSPITAL: Brand: MEDLINE INDUSTRIES, INC.

## (undated) DEVICE — GOWN,PREVENTION PLUS,XL,ST,24/CS: Brand: MEDLINE

## (undated) DEVICE — CUFF BLD PRESSURE 1 TUBE AD 25-34 CM ARM VLY FLEXIPORT DISP

## (undated) DEVICE — ADHESIVE SKIN CLSR 0.7ML TOP DERMBND ADV